# Patient Record
Sex: FEMALE | Race: WHITE | NOT HISPANIC OR LATINO | URBAN - METROPOLITAN AREA
[De-identification: names, ages, dates, MRNs, and addresses within clinical notes are randomized per-mention and may not be internally consistent; named-entity substitution may affect disease eponyms.]

---

## 2018-01-01 ENCOUNTER — INPATIENT (INPATIENT)
Facility: HOSPITAL | Age: 83
LOS: 15 days | DRG: 193 | End: 2018-03-11
Attending: INTERNAL MEDICINE | Admitting: INTERNAL MEDICINE
Payer: MEDICARE

## 2018-01-01 VITALS
RESPIRATION RATE: 24 BRPM | OXYGEN SATURATION: 98 % | DIASTOLIC BLOOD PRESSURE: 74 MMHG | HEART RATE: 142 BPM | SYSTOLIC BLOOD PRESSURE: 137 MMHG

## 2018-01-01 VITALS
SYSTOLIC BLOOD PRESSURE: 150 MMHG | OXYGEN SATURATION: 93 % | TEMPERATURE: 99 F | DIASTOLIC BLOOD PRESSURE: 80 MMHG | RESPIRATION RATE: 24 BRPM | HEART RATE: 123 BPM

## 2018-01-01 DIAGNOSIS — J96.21 ACUTE AND CHRONIC RESPIRATORY FAILURE WITH HYPOXIA: ICD-10-CM

## 2018-01-01 DIAGNOSIS — E44.0 MODERATE PROTEIN-CALORIE MALNUTRITION: ICD-10-CM

## 2018-01-01 DIAGNOSIS — R53.81 OTHER MALAISE: ICD-10-CM

## 2018-01-01 DIAGNOSIS — Z78.9 OTHER SPECIFIED HEALTH STATUS: ICD-10-CM

## 2018-01-01 DIAGNOSIS — J44.9 CHRONIC OBSTRUCTIVE PULMONARY DISEASE, UNSPECIFIED: ICD-10-CM

## 2018-01-01 DIAGNOSIS — G93.40 ENCEPHALOPATHY, UNSPECIFIED: ICD-10-CM

## 2018-01-01 DIAGNOSIS — R06.89 OTHER ABNORMALITIES OF BREATHING: ICD-10-CM

## 2018-01-01 DIAGNOSIS — Z71.89 OTHER SPECIFIED COUNSELING: ICD-10-CM

## 2018-01-01 DIAGNOSIS — J18.1 LOBAR PNEUMONIA, UNSPECIFIED ORGANISM: ICD-10-CM

## 2018-01-01 DIAGNOSIS — J10.1 INFLUENZA DUE TO OTHER IDENTIFIED INFLUENZA VIRUS WITH OTHER RESPIRATORY MANIFESTATIONS: ICD-10-CM

## 2018-01-01 DIAGNOSIS — H25.093 OTHER AGE-RELATED INCIPIENT CATARACT, BILATERAL: Chronic | ICD-10-CM

## 2018-01-01 DIAGNOSIS — Z98.890 OTHER SPECIFIED POSTPROCEDURAL STATES: Chronic | ICD-10-CM

## 2018-01-01 DIAGNOSIS — Z51.5 ENCOUNTER FOR PALLIATIVE CARE: ICD-10-CM

## 2018-01-01 DIAGNOSIS — E86.0 DEHYDRATION: ICD-10-CM

## 2018-01-01 DIAGNOSIS — I48.92 UNSPECIFIED ATRIAL FLUTTER: ICD-10-CM

## 2018-01-01 LAB
ALBUMIN SERPL ELPH-MCNC: 3.5 G/DL — SIGNIFICANT CHANGE UP (ref 3.3–5)
ALBUMIN SERPL ELPH-MCNC: 3.6 G/DL — SIGNIFICANT CHANGE UP (ref 3.3–5)
ALP SERPL-CCNC: 108 U/L — SIGNIFICANT CHANGE UP (ref 40–120)
ALP SERPL-CCNC: 66 U/L — SIGNIFICANT CHANGE UP (ref 40–120)
ALT FLD-CCNC: 34 U/L RC — SIGNIFICANT CHANGE UP (ref 10–45)
ALT FLD-CCNC: 63 U/L RC — HIGH (ref 10–45)
ANION GAP SERPL CALC-SCNC: 10 MMOL/L — SIGNIFICANT CHANGE UP (ref 5–17)
ANION GAP SERPL CALC-SCNC: 10 MMOL/L — SIGNIFICANT CHANGE UP (ref 5–17)
ANION GAP SERPL CALC-SCNC: 11 MMOL/L — SIGNIFICANT CHANGE UP (ref 5–17)
ANION GAP SERPL CALC-SCNC: 11 MMOL/L — SIGNIFICANT CHANGE UP (ref 5–17)
ANION GAP SERPL CALC-SCNC: 16 MMOL/L — SIGNIFICANT CHANGE UP (ref 5–17)
ANION GAP SERPL CALC-SCNC: 18 MMOL/L — HIGH (ref 5–17)
ANION GAP SERPL CALC-SCNC: 6 MMOL/L — SIGNIFICANT CHANGE UP (ref 5–17)
ANION GAP SERPL CALC-SCNC: 7 MMOL/L — SIGNIFICANT CHANGE UP (ref 5–17)
ANION GAP SERPL CALC-SCNC: 8 MMOL/L — SIGNIFICANT CHANGE UP (ref 5–17)
ANION GAP SERPL CALC-SCNC: 9 MMOL/L — SIGNIFICANT CHANGE UP (ref 5–17)
ANION GAP SERPL CALC-SCNC: 9 MMOL/L — SIGNIFICANT CHANGE UP (ref 5–17)
APPEARANCE UR: CLEAR — SIGNIFICANT CHANGE UP
APTT BLD: 27.2 SEC — LOW (ref 27.5–37.4)
AST SERPL-CCNC: 16 U/L — SIGNIFICANT CHANGE UP (ref 10–40)
AST SERPL-CCNC: 50 U/L — HIGH (ref 10–40)
BASE EXCESS BLDA CALC-SCNC: -0.7 MMOL/L — SIGNIFICANT CHANGE UP (ref -2–2)
BASE EXCESS BLDA CALC-SCNC: -1.6 MMOL/L — SIGNIFICANT CHANGE UP (ref -2–2)
BASE EXCESS BLDA CALC-SCNC: 0.9 MMOL/L — SIGNIFICANT CHANGE UP (ref -2–2)
BASE EXCESS BLDA CALC-SCNC: 10 MMOL/L — HIGH (ref -2–2)
BASE EXCESS BLDA CALC-SCNC: 10.4 MMOL/L — HIGH (ref -2–2)
BASE EXCESS BLDA CALC-SCNC: 13 MMOL/L — HIGH (ref -2–2)
BASE EXCESS BLDA CALC-SCNC: 13.3 MMOL/L — HIGH (ref -2–2)
BASE EXCESS BLDA CALC-SCNC: 14.2 MMOL/L — HIGH (ref -2–2)
BASE EXCESS BLDA CALC-SCNC: 15.5 MMOL/L — HIGH (ref -2–2)
BASE EXCESS BLDA CALC-SCNC: 17.2 MMOL/L — HIGH (ref -2–2)
BASE EXCESS BLDA CALC-SCNC: 2.9 MMOL/L — HIGH (ref -2–2)
BASE EXCESS BLDA CALC-SCNC: 5.9 MMOL/L — HIGH (ref -2–2)
BASE EXCESS BLDA CALC-SCNC: 8 MMOL/L — HIGH (ref -2–2)
BASE EXCESS BLDV CALC-SCNC: 1 MMOL/L — SIGNIFICANT CHANGE UP (ref -2–2)
BASOPHILS # BLD AUTO: 0 K/UL — SIGNIFICANT CHANGE UP (ref 0–0.2)
BASOPHILS # BLD AUTO: 0 K/UL — SIGNIFICANT CHANGE UP (ref 0–0.2)
BASOPHILS NFR BLD AUTO: 0.1 % — SIGNIFICANT CHANGE UP (ref 0–2)
BASOPHILS NFR BLD AUTO: 0.3 % — SIGNIFICANT CHANGE UP (ref 0–2)
BILIRUB SERPL-MCNC: 0.3 MG/DL — SIGNIFICANT CHANGE UP (ref 0.2–1.2)
BILIRUB SERPL-MCNC: 0.3 MG/DL — SIGNIFICANT CHANGE UP (ref 0.2–1.2)
BILIRUB UR-MCNC: NEGATIVE — SIGNIFICANT CHANGE UP
BUN SERPL-MCNC: 34 MG/DL — HIGH (ref 7–23)
BUN SERPL-MCNC: 35 MG/DL — HIGH (ref 7–23)
BUN SERPL-MCNC: 38 MG/DL — HIGH (ref 7–23)
BUN SERPL-MCNC: 38 MG/DL — HIGH (ref 7–23)
BUN SERPL-MCNC: 40 MG/DL — HIGH (ref 7–23)
BUN SERPL-MCNC: 48 MG/DL — HIGH (ref 7–23)
BUN SERPL-MCNC: 50 MG/DL — HIGH (ref 7–23)
BUN SERPL-MCNC: 52 MG/DL — HIGH (ref 7–23)
BUN SERPL-MCNC: 61 MG/DL — HIGH (ref 7–23)
BUN SERPL-MCNC: 65 MG/DL — HIGH (ref 7–23)
BUN SERPL-MCNC: 75 MG/DL — HIGH (ref 7–23)
BUN SERPL-MCNC: 80 MG/DL — HIGH (ref 7–23)
BUN SERPL-MCNC: 93 MG/DL — HIGH (ref 7–23)
CA-I SERPL-SCNC: 1.25 MMOL/L — SIGNIFICANT CHANGE UP (ref 1.12–1.3)
CALCIUM SERPL-MCNC: 10 MG/DL — SIGNIFICANT CHANGE UP (ref 8.4–10.5)
CALCIUM SERPL-MCNC: 10 MG/DL — SIGNIFICANT CHANGE UP (ref 8.4–10.5)
CALCIUM SERPL-MCNC: 10.1 MG/DL — SIGNIFICANT CHANGE UP (ref 8.4–10.5)
CALCIUM SERPL-MCNC: 8.9 MG/DL — SIGNIFICANT CHANGE UP (ref 8.4–10.5)
CALCIUM SERPL-MCNC: 9.3 MG/DL — SIGNIFICANT CHANGE UP (ref 8.4–10.5)
CALCIUM SERPL-MCNC: 9.3 MG/DL — SIGNIFICANT CHANGE UP (ref 8.4–10.5)
CALCIUM SERPL-MCNC: 9.4 MG/DL — SIGNIFICANT CHANGE UP (ref 8.4–10.5)
CALCIUM SERPL-MCNC: 9.6 MG/DL — SIGNIFICANT CHANGE UP (ref 8.4–10.5)
CALCIUM SERPL-MCNC: 9.6 MG/DL — SIGNIFICANT CHANGE UP (ref 8.4–10.5)
CALCIUM SERPL-MCNC: 9.8 MG/DL — SIGNIFICANT CHANGE UP (ref 8.4–10.5)
CALCIUM SERPL-MCNC: 9.8 MG/DL — SIGNIFICANT CHANGE UP (ref 8.4–10.5)
CALCIUM SERPL-MCNC: 9.9 MG/DL — SIGNIFICANT CHANGE UP (ref 8.4–10.5)
CALCIUM SERPL-MCNC: 9.9 MG/DL — SIGNIFICANT CHANGE UP (ref 8.4–10.5)
CHLORIDE BLDV-SCNC: 98 MMOL/L — SIGNIFICANT CHANGE UP (ref 96–108)
CHLORIDE SERPL-SCNC: 102 MMOL/L — SIGNIFICANT CHANGE UP (ref 96–108)
CHLORIDE SERPL-SCNC: 103 MMOL/L — SIGNIFICANT CHANGE UP (ref 96–108)
CHLORIDE SERPL-SCNC: 89 MMOL/L — LOW (ref 96–108)
CHLORIDE SERPL-SCNC: 91 MMOL/L — LOW (ref 96–108)
CHLORIDE SERPL-SCNC: 91 MMOL/L — LOW (ref 96–108)
CHLORIDE SERPL-SCNC: 93 MMOL/L — LOW (ref 96–108)
CHLORIDE SERPL-SCNC: 93 MMOL/L — LOW (ref 96–108)
CHLORIDE SERPL-SCNC: 94 MMOL/L — LOW (ref 96–108)
CHLORIDE SERPL-SCNC: 96 MMOL/L — SIGNIFICANT CHANGE UP (ref 96–108)
CHLORIDE SERPL-SCNC: 98 MMOL/L — SIGNIFICANT CHANGE UP (ref 96–108)
CHLORIDE SERPL-SCNC: 99 MMOL/L — SIGNIFICANT CHANGE UP (ref 96–108)
CK MB BLD-MCNC: 10.4 % — HIGH (ref 0–3.5)
CK MB BLD-MCNC: 11 % — HIGH (ref 0–3.5)
CK MB BLD-MCNC: 11.9 % — HIGH (ref 0–3.5)
CK MB CFR SERPL CALC: 11.1 NG/ML — HIGH (ref 0–3.8)
CK MB CFR SERPL CALC: 12.1 NG/ML — HIGH (ref 0–3.8)
CK MB CFR SERPL CALC: 12.9 NG/ML — HIGH (ref 0–3.8)
CK MB CFR SERPL CALC: 4 NG/ML — HIGH (ref 0–3.8)
CK SERPL-CCNC: 101 U/L — SIGNIFICANT CHANGE UP (ref 25–170)
CK SERPL-CCNC: 108 U/L — SIGNIFICANT CHANGE UP (ref 25–170)
CK SERPL-CCNC: 116 U/L — SIGNIFICANT CHANGE UP (ref 25–170)
CK SERPL-CCNC: 49 U/L — SIGNIFICANT CHANGE UP (ref 25–170)
CO2 BLDA-SCNC: 28 MMOL/L — SIGNIFICANT CHANGE UP (ref 22–30)
CO2 BLDA-SCNC: 28 MMOL/L — SIGNIFICANT CHANGE UP (ref 22–30)
CO2 BLDA-SCNC: 32 MMOL/L — HIGH (ref 22–30)
CO2 BLDA-SCNC: 34 MMOL/L — HIGH (ref 22–30)
CO2 BLDA-SCNC: 34 MMOL/L — HIGH (ref 22–30)
CO2 BLDA-SCNC: 36 MMOL/L — HIGH (ref 22–30)
CO2 BLDA-SCNC: 41 MMOL/L — HIGH (ref 22–30)
CO2 BLDA-SCNC: 42 MMOL/L — HIGH (ref 22–30)
CO2 BLDA-SCNC: 42 MMOL/L — HIGH (ref 22–30)
CO2 BLDA-SCNC: 43 MMOL/L — HIGH (ref 22–30)
CO2 BLDA-SCNC: 43 MMOL/L — HIGH (ref 22–30)
CO2 BLDA-SCNC: 44 MMOL/L — HIGH (ref 22–30)
CO2 BLDA-SCNC: 48 MMOL/L — HIGH (ref 22–30)
CO2 BLDV-SCNC: 31 MMOL/L — HIGH (ref 22–30)
CO2 SERPL-SCNC: 22 MMOL/L — SIGNIFICANT CHANGE UP (ref 22–31)
CO2 SERPL-SCNC: 24 MMOL/L — SIGNIFICANT CHANGE UP (ref 22–31)
CO2 SERPL-SCNC: 30 MMOL/L — SIGNIFICANT CHANGE UP (ref 22–31)
CO2 SERPL-SCNC: 30 MMOL/L — SIGNIFICANT CHANGE UP (ref 22–31)
CO2 SERPL-SCNC: 33 MMOL/L — HIGH (ref 22–31)
CO2 SERPL-SCNC: 34 MMOL/L — HIGH (ref 22–31)
CO2 SERPL-SCNC: 35 MMOL/L — HIGH (ref 22–31)
CO2 SERPL-SCNC: 35 MMOL/L — HIGH (ref 22–31)
CO2 SERPL-SCNC: 38 MMOL/L — HIGH (ref 22–31)
CO2 SERPL-SCNC: 38 MMOL/L — HIGH (ref 22–31)
CO2 SERPL-SCNC: 39 MMOL/L — HIGH (ref 22–31)
CO2 SERPL-SCNC: 39 MMOL/L — HIGH (ref 22–31)
CO2 SERPL-SCNC: 43 MMOL/L — HIGH (ref 22–31)
COLOR SPEC: YELLOW — SIGNIFICANT CHANGE UP
CREAT SERPL-MCNC: 0.74 MG/DL — SIGNIFICANT CHANGE UP (ref 0.5–1.3)
CREAT SERPL-MCNC: 0.76 MG/DL — SIGNIFICANT CHANGE UP (ref 0.5–1.3)
CREAT SERPL-MCNC: 0.85 MG/DL — SIGNIFICANT CHANGE UP (ref 0.5–1.3)
CREAT SERPL-MCNC: 0.88 MG/DL — SIGNIFICANT CHANGE UP (ref 0.5–1.3)
CREAT SERPL-MCNC: 0.9 MG/DL — SIGNIFICANT CHANGE UP (ref 0.5–1.3)
CREAT SERPL-MCNC: 0.93 MG/DL — SIGNIFICANT CHANGE UP (ref 0.5–1.3)
CREAT SERPL-MCNC: 0.93 MG/DL — SIGNIFICANT CHANGE UP (ref 0.5–1.3)
CREAT SERPL-MCNC: 1.13 MG/DL — SIGNIFICANT CHANGE UP (ref 0.5–1.3)
CREAT SERPL-MCNC: 1.13 MG/DL — SIGNIFICANT CHANGE UP (ref 0.5–1.3)
CREAT SERPL-MCNC: 1.26 MG/DL — SIGNIFICANT CHANGE UP (ref 0.5–1.3)
CREAT SERPL-MCNC: 1.3 MG/DL — SIGNIFICANT CHANGE UP (ref 0.5–1.3)
CREAT SERPL-MCNC: 1.37 MG/DL — HIGH (ref 0.5–1.3)
CREAT SERPL-MCNC: 1.45 MG/DL — HIGH (ref 0.5–1.3)
CULTURE RESULTS: NO GROWTH — SIGNIFICANT CHANGE UP
CULTURE RESULTS: SIGNIFICANT CHANGE UP
DIFF PNL FLD: NEGATIVE — SIGNIFICANT CHANGE UP
DIGOXIN SERPL-MCNC: 1.5 NG/ML — SIGNIFICANT CHANGE UP (ref 0.8–2)
EOSINOPHIL # BLD AUTO: 0 K/UL — SIGNIFICANT CHANGE UP (ref 0–0.5)
EOSINOPHIL # BLD AUTO: 0 K/UL — SIGNIFICANT CHANGE UP (ref 0–0.5)
EOSINOPHIL NFR BLD AUTO: 0 % — SIGNIFICANT CHANGE UP (ref 0–6)
EOSINOPHIL NFR BLD AUTO: 0.2 % — SIGNIFICANT CHANGE UP (ref 0–6)
FLUAV H1 2009 PAND RNA SPEC QL NAA+PROBE: DETECTED
GAS PNL BLDA: SIGNIFICANT CHANGE UP
GAS PNL BLDV: 137 MMOL/L — SIGNIFICANT CHANGE UP (ref 136–145)
GAS PNL BLDV: SIGNIFICANT CHANGE UP
GLUCOSE BLDC GLUCOMTR-MCNC: 288 MG/DL — HIGH (ref 70–99)
GLUCOSE BLDV-MCNC: 123 MG/DL — HIGH (ref 70–99)
GLUCOSE SERPL-MCNC: 114 MG/DL — HIGH (ref 70–99)
GLUCOSE SERPL-MCNC: 125 MG/DL — HIGH (ref 70–99)
GLUCOSE SERPL-MCNC: 146 MG/DL — HIGH (ref 70–99)
GLUCOSE SERPL-MCNC: 179 MG/DL — HIGH (ref 70–99)
GLUCOSE SERPL-MCNC: 201 MG/DL — HIGH (ref 70–99)
GLUCOSE SERPL-MCNC: 201 MG/DL — HIGH (ref 70–99)
GLUCOSE SERPL-MCNC: 202 MG/DL — HIGH (ref 70–99)
GLUCOSE SERPL-MCNC: 206 MG/DL — HIGH (ref 70–99)
GLUCOSE SERPL-MCNC: 211 MG/DL — HIGH (ref 70–99)
GLUCOSE SERPL-MCNC: 253 MG/DL — HIGH (ref 70–99)
GLUCOSE SERPL-MCNC: 259 MG/DL — HIGH (ref 70–99)
GLUCOSE SERPL-MCNC: 284 MG/DL — HIGH (ref 70–99)
GLUCOSE SERPL-MCNC: 87 MG/DL — SIGNIFICANT CHANGE UP (ref 70–99)
GLUCOSE UR QL: NEGATIVE — SIGNIFICANT CHANGE UP
HCO3 BLDA-SCNC: 26 MMOL/L — SIGNIFICANT CHANGE UP (ref 21–29)
HCO3 BLDA-SCNC: 26 MMOL/L — SIGNIFICANT CHANGE UP (ref 21–29)
HCO3 BLDA-SCNC: 30 MMOL/L — HIGH (ref 21–29)
HCO3 BLDA-SCNC: 31 MMOL/L — HIGH (ref 21–29)
HCO3 BLDA-SCNC: 32 MMOL/L — HIGH (ref 21–29)
HCO3 BLDA-SCNC: 35 MMOL/L — HIGH (ref 21–29)
HCO3 BLDA-SCNC: 39 MMOL/L — HIGH (ref 21–29)
HCO3 BLDA-SCNC: 39 MMOL/L — HIGH (ref 21–29)
HCO3 BLDA-SCNC: 40 MMOL/L — HIGH (ref 21–29)
HCO3 BLDA-SCNC: 40 MMOL/L — HIGH (ref 21–29)
HCO3 BLDA-SCNC: 41 MMOL/L — HIGH (ref 21–29)
HCO3 BLDA-SCNC: 42 MMOL/L — HIGH (ref 21–29)
HCO3 BLDA-SCNC: 45 MMOL/L — HIGH (ref 21–29)
HCO3 BLDV-SCNC: 29 MMOL/L — SIGNIFICANT CHANGE UP (ref 21–29)
HCT VFR BLD CALC: 31 % — LOW (ref 34.5–45)
HCT VFR BLD CALC: 32.6 % — LOW (ref 34.5–45)
HCT VFR BLD CALC: 32.8 % — LOW (ref 34.5–45)
HCT VFR BLD CALC: 33.1 % — LOW (ref 34.5–45)
HCT VFR BLD CALC: 33.8 % — LOW (ref 34.5–45)
HCT VFR BLD CALC: 34.4 % — LOW (ref 34.5–45)
HCT VFR BLD CALC: 34.6 % — SIGNIFICANT CHANGE UP (ref 34.5–45)
HCT VFR BLD CALC: 34.9 % — SIGNIFICANT CHANGE UP (ref 34.5–45)
HCT VFR BLD CALC: 35.2 % — SIGNIFICANT CHANGE UP (ref 34.5–45)
HCT VFR BLD CALC: 35.7 % — SIGNIFICANT CHANGE UP (ref 34.5–45)
HCT VFR BLD CALC: 37.8 % — SIGNIFICANT CHANGE UP (ref 34.5–45)
HCT VFR BLDA CALC: 37 % — LOW (ref 39–50)
HGB BLD CALC-MCNC: 12.1 G/DL — SIGNIFICANT CHANGE UP (ref 11.5–15.5)
HGB BLD-MCNC: 10.3 G/DL — LOW (ref 11.5–15.5)
HGB BLD-MCNC: 10.4 G/DL — LOW (ref 11.5–15.5)
HGB BLD-MCNC: 10.5 G/DL — LOW (ref 11.5–15.5)
HGB BLD-MCNC: 10.6 G/DL — LOW (ref 11.5–15.5)
HGB BLD-MCNC: 10.6 G/DL — LOW (ref 11.5–15.5)
HGB BLD-MCNC: 10.7 G/DL — LOW (ref 11.5–15.5)
HGB BLD-MCNC: 11 G/DL — LOW (ref 11.5–15.5)
HGB BLD-MCNC: 11.1 G/DL — LOW (ref 11.5–15.5)
HGB BLD-MCNC: 11.4 G/DL — LOW (ref 11.5–15.5)
HGB BLD-MCNC: 12.4 G/DL — SIGNIFICANT CHANGE UP (ref 11.5–15.5)
HGB BLD-MCNC: 9.7 G/DL — LOW (ref 11.5–15.5)
HOROWITZ INDEX BLDA+IHG-RTO: 32 — SIGNIFICANT CHANGE UP
HOROWITZ INDEX BLDA+IHG-RTO: 40 — SIGNIFICANT CHANGE UP
HOROWITZ INDEX BLDA+IHG-RTO: 50 — SIGNIFICANT CHANGE UP
HOROWITZ INDEX BLDA+IHG-RTO: 50 — SIGNIFICANT CHANGE UP
HOROWITZ INDEX BLDA+IHG-RTO: SIGNIFICANT CHANGE UP
HYALINE CASTS # UR AUTO: ABNORMAL
INR BLD: 0.9 RATIO — SIGNIFICANT CHANGE UP (ref 0.88–1.16)
KETONES UR-MCNC: NEGATIVE — SIGNIFICANT CHANGE UP
LACTATE BLDV-MCNC: 2.1 MMOL/L — HIGH (ref 0.7–2)
LEGIONELLA AG UR QL: NEGATIVE — SIGNIFICANT CHANGE UP
LEUKOCYTE ESTERASE UR-ACNC: NEGATIVE — SIGNIFICANT CHANGE UP
LYMPHOCYTES # BLD AUTO: 0.4 K/UL — LOW (ref 1–3.3)
LYMPHOCYTES # BLD AUTO: 0.9 K/UL — LOW (ref 1–3.3)
LYMPHOCYTES # BLD AUTO: 12.7 % — LOW (ref 13–44)
LYMPHOCYTES # BLD AUTO: 2.6 % — LOW (ref 13–44)
MAGNESIUM SERPL-MCNC: 1.9 MG/DL — SIGNIFICANT CHANGE UP (ref 1.6–2.6)
MAGNESIUM SERPL-MCNC: 1.9 MG/DL — SIGNIFICANT CHANGE UP (ref 1.6–2.6)
MAGNESIUM SERPL-MCNC: 2.2 MG/DL — SIGNIFICANT CHANGE UP (ref 1.6–2.6)
MAGNESIUM SERPL-MCNC: 2.4 MG/DL — SIGNIFICANT CHANGE UP (ref 1.6–2.6)
MAGNESIUM SERPL-MCNC: 2.5 MG/DL — SIGNIFICANT CHANGE UP (ref 1.6–2.6)
MCHC RBC-ENTMCNC: 30.2 PG — SIGNIFICANT CHANGE UP (ref 27–34)
MCHC RBC-ENTMCNC: 30.2 PG — SIGNIFICANT CHANGE UP (ref 27–34)
MCHC RBC-ENTMCNC: 30.4 GM/DL — LOW (ref 32–36)
MCHC RBC-ENTMCNC: 30.5 GM/DL — LOW (ref 32–36)
MCHC RBC-ENTMCNC: 30.7 PG — SIGNIFICANT CHANGE UP (ref 27–34)
MCHC RBC-ENTMCNC: 30.8 PG — SIGNIFICANT CHANGE UP (ref 27–34)
MCHC RBC-ENTMCNC: 30.8 PG — SIGNIFICANT CHANGE UP (ref 27–34)
MCHC RBC-ENTMCNC: 31 GM/DL — LOW (ref 32–36)
MCHC RBC-ENTMCNC: 31.1 GM/DL — LOW (ref 32–36)
MCHC RBC-ENTMCNC: 31.2 PG — SIGNIFICANT CHANGE UP (ref 27–34)
MCHC RBC-ENTMCNC: 31.3 GM/DL — LOW (ref 32–36)
MCHC RBC-ENTMCNC: 31.3 PG — SIGNIFICANT CHANGE UP (ref 27–34)
MCHC RBC-ENTMCNC: 31.5 GM/DL — LOW (ref 32–36)
MCHC RBC-ENTMCNC: 31.7 GM/DL — LOW (ref 32–36)
MCHC RBC-ENTMCNC: 31.8 PG — SIGNIFICANT CHANGE UP (ref 27–34)
MCHC RBC-ENTMCNC: 31.8 PG — SIGNIFICANT CHANGE UP (ref 27–34)
MCHC RBC-ENTMCNC: 32.1 PG — SIGNIFICANT CHANGE UP (ref 27–34)
MCHC RBC-ENTMCNC: 32.2 GM/DL — SIGNIFICANT CHANGE UP (ref 32–36)
MCHC RBC-ENTMCNC: 32.3 GM/DL — SIGNIFICANT CHANGE UP (ref 32–36)
MCHC RBC-ENTMCNC: 32.3 PG — SIGNIFICANT CHANGE UP (ref 27–34)
MCHC RBC-ENTMCNC: 32.7 GM/DL — SIGNIFICANT CHANGE UP (ref 32–36)
MCHC RBC-ENTMCNC: 32.9 GM/DL — SIGNIFICANT CHANGE UP (ref 32–36)
MCV RBC AUTO: 97.6 FL — SIGNIFICANT CHANGE UP (ref 80–100)
MCV RBC AUTO: 98.3 FL — SIGNIFICANT CHANGE UP (ref 80–100)
MCV RBC AUTO: 98.5 FL — SIGNIFICANT CHANGE UP (ref 80–100)
MCV RBC AUTO: 98.6 FL — SIGNIFICANT CHANGE UP (ref 80–100)
MCV RBC AUTO: 98.7 FL — SIGNIFICANT CHANGE UP (ref 80–100)
MCV RBC AUTO: 98.9 FL — SIGNIFICANT CHANGE UP (ref 80–100)
MCV RBC AUTO: 99 FL — SIGNIFICANT CHANGE UP (ref 80–100)
MCV RBC AUTO: 99.1 FL — SIGNIFICANT CHANGE UP (ref 80–100)
MCV RBC AUTO: 99.1 FL — SIGNIFICANT CHANGE UP (ref 80–100)
MCV RBC AUTO: 99.3 FL — SIGNIFICANT CHANGE UP (ref 80–100)
MCV RBC AUTO: 99.4 FL — SIGNIFICANT CHANGE UP (ref 80–100)
MONOCYTES # BLD AUTO: 0.4 K/UL — SIGNIFICANT CHANGE UP (ref 0–0.9)
MONOCYTES # BLD AUTO: 1 K/UL — HIGH (ref 0–0.9)
MONOCYTES NFR BLD AUTO: 14.2 % — HIGH (ref 2–14)
MONOCYTES NFR BLD AUTO: 2.4 % — SIGNIFICANT CHANGE UP (ref 2–14)
NEUTROPHILS # BLD AUTO: 16.3 K/UL — HIGH (ref 1.8–7.4)
NEUTROPHILS # BLD AUTO: 5.3 K/UL — SIGNIFICANT CHANGE UP (ref 1.8–7.4)
NEUTROPHILS NFR BLD AUTO: 72.6 % — SIGNIFICANT CHANGE UP (ref 43–77)
NEUTROPHILS NFR BLD AUTO: 95 % — HIGH (ref 43–77)
NITRITE UR-MCNC: NEGATIVE — SIGNIFICANT CHANGE UP
NT-PROBNP SERPL-SCNC: 5355 PG/ML — HIGH (ref 0–300)
NT-PROBNP SERPL-SCNC: 5807 PG/ML — HIGH (ref 0–300)
PCO2 BLDA: 58 MMHG — HIGH (ref 32–46)
PCO2 BLDA: 59 MMHG — HIGH (ref 32–46)
PCO2 BLDA: 60 MMHG — HIGH (ref 32–46)
PCO2 BLDA: 63 MMHG — HIGH (ref 32–46)
PCO2 BLDA: 67 MMHG — HIGH (ref 32–46)
PCO2 BLDA: 68 MMHG — HIGH (ref 32–46)
PCO2 BLDA: 70 MMHG — CRITICAL HIGH (ref 32–46)
PCO2 BLDA: 73 MMHG — CRITICAL HIGH (ref 32–46)
PCO2 BLDA: 75 MMHG — CRITICAL HIGH (ref 32–46)
PCO2 BLDA: 77 MMHG — CRITICAL HIGH (ref 32–46)
PCO2 BLDA: 82 MMHG — CRITICAL HIGH (ref 32–46)
PCO2 BLDA: 84 MMHG — CRITICAL HIGH (ref 32–46)
PCO2 BLDA: 85 MMHG — CRITICAL HIGH (ref 32–46)
PCO2 BLDV: 65 MMHG — HIGH (ref 35–50)
PH BLDA: 7.21 — LOW (ref 7.35–7.45)
PH BLDA: 7.25 — LOW (ref 7.35–7.45)
PH BLDA: 7.26 — LOW (ref 7.35–7.45)
PH BLDA: 7.27 — LOW (ref 7.35–7.45)
PH BLDA: 7.28 — LOW (ref 7.35–7.45)
PH BLDA: 7.29 — LOW (ref 7.35–7.45)
PH BLDA: 7.35 — SIGNIFICANT CHANGE UP (ref 7.35–7.45)
PH BLDA: 7.36 — SIGNIFICANT CHANGE UP (ref 7.35–7.45)
PH BLDA: 7.39 — SIGNIFICANT CHANGE UP (ref 7.35–7.45)
PH BLDA: 7.39 — SIGNIFICANT CHANGE UP (ref 7.35–7.45)
PH BLDA: 7.42 — SIGNIFICANT CHANGE UP (ref 7.35–7.45)
PH BLDV: 7.27 — LOW (ref 7.35–7.45)
PH UR: 5.5 — SIGNIFICANT CHANGE UP (ref 5–8)
PHOSPHATE SERPL-MCNC: 4.9 MG/DL — HIGH (ref 2.5–4.5)
PLAT MORPH BLD: NORMAL — SIGNIFICANT CHANGE UP
PLATELET # BLD AUTO: 176 K/UL — SIGNIFICANT CHANGE UP (ref 150–400)
PLATELET # BLD AUTO: 204 K/UL — SIGNIFICANT CHANGE UP (ref 150–400)
PLATELET # BLD AUTO: 205 K/UL — SIGNIFICANT CHANGE UP (ref 150–400)
PLATELET # BLD AUTO: 208 K/UL — SIGNIFICANT CHANGE UP (ref 150–400)
PLATELET # BLD AUTO: 215 K/UL — SIGNIFICANT CHANGE UP (ref 150–400)
PLATELET # BLD AUTO: 250 K/UL — SIGNIFICANT CHANGE UP (ref 150–400)
PLATELET # BLD AUTO: 274 K/UL — SIGNIFICANT CHANGE UP (ref 150–400)
PLATELET # BLD AUTO: 293 K/UL — SIGNIFICANT CHANGE UP (ref 150–400)
PLATELET # BLD AUTO: 305 K/UL — SIGNIFICANT CHANGE UP (ref 150–400)
PLATELET # BLD AUTO: 305 K/UL — SIGNIFICANT CHANGE UP (ref 150–400)
PLATELET # BLD AUTO: 91 K/UL — LOW (ref 150–400)
PO2 BLDA: 109 MMHG — HIGH (ref 74–108)
PO2 BLDA: 120 MMHG — HIGH (ref 74–108)
PO2 BLDA: 123 MMHG — HIGH (ref 74–108)
PO2 BLDA: 146 MMHG — HIGH (ref 74–108)
PO2 BLDA: 167 MMHG — HIGH (ref 74–108)
PO2 BLDA: 224 MMHG — HIGH (ref 74–108)
PO2 BLDA: 235 MMHG — HIGH (ref 74–108)
PO2 BLDA: 74 MMHG — SIGNIFICANT CHANGE UP (ref 74–108)
PO2 BLDA: 79 MMHG — SIGNIFICANT CHANGE UP (ref 74–108)
PO2 BLDA: 84 MMHG — SIGNIFICANT CHANGE UP (ref 74–108)
PO2 BLDA: 94 MMHG — SIGNIFICANT CHANGE UP (ref 74–108)
PO2 BLDA: 98 MMHG — SIGNIFICANT CHANGE UP (ref 74–108)
PO2 BLDA: 98 MMHG — SIGNIFICANT CHANGE UP (ref 74–108)
PO2 BLDV: 40 MMHG — SIGNIFICANT CHANGE UP (ref 25–45)
POTASSIUM BLDV-SCNC: 4.2 MMOL/L — SIGNIFICANT CHANGE UP (ref 3.5–5)
POTASSIUM SERPL-MCNC: 3.8 MMOL/L — SIGNIFICANT CHANGE UP (ref 3.5–5.3)
POTASSIUM SERPL-MCNC: 3.8 MMOL/L — SIGNIFICANT CHANGE UP (ref 3.5–5.3)
POTASSIUM SERPL-MCNC: 4 MMOL/L — SIGNIFICANT CHANGE UP (ref 3.5–5.3)
POTASSIUM SERPL-MCNC: 4.4 MMOL/L — SIGNIFICANT CHANGE UP (ref 3.5–5.3)
POTASSIUM SERPL-MCNC: 4.5 MMOL/L — SIGNIFICANT CHANGE UP (ref 3.5–5.3)
POTASSIUM SERPL-MCNC: 4.6 MMOL/L — SIGNIFICANT CHANGE UP (ref 3.5–5.3)
POTASSIUM SERPL-MCNC: 4.6 MMOL/L — SIGNIFICANT CHANGE UP (ref 3.5–5.3)
POTASSIUM SERPL-MCNC: 4.9 MMOL/L — SIGNIFICANT CHANGE UP (ref 3.5–5.3)
POTASSIUM SERPL-MCNC: 4.9 MMOL/L — SIGNIFICANT CHANGE UP (ref 3.5–5.3)
POTASSIUM SERPL-MCNC: 5.1 MMOL/L — SIGNIFICANT CHANGE UP (ref 3.5–5.3)
POTASSIUM SERPL-SCNC: 3.8 MMOL/L — SIGNIFICANT CHANGE UP (ref 3.5–5.3)
POTASSIUM SERPL-SCNC: 3.8 MMOL/L — SIGNIFICANT CHANGE UP (ref 3.5–5.3)
POTASSIUM SERPL-SCNC: 4 MMOL/L — SIGNIFICANT CHANGE UP (ref 3.5–5.3)
POTASSIUM SERPL-SCNC: 4.4 MMOL/L — SIGNIFICANT CHANGE UP (ref 3.5–5.3)
POTASSIUM SERPL-SCNC: 4.5 MMOL/L — SIGNIFICANT CHANGE UP (ref 3.5–5.3)
POTASSIUM SERPL-SCNC: 4.6 MMOL/L — SIGNIFICANT CHANGE UP (ref 3.5–5.3)
POTASSIUM SERPL-SCNC: 4.6 MMOL/L — SIGNIFICANT CHANGE UP (ref 3.5–5.3)
POTASSIUM SERPL-SCNC: 4.9 MMOL/L — SIGNIFICANT CHANGE UP (ref 3.5–5.3)
POTASSIUM SERPL-SCNC: 4.9 MMOL/L — SIGNIFICANT CHANGE UP (ref 3.5–5.3)
POTASSIUM SERPL-SCNC: 5.1 MMOL/L — SIGNIFICANT CHANGE UP (ref 3.5–5.3)
PROCALCITONIN SERPL-MCNC: 0.76 NG/ML — HIGH (ref 0–0.04)
PROT SERPL-MCNC: 7.3 G/DL — SIGNIFICANT CHANGE UP (ref 6–8.3)
PROT SERPL-MCNC: 8.2 G/DL — SIGNIFICANT CHANGE UP (ref 6–8.3)
PROT UR-MCNC: 30 MG/DL
PROTHROM AB SERPL-ACNC: 9.7 SEC — LOW (ref 9.8–12.7)
RAPID RVP RESULT: DETECTED
RBC # BLD: 3.12 M/UL — LOW (ref 3.8–5.2)
RBC # BLD: 3.3 M/UL — LOW (ref 3.8–5.2)
RBC # BLD: 3.33 M/UL — LOW (ref 3.8–5.2)
RBC # BLD: 3.34 M/UL — LOW (ref 3.8–5.2)
RBC # BLD: 3.44 M/UL — LOW (ref 3.8–5.2)
RBC # BLD: 3.49 M/UL — LOW (ref 3.8–5.2)
RBC # BLD: 3.49 M/UL — LOW (ref 3.8–5.2)
RBC # BLD: 3.53 M/UL — LOW (ref 3.8–5.2)
RBC # BLD: 3.54 M/UL — LOW (ref 3.8–5.2)
RBC # BLD: 3.6 M/UL — LOW (ref 3.8–5.2)
RBC # BLD: 3.88 M/UL — SIGNIFICANT CHANGE UP (ref 3.8–5.2)
RBC # FLD: 12.7 % — SIGNIFICANT CHANGE UP (ref 10.3–14.5)
RBC # FLD: 12.8 % — SIGNIFICANT CHANGE UP (ref 10.3–14.5)
RBC # FLD: 12.9 % — SIGNIFICANT CHANGE UP (ref 10.3–14.5)
RBC # FLD: 13.1 % — SIGNIFICANT CHANGE UP (ref 10.3–14.5)
RBC # FLD: 13.2 % — SIGNIFICANT CHANGE UP (ref 10.3–14.5)
RBC BLD AUTO: SIGNIFICANT CHANGE UP
RBC CASTS # UR COMP ASSIST: SIGNIFICANT CHANGE UP /HPF (ref 0–2)
SAO2 % BLDA: 100 % — HIGH (ref 92–96)
SAO2 % BLDA: 100 % — HIGH (ref 92–96)
SAO2 % BLDA: 94 % — SIGNIFICANT CHANGE UP (ref 92–96)
SAO2 % BLDA: 94 % — SIGNIFICANT CHANGE UP (ref 92–96)
SAO2 % BLDA: 95 % — SIGNIFICANT CHANGE UP (ref 92–96)
SAO2 % BLDA: 96 % — SIGNIFICANT CHANGE UP (ref 92–96)
SAO2 % BLDA: 98 % — HIGH (ref 92–96)
SAO2 % BLDA: 99 % — HIGH (ref 92–96)
SAO2 % BLDV: 62 % — LOW (ref 67–88)
SODIUM SERPL-SCNC: 136 MMOL/L — SIGNIFICANT CHANGE UP (ref 135–145)
SODIUM SERPL-SCNC: 138 MMOL/L — SIGNIFICANT CHANGE UP (ref 135–145)
SODIUM SERPL-SCNC: 139 MMOL/L — SIGNIFICANT CHANGE UP (ref 135–145)
SODIUM SERPL-SCNC: 140 MMOL/L — SIGNIFICANT CHANGE UP (ref 135–145)
SODIUM SERPL-SCNC: 142 MMOL/L — SIGNIFICANT CHANGE UP (ref 135–145)
SODIUM SERPL-SCNC: 143 MMOL/L — SIGNIFICANT CHANGE UP (ref 135–145)
SODIUM SERPL-SCNC: 143 MMOL/L — SIGNIFICANT CHANGE UP (ref 135–145)
SODIUM SERPL-SCNC: 146 MMOL/L — HIGH (ref 135–145)
SP GR SPEC: 1.02 — SIGNIFICANT CHANGE UP (ref 1.01–1.02)
SPECIMEN SOURCE: SIGNIFICANT CHANGE UP
TROPONIN T SERPL-MCNC: 0.01 NG/ML — SIGNIFICANT CHANGE UP (ref 0–0.06)
TROPONIN T SERPL-MCNC: 0.02 NG/ML — SIGNIFICANT CHANGE UP (ref 0–0.06)
TROPONIN T SERPL-MCNC: 0.02 NG/ML — SIGNIFICANT CHANGE UP (ref 0–0.06)
TROPONIN T SERPL-MCNC: 0.06 NG/ML — SIGNIFICANT CHANGE UP (ref 0–0.06)
TSH SERPL-MCNC: 1.45 UIU/ML — SIGNIFICANT CHANGE UP (ref 0.27–4.2)
UROBILINOGEN FLD QL: NEGATIVE — SIGNIFICANT CHANGE UP
VANCOMYCIN TROUGH SERPL-MCNC: 10.9 UG/ML — SIGNIFICANT CHANGE UP (ref 10–20)
VANCOMYCIN TROUGH SERPL-MCNC: <4 UG/ML — LOW (ref 10–20)
WBC # BLD: 10.5 K/UL — SIGNIFICANT CHANGE UP (ref 3.8–10.5)
WBC # BLD: 10.6 K/UL — HIGH (ref 3.8–10.5)
WBC # BLD: 11.2 K/UL — HIGH (ref 3.8–10.5)
WBC # BLD: 14.4 K/UL — HIGH (ref 3.8–10.5)
WBC # BLD: 15.6 K/UL — HIGH (ref 3.8–10.5)
WBC # BLD: 17.2 K/UL — HIGH (ref 3.8–10.5)
WBC # BLD: 20.1 K/UL — HIGH (ref 3.8–10.5)
WBC # BLD: 20.5 K/UL — HIGH (ref 3.8–10.5)
WBC # BLD: 21.2 K/UL — HIGH (ref 3.8–10.5)
WBC # BLD: 4.6 K/UL — SIGNIFICANT CHANGE UP (ref 3.8–10.5)
WBC # BLD: 7.3 K/UL — SIGNIFICANT CHANGE UP (ref 3.8–10.5)
WBC # FLD AUTO: 10.5 K/UL — SIGNIFICANT CHANGE UP (ref 3.8–10.5)
WBC # FLD AUTO: 10.6 K/UL — HIGH (ref 3.8–10.5)
WBC # FLD AUTO: 11.2 K/UL — HIGH (ref 3.8–10.5)
WBC # FLD AUTO: 14.4 K/UL — HIGH (ref 3.8–10.5)
WBC # FLD AUTO: 15.6 K/UL — HIGH (ref 3.8–10.5)
WBC # FLD AUTO: 17.2 K/UL — HIGH (ref 3.8–10.5)
WBC # FLD AUTO: 20.1 K/UL — HIGH (ref 3.8–10.5)
WBC # FLD AUTO: 20.5 K/UL — HIGH (ref 3.8–10.5)
WBC # FLD AUTO: 21.2 K/UL — HIGH (ref 3.8–10.5)
WBC # FLD AUTO: 4.6 K/UL — SIGNIFICANT CHANGE UP (ref 3.8–10.5)
WBC # FLD AUTO: 7.3 K/UL — SIGNIFICANT CHANGE UP (ref 3.8–10.5)

## 2018-01-01 PROCEDURE — 93010 ELECTROCARDIOGRAM REPORT: CPT

## 2018-01-01 PROCEDURE — 84100 ASSAY OF PHOSPHORUS: CPT

## 2018-01-01 PROCEDURE — 99232 SBSQ HOSP IP/OBS MODERATE 35: CPT

## 2018-01-01 PROCEDURE — 97162 PT EVAL MOD COMPLEX 30 MIN: CPT

## 2018-01-01 PROCEDURE — 81001 URINALYSIS AUTO W/SCOPE: CPT

## 2018-01-01 PROCEDURE — 36600 WITHDRAWAL OF ARTERIAL BLOOD: CPT

## 2018-01-01 PROCEDURE — 84295 ASSAY OF SERUM SODIUM: CPT

## 2018-01-01 PROCEDURE — 87633 RESP VIRUS 12-25 TARGETS: CPT

## 2018-01-01 PROCEDURE — 82962 GLUCOSE BLOOD TEST: CPT

## 2018-01-01 PROCEDURE — 99233 SBSQ HOSP IP/OBS HIGH 50: CPT | Mod: GC

## 2018-01-01 PROCEDURE — 85610 PROTHROMBIN TIME: CPT

## 2018-01-01 PROCEDURE — 82803 BLOOD GASES ANY COMBINATION: CPT

## 2018-01-01 PROCEDURE — 99223 1ST HOSP IP/OBS HIGH 75: CPT | Mod: GC

## 2018-01-01 PROCEDURE — 71045 X-RAY EXAM CHEST 1 VIEW: CPT | Mod: 26

## 2018-01-01 PROCEDURE — 99233 SBSQ HOSP IP/OBS HIGH 50: CPT

## 2018-01-01 PROCEDURE — 94640 AIRWAY INHALATION TREATMENT: CPT

## 2018-01-01 PROCEDURE — 82947 ASSAY GLUCOSE BLOOD QUANT: CPT

## 2018-01-01 PROCEDURE — 71045 X-RAY EXAM CHEST 1 VIEW: CPT | Mod: 26,77

## 2018-01-01 PROCEDURE — 99222 1ST HOSP IP/OBS MODERATE 55: CPT

## 2018-01-01 PROCEDURE — 85027 COMPLETE CBC AUTOMATED: CPT

## 2018-01-01 PROCEDURE — 80053 COMPREHEN METABOLIC PANEL: CPT

## 2018-01-01 PROCEDURE — 87040 BLOOD CULTURE FOR BACTERIA: CPT

## 2018-01-01 PROCEDURE — 71046 X-RAY EXAM CHEST 2 VIEWS: CPT

## 2018-01-01 PROCEDURE — 71046 X-RAY EXAM CHEST 2 VIEWS: CPT | Mod: 26

## 2018-01-01 PROCEDURE — 87581 M.PNEUMON DNA AMP PROBE: CPT

## 2018-01-01 PROCEDURE — 80162 ASSAY OF DIGOXIN TOTAL: CPT

## 2018-01-01 PROCEDURE — 93010 ELECTROCARDIOGRAM REPORT: CPT | Mod: 77

## 2018-01-01 PROCEDURE — 87449 NOS EACH ORGANISM AG IA: CPT

## 2018-01-01 PROCEDURE — 84145 PROCALCITONIN (PCT): CPT

## 2018-01-01 PROCEDURE — 96375 TX/PRO/DX INJ NEW DRUG ADDON: CPT

## 2018-01-01 PROCEDURE — 82435 ASSAY OF BLOOD CHLORIDE: CPT

## 2018-01-01 PROCEDURE — 83605 ASSAY OF LACTIC ACID: CPT

## 2018-01-01 PROCEDURE — 82550 ASSAY OF CK (CPK): CPT

## 2018-01-01 PROCEDURE — 94660 CPAP INITIATION&MGMT: CPT

## 2018-01-01 PROCEDURE — 85014 HEMATOCRIT: CPT

## 2018-01-01 PROCEDURE — 82553 CREATINE MB FRACTION: CPT

## 2018-01-01 PROCEDURE — 99291 CRITICAL CARE FIRST HOUR: CPT | Mod: 25

## 2018-01-01 PROCEDURE — 80048 BASIC METABOLIC PNL TOTAL CA: CPT

## 2018-01-01 PROCEDURE — 87798 DETECT AGENT NOS DNA AMP: CPT

## 2018-01-01 PROCEDURE — 87486 CHLMYD PNEUM DNA AMP PROBE: CPT

## 2018-01-01 PROCEDURE — 84132 ASSAY OF SERUM POTASSIUM: CPT

## 2018-01-01 PROCEDURE — 83880 ASSAY OF NATRIURETIC PEPTIDE: CPT

## 2018-01-01 PROCEDURE — 93005 ELECTROCARDIOGRAM TRACING: CPT

## 2018-01-01 PROCEDURE — 96376 TX/PRO/DX INJ SAME DRUG ADON: CPT

## 2018-01-01 PROCEDURE — 83735 ASSAY OF MAGNESIUM: CPT

## 2018-01-01 PROCEDURE — 96374 THER/PROPH/DIAG INJ IV PUSH: CPT

## 2018-01-01 PROCEDURE — 84443 ASSAY THYROID STIM HORMONE: CPT

## 2018-01-01 PROCEDURE — 80202 ASSAY OF VANCOMYCIN: CPT

## 2018-01-01 PROCEDURE — 82330 ASSAY OF CALCIUM: CPT

## 2018-01-01 PROCEDURE — 85730 THROMBOPLASTIN TIME PARTIAL: CPT

## 2018-01-01 PROCEDURE — 87086 URINE CULTURE/COLONY COUNT: CPT

## 2018-01-01 PROCEDURE — 84484 ASSAY OF TROPONIN QUANT: CPT

## 2018-01-01 PROCEDURE — 71045 X-RAY EXAM CHEST 1 VIEW: CPT

## 2018-01-01 RX ORDER — HEPARIN SODIUM 5000 [USP'U]/ML
5000 INJECTION INTRAVENOUS; SUBCUTANEOUS EVERY 12 HOURS
Qty: 0 | Refills: 0 | Status: DISCONTINUED | OUTPATIENT
Start: 2018-01-01 | End: 2018-01-01

## 2018-01-01 RX ORDER — POLYETHYLENE GLYCOL 3350 17 G/17G
17 POWDER, FOR SOLUTION ORAL DAILY
Qty: 0 | Refills: 0 | Status: DISCONTINUED | OUTPATIENT
Start: 2018-01-01 | End: 2018-01-01

## 2018-01-01 RX ORDER — BUDESONIDE, MICRONIZED 100 %
0.25 POWDER (GRAM) MISCELLANEOUS
Qty: 0 | Refills: 0 | Status: DISCONTINUED | OUTPATIENT
Start: 2018-01-01 | End: 2018-01-01

## 2018-01-01 RX ORDER — DIGOXIN 250 MCG
0.25 TABLET ORAL ONCE
Qty: 0 | Refills: 0 | Status: COMPLETED | OUTPATIENT
Start: 2018-01-01 | End: 2018-01-01

## 2018-01-01 RX ORDER — ACETAMINOPHEN 500 MG
650 TABLET ORAL EVERY 6 HOURS
Qty: 0 | Refills: 0 | Status: DISCONTINUED | OUTPATIENT
Start: 2018-01-01 | End: 2018-01-01

## 2018-01-01 RX ORDER — IPRATROPIUM/ALBUTEROL SULFATE 18-103MCG
3 AEROSOL WITH ADAPTER (GRAM) INHALATION ONCE
Qty: 0 | Refills: 0 | Status: COMPLETED | OUTPATIENT
Start: 2018-01-01 | End: 2018-01-01

## 2018-01-01 RX ORDER — MORPHINE SULFATE 50 MG/1
1 CAPSULE, EXTENDED RELEASE ORAL
Qty: 0 | Refills: 0 | Status: DISCONTINUED | OUTPATIENT
Start: 2018-01-01 | End: 2018-01-01

## 2018-01-01 RX ORDER — SENNA PLUS 8.6 MG/1
2 TABLET ORAL AT BEDTIME
Qty: 0 | Refills: 0 | Status: DISCONTINUED | OUTPATIENT
Start: 2018-01-01 | End: 2018-01-01

## 2018-01-01 RX ORDER — FUROSEMIDE 40 MG
40 TABLET ORAL ONCE
Qty: 0 | Refills: 0 | Status: COMPLETED | OUTPATIENT
Start: 2018-01-01 | End: 2018-01-01

## 2018-01-01 RX ORDER — METOPROLOL TARTRATE 50 MG
5 TABLET ORAL EVERY 6 HOURS
Qty: 0 | Refills: 0 | Status: DISCONTINUED | OUTPATIENT
Start: 2018-01-01 | End: 2018-01-01

## 2018-01-01 RX ORDER — MORPHINE SULFATE 50 MG/1
0.5 CAPSULE, EXTENDED RELEASE ORAL EVERY 6 HOURS
Qty: 0 | Refills: 0 | Status: DISCONTINUED | OUTPATIENT
Start: 2018-01-01 | End: 2018-01-01

## 2018-01-01 RX ORDER — FUROSEMIDE 40 MG
20 TABLET ORAL ONCE
Qty: 0 | Refills: 0 | Status: COMPLETED | OUTPATIENT
Start: 2018-01-01 | End: 2018-01-01

## 2018-01-01 RX ORDER — ACETAMINOPHEN 500 MG
1000 TABLET ORAL ONCE
Qty: 0 | Refills: 0 | Status: COMPLETED | OUTPATIENT
Start: 2018-01-01 | End: 2018-01-01

## 2018-01-01 RX ORDER — AZITHROMYCIN 500 MG/1
TABLET, FILM COATED ORAL
Qty: 0 | Refills: 0 | Status: DISCONTINUED | OUTPATIENT
Start: 2018-01-01 | End: 2018-01-01

## 2018-01-01 RX ORDER — CEFEPIME 1 G/1
INJECTION, POWDER, FOR SOLUTION INTRAMUSCULAR; INTRAVENOUS
Qty: 0 | Refills: 0 | Status: DISCONTINUED | OUTPATIENT
Start: 2018-01-01 | End: 2018-01-01

## 2018-01-01 RX ORDER — SODIUM CHLORIDE 9 MG/ML
1000 INJECTION INTRAMUSCULAR; INTRAVENOUS; SUBCUTANEOUS
Qty: 0 | Refills: 0 | Status: COMPLETED | OUTPATIENT
Start: 2018-01-01 | End: 2018-01-01

## 2018-01-01 RX ORDER — IPRATROPIUM BROMIDE 0.2 MG/ML
500 SOLUTION, NON-ORAL INHALATION ONCE
Qty: 0 | Refills: 0 | Status: COMPLETED | OUTPATIENT
Start: 2018-01-01 | End: 2018-01-01

## 2018-01-01 RX ORDER — VANCOMYCIN HCL 1 G
500 VIAL (EA) INTRAVENOUS ONCE
Qty: 0 | Refills: 0 | Status: COMPLETED | OUTPATIENT
Start: 2018-01-01 | End: 2018-01-01

## 2018-01-01 RX ORDER — LEVALBUTEROL 1.25 MG/.5ML
0.63 SOLUTION, CONCENTRATE RESPIRATORY (INHALATION)
Qty: 0 | Refills: 0 | Status: COMPLETED | OUTPATIENT
Start: 2018-01-01 | End: 2018-01-01

## 2018-01-01 RX ORDER — IPRATROPIUM BROMIDE 0.2 MG/ML
500 SOLUTION, NON-ORAL INHALATION ONCE
Qty: 0 | Refills: 0 | Status: DISCONTINUED | OUTPATIENT
Start: 2018-01-01 | End: 2018-01-01

## 2018-01-01 RX ORDER — ACETAMINOPHEN 500 MG
1000 TABLET ORAL ONCE
Qty: 0 | Refills: 0 | Status: DISCONTINUED | OUTPATIENT
Start: 2018-01-01 | End: 2018-01-01

## 2018-01-01 RX ORDER — IPRATROPIUM BROMIDE 0.2 MG/ML
500 SOLUTION, NON-ORAL INHALATION
Qty: 0 | Refills: 0 | Status: COMPLETED | OUTPATIENT
Start: 2018-01-01 | End: 2018-01-01

## 2018-01-01 RX ORDER — MAGNESIUM SULFATE 500 MG/ML
1 VIAL (ML) INJECTION ONCE
Qty: 0 | Refills: 0 | Status: COMPLETED | OUTPATIENT
Start: 2018-01-01 | End: 2018-01-01

## 2018-01-01 RX ORDER — SODIUM CHLORIDE 9 MG/ML
1000 INJECTION, SOLUTION INTRAVENOUS
Qty: 0 | Refills: 0 | Status: DISCONTINUED | OUTPATIENT
Start: 2018-01-01 | End: 2018-01-01

## 2018-01-01 RX ORDER — LEVALBUTEROL 1.25 MG/.5ML
0.63 SOLUTION, CONCENTRATE RESPIRATORY (INHALATION) ONCE
Qty: 0 | Refills: 0 | Status: COMPLETED | OUTPATIENT
Start: 2018-01-01 | End: 2018-01-01

## 2018-01-01 RX ORDER — SODIUM CHLORIDE 9 MG/ML
500 INJECTION INTRAMUSCULAR; INTRAVENOUS; SUBCUTANEOUS
Qty: 0 | Refills: 0 | Status: COMPLETED | OUTPATIENT
Start: 2018-01-01 | End: 2018-01-01

## 2018-01-01 RX ORDER — AMIODARONE HYDROCHLORIDE 400 MG/1
150 TABLET ORAL ONCE
Qty: 0 | Refills: 0 | Status: DISCONTINUED | OUTPATIENT
Start: 2018-01-01 | End: 2018-01-01

## 2018-01-01 RX ORDER — DIGOXIN 250 MCG
0.12 TABLET ORAL ONCE
Qty: 0 | Refills: 0 | Status: COMPLETED | OUTPATIENT
Start: 2018-01-01 | End: 2018-01-01

## 2018-01-01 RX ORDER — SODIUM CHLORIDE 9 MG/ML
500 INJECTION INTRAMUSCULAR; INTRAVENOUS; SUBCUTANEOUS ONCE
Qty: 0 | Refills: 0 | Status: COMPLETED | OUTPATIENT
Start: 2018-01-01 | End: 2018-01-01

## 2018-01-01 RX ORDER — PANTOPRAZOLE SODIUM 20 MG/1
40 TABLET, DELAYED RELEASE ORAL DAILY
Qty: 0 | Refills: 0 | Status: DISCONTINUED | OUTPATIENT
Start: 2018-01-01 | End: 2018-01-01

## 2018-01-01 RX ORDER — CHOLECALCIFEROL (VITAMIN D3) 125 MCG
1000 CAPSULE ORAL
Qty: 0 | Refills: 0 | Status: DISCONTINUED | OUTPATIENT
Start: 2018-01-01 | End: 2018-01-01

## 2018-01-01 RX ORDER — CEFEPIME 1 G/1
1000 INJECTION, POWDER, FOR SOLUTION INTRAMUSCULAR; INTRAVENOUS ONCE
Qty: 0 | Refills: 0 | Status: COMPLETED | OUTPATIENT
Start: 2018-01-01 | End: 2018-01-01

## 2018-01-01 RX ORDER — ALBUTEROL 90 UG/1
2.5 AEROSOL, METERED ORAL ONCE
Qty: 0 | Refills: 0 | Status: COMPLETED | OUTPATIENT
Start: 2018-01-01 | End: 2018-01-01

## 2018-01-01 RX ORDER — SODIUM CHLORIDE 0.65 %
1 AEROSOL, SPRAY (ML) NASAL
Qty: 0 | Refills: 0 | Status: DISCONTINUED | OUTPATIENT
Start: 2018-01-01 | End: 2018-01-01

## 2018-01-01 RX ORDER — ACETAMINOPHEN 500 MG
650 TABLET ORAL ONCE
Qty: 0 | Refills: 0 | Status: COMPLETED | OUTPATIENT
Start: 2018-01-01 | End: 2018-01-01

## 2018-01-01 RX ORDER — MORPHINE SULFATE 50 MG/1
1 CAPSULE, EXTENDED RELEASE ORAL EVERY 4 HOURS
Qty: 0 | Refills: 0 | Status: DISCONTINUED | OUTPATIENT
Start: 2018-01-01 | End: 2018-01-01

## 2018-01-01 RX ORDER — IPRATROPIUM BROMIDE 0.2 MG/ML
1 SOLUTION, NON-ORAL INHALATION ONCE
Qty: 0 | Refills: 0 | Status: DISCONTINUED | OUTPATIENT
Start: 2018-01-01 | End: 2018-01-01

## 2018-01-01 RX ORDER — TIOTROPIUM BROMIDE 18 UG/1
1 CAPSULE ORAL; RESPIRATORY (INHALATION) DAILY
Qty: 0 | Refills: 0 | Status: DISCONTINUED | OUTPATIENT
Start: 2018-01-01 | End: 2018-01-01

## 2018-01-01 RX ORDER — AZITHROMYCIN 500 MG/1
500 TABLET, FILM COATED ORAL EVERY 24 HOURS
Qty: 0 | Refills: 0 | Status: DISCONTINUED | OUTPATIENT
Start: 2018-01-01 | End: 2018-01-01

## 2018-01-01 RX ORDER — SODIUM CHLORIDE 9 MG/ML
3 INJECTION INTRAMUSCULAR; INTRAVENOUS; SUBCUTANEOUS ONCE
Qty: 0 | Refills: 0 | Status: COMPLETED | OUTPATIENT
Start: 2018-01-01 | End: 2018-01-01

## 2018-01-01 RX ORDER — DOCUSATE SODIUM 100 MG
100 CAPSULE ORAL
Qty: 0 | Refills: 0 | Status: DISCONTINUED | OUTPATIENT
Start: 2018-01-01 | End: 2018-01-01

## 2018-01-01 RX ORDER — FUROSEMIDE 40 MG
40 TABLET ORAL EVERY 12 HOURS
Qty: 0 | Refills: 0 | Status: DISCONTINUED | OUTPATIENT
Start: 2018-01-01 | End: 2018-01-01

## 2018-01-01 RX ORDER — VANCOMYCIN HCL 1 G
750 VIAL (EA) INTRAVENOUS EVERY 24 HOURS
Qty: 0 | Refills: 0 | Status: DISCONTINUED | OUTPATIENT
Start: 2018-01-01 | End: 2018-01-01

## 2018-01-01 RX ORDER — CEFEPIME 1 G/1
1000 INJECTION, POWDER, FOR SOLUTION INTRAMUSCULAR; INTRAVENOUS EVERY 12 HOURS
Qty: 0 | Refills: 0 | Status: DISCONTINUED | OUTPATIENT
Start: 2018-01-01 | End: 2018-01-01

## 2018-01-01 RX ORDER — AMIODARONE HYDROCHLORIDE 400 MG/1
150 TABLET ORAL ONCE
Qty: 0 | Refills: 0 | Status: COMPLETED | OUTPATIENT
Start: 2018-01-01 | End: 2018-01-01

## 2018-01-01 RX ORDER — IPRATROPIUM/ALBUTEROL SULFATE 18-103MCG
3 AEROSOL WITH ADAPTER (GRAM) INHALATION EVERY 6 HOURS
Qty: 0 | Refills: 0 | Status: DISCONTINUED | OUTPATIENT
Start: 2018-01-01 | End: 2018-01-01

## 2018-01-01 RX ORDER — LEVALBUTEROL 1.25 MG/.5ML
1.25 SOLUTION, CONCENTRATE RESPIRATORY (INHALATION) ONCE
Qty: 0 | Refills: 0 | Status: COMPLETED | OUTPATIENT
Start: 2018-01-01 | End: 2018-01-01

## 2018-01-01 RX ORDER — MORPHINE SULFATE 50 MG/1
0.5 CAPSULE, EXTENDED RELEASE ORAL EVERY 4 HOURS
Qty: 0 | Refills: 0 | Status: DISCONTINUED | OUTPATIENT
Start: 2018-01-01 | End: 2018-01-01

## 2018-01-01 RX ORDER — MORPHINE SULFATE 50 MG/1
0.5 CAPSULE, EXTENDED RELEASE ORAL ONCE
Qty: 0 | Refills: 0 | Status: DISCONTINUED | OUTPATIENT
Start: 2018-01-01 | End: 2018-01-01

## 2018-01-01 RX ORDER — LIDOCAINE 4 G/100G
1 CREAM TOPICAL DAILY
Qty: 0 | Refills: 0 | Status: DISCONTINUED | OUTPATIENT
Start: 2018-01-01 | End: 2018-01-01

## 2018-01-01 RX ORDER — HALOPERIDOL DECANOATE 100 MG/ML
1 INJECTION INTRAMUSCULAR ONCE
Qty: 0 | Refills: 0 | Status: COMPLETED | OUTPATIENT
Start: 2018-01-01 | End: 2018-01-01

## 2018-01-01 RX ORDER — HALOPERIDOL DECANOATE 100 MG/ML
0.5 INJECTION INTRAMUSCULAR
Qty: 0 | Refills: 0 | Status: DISCONTINUED | OUTPATIENT
Start: 2018-01-01 | End: 2018-01-01

## 2018-01-01 RX ORDER — HALOPERIDOL DECANOATE 100 MG/ML
1 INJECTION INTRAMUSCULAR
Qty: 0 | Refills: 0 | Status: DISCONTINUED | OUTPATIENT
Start: 2018-01-01 | End: 2018-01-01

## 2018-01-01 RX ORDER — MORPHINE SULFATE 50 MG/1
0.25 CAPSULE, EXTENDED RELEASE ORAL ONCE
Qty: 0 | Refills: 0 | Status: DISCONTINUED | OUTPATIENT
Start: 2018-01-01 | End: 2018-01-01

## 2018-01-01 RX ORDER — AZITHROMYCIN 500 MG/1
500 TABLET, FILM COATED ORAL ONCE
Qty: 0 | Refills: 0 | Status: COMPLETED | OUTPATIENT
Start: 2018-01-01 | End: 2018-01-01

## 2018-01-01 RX ORDER — LEVALBUTEROL 1.25 MG/.5ML
0.63 SOLUTION, CONCENTRATE RESPIRATORY (INHALATION) EVERY 6 HOURS
Qty: 0 | Refills: 0 | Status: COMPLETED | OUTPATIENT
Start: 2018-01-01 | End: 2018-01-01

## 2018-01-01 RX ORDER — CHOLECALCIFEROL (VITAMIN D3) 125 MCG
1 CAPSULE ORAL
Qty: 0 | Refills: 0 | COMMUNITY

## 2018-01-01 RX ADMIN — MORPHINE SULFATE 0.5 MILLIGRAM(S): 50 CAPSULE, EXTENDED RELEASE ORAL at 16:25

## 2018-01-01 RX ADMIN — Medication 20 MILLIGRAM(S): at 06:06

## 2018-01-01 RX ADMIN — Medication 650 MILLIGRAM(S): at 15:00

## 2018-01-01 RX ADMIN — Medication 100 MILLIGRAM(S): at 17:55

## 2018-01-01 RX ADMIN — MORPHINE SULFATE 1 MILLIGRAM(S): 50 CAPSULE, EXTENDED RELEASE ORAL at 23:22

## 2018-01-01 RX ADMIN — MORPHINE SULFATE 0.5 MILLIGRAM(S): 50 CAPSULE, EXTENDED RELEASE ORAL at 01:01

## 2018-01-01 RX ADMIN — Medication 20 MILLIGRAM(S): at 14:03

## 2018-01-01 RX ADMIN — MORPHINE SULFATE 0.5 MILLIGRAM(S): 50 CAPSULE, EXTENDED RELEASE ORAL at 06:06

## 2018-01-01 RX ADMIN — SODIUM CHLORIDE 50 MILLILITER(S): 9 INJECTION, SOLUTION INTRAVENOUS at 06:16

## 2018-01-01 RX ADMIN — Medication 3 MILLILITER(S): at 23:33

## 2018-01-01 RX ADMIN — MORPHINE SULFATE 0.5 MILLIGRAM(S): 50 CAPSULE, EXTENDED RELEASE ORAL at 10:10

## 2018-01-01 RX ADMIN — HALOPERIDOL DECANOATE 1 MILLIGRAM(S): 100 INJECTION INTRAMUSCULAR at 00:38

## 2018-01-01 RX ADMIN — Medication 3 MILLILITER(S): at 16:11

## 2018-01-01 RX ADMIN — Medication 3 MILLILITER(S): at 17:58

## 2018-01-01 RX ADMIN — Medication 3 MILLILITER(S): at 05:47

## 2018-01-01 RX ADMIN — LEVALBUTEROL 0.63 MILLIGRAM(S): 1.25 SOLUTION, CONCENTRATE RESPIRATORY (INHALATION) at 23:06

## 2018-01-01 RX ADMIN — LEVALBUTEROL 1.25 MILLIGRAM(S): 1.25 SOLUTION, CONCENTRATE RESPIRATORY (INHALATION) at 08:08

## 2018-01-01 RX ADMIN — HEPARIN SODIUM 5000 UNIT(S): 5000 INJECTION INTRAVENOUS; SUBCUTANEOUS at 18:39

## 2018-01-01 RX ADMIN — Medication 20 MILLIGRAM(S): at 13:30

## 2018-01-01 RX ADMIN — Medication 3 MILLILITER(S): at 05:25

## 2018-01-01 RX ADMIN — HEPARIN SODIUM 5000 UNIT(S): 5000 INJECTION INTRAVENOUS; SUBCUTANEOUS at 06:29

## 2018-01-01 RX ADMIN — Medication 0.25 MILLIGRAM(S): at 17:53

## 2018-01-01 RX ADMIN — SODIUM CHLORIDE 3 MILLILITER(S): 9 INJECTION INTRAMUSCULAR; INTRAVENOUS; SUBCUTANEOUS at 11:09

## 2018-01-01 RX ADMIN — Medication 1000 UNIT(S): at 05:18

## 2018-01-01 RX ADMIN — Medication 100 GRAM(S): at 01:58

## 2018-01-01 RX ADMIN — Medication 20 MILLIGRAM(S): at 12:47

## 2018-01-01 RX ADMIN — Medication 0.25 MILLIGRAM(S): at 05:06

## 2018-01-01 RX ADMIN — HEPARIN SODIUM 5000 UNIT(S): 5000 INJECTION INTRAVENOUS; SUBCUTANEOUS at 05:57

## 2018-01-01 RX ADMIN — Medication 600 MILLIGRAM(S): at 05:25

## 2018-01-01 RX ADMIN — MORPHINE SULFATE 0.5 MILLIGRAM(S): 50 CAPSULE, EXTENDED RELEASE ORAL at 16:05

## 2018-01-01 RX ADMIN — Medication 1000 UNIT(S): at 05:33

## 2018-01-01 RX ADMIN — MORPHINE SULFATE 0.5 MILLIGRAM(S): 50 CAPSULE, EXTENDED RELEASE ORAL at 05:23

## 2018-01-01 RX ADMIN — Medication 3 MILLILITER(S): at 02:30

## 2018-01-01 RX ADMIN — MORPHINE SULFATE 0.5 MILLIGRAM(S): 50 CAPSULE, EXTENDED RELEASE ORAL at 11:06

## 2018-01-01 RX ADMIN — Medication 150 MILLIGRAM(S): at 15:45

## 2018-01-01 RX ADMIN — Medication 3 MILLILITER(S): at 00:42

## 2018-01-01 RX ADMIN — Medication 0.25 MILLIGRAM(S): at 06:16

## 2018-01-01 RX ADMIN — SODIUM CHLORIDE 2000 MILLILITER(S): 9 INJECTION INTRAMUSCULAR; INTRAVENOUS; SUBCUTANEOUS at 11:16

## 2018-01-01 RX ADMIN — Medication 20 MILLIGRAM(S): at 14:41

## 2018-01-01 RX ADMIN — Medication 3 MILLILITER(S): at 05:33

## 2018-01-01 RX ADMIN — Medication 3 MILLILITER(S): at 23:22

## 2018-01-01 RX ADMIN — Medication 650 MILLIGRAM(S): at 13:59

## 2018-01-01 RX ADMIN — Medication 500 MICROGRAM(S): at 06:10

## 2018-01-01 RX ADMIN — Medication 1 SPRAY(S): at 12:28

## 2018-01-01 RX ADMIN — LEVALBUTEROL 0.63 MILLIGRAM(S): 1.25 SOLUTION, CONCENTRATE RESPIRATORY (INHALATION) at 01:26

## 2018-01-01 RX ADMIN — Medication 1000 UNIT(S): at 17:59

## 2018-01-01 RX ADMIN — PANTOPRAZOLE SODIUM 40 MILLIGRAM(S): 20 TABLET, DELAYED RELEASE ORAL at 13:29

## 2018-01-01 RX ADMIN — Medication 3 MILLILITER(S): at 16:15

## 2018-01-01 RX ADMIN — Medication 3 MILLILITER(S): at 17:55

## 2018-01-01 RX ADMIN — Medication 40 MILLIGRAM(S): at 22:20

## 2018-01-01 RX ADMIN — Medication 200 MILLIGRAM(S): at 17:54

## 2018-01-01 RX ADMIN — LIDOCAINE 1 PATCH: 4 CREAM TOPICAL at 17:54

## 2018-01-01 RX ADMIN — HALOPERIDOL DECANOATE 0.5 MILLIGRAM(S): 100 INJECTION INTRAMUSCULAR at 06:51

## 2018-01-01 RX ADMIN — HEPARIN SODIUM 5000 UNIT(S): 5000 INJECTION INTRAVENOUS; SUBCUTANEOUS at 16:25

## 2018-01-01 RX ADMIN — Medication 0.25 MILLIGRAM(S): at 05:33

## 2018-01-01 RX ADMIN — Medication 3 MILLILITER(S): at 02:57

## 2018-01-01 RX ADMIN — CEFEPIME 100 MILLIGRAM(S): 1 INJECTION, POWDER, FOR SOLUTION INTRAMUSCULAR; INTRAVENOUS at 05:05

## 2018-01-01 RX ADMIN — Medication 1000 UNIT(S): at 06:16

## 2018-01-01 RX ADMIN — PANTOPRAZOLE SODIUM 40 MILLIGRAM(S): 20 TABLET, DELAYED RELEASE ORAL at 13:00

## 2018-01-01 RX ADMIN — MORPHINE SULFATE 0.25 MILLIGRAM(S): 50 CAPSULE, EXTENDED RELEASE ORAL at 12:31

## 2018-01-01 RX ADMIN — Medication 3 MILLILITER(S): at 23:12

## 2018-01-01 RX ADMIN — Medication 3 MILLILITER(S): at 11:56

## 2018-01-01 RX ADMIN — Medication 1000 UNIT(S): at 16:42

## 2018-01-01 RX ADMIN — Medication 200 MILLIGRAM(S): at 12:28

## 2018-01-01 RX ADMIN — MORPHINE SULFATE 0.5 MILLIGRAM(S): 50 CAPSULE, EXTENDED RELEASE ORAL at 02:38

## 2018-01-01 RX ADMIN — Medication 20 MILLIGRAM(S): at 21:41

## 2018-01-01 RX ADMIN — MORPHINE SULFATE 0.5 MILLIGRAM(S): 50 CAPSULE, EXTENDED RELEASE ORAL at 10:40

## 2018-01-01 RX ADMIN — MORPHINE SULFATE 0.5 MILLIGRAM(S): 50 CAPSULE, EXTENDED RELEASE ORAL at 18:48

## 2018-01-01 RX ADMIN — Medication 100 MILLIGRAM(S): at 16:42

## 2018-01-01 RX ADMIN — Medication 3 MILLILITER(S): at 06:13

## 2018-01-01 RX ADMIN — HEPARIN SODIUM 5000 UNIT(S): 5000 INJECTION INTRAVENOUS; SUBCUTANEOUS at 17:33

## 2018-01-01 RX ADMIN — Medication 200 MILLIGRAM(S): at 05:47

## 2018-01-01 RX ADMIN — Medication 3 MILLILITER(S): at 00:56

## 2018-01-01 RX ADMIN — Medication 20 MILLIGRAM(S): at 22:20

## 2018-01-01 RX ADMIN — HALOPERIDOL DECANOATE 1 MILLIGRAM(S): 100 INJECTION INTRAMUSCULAR at 11:01

## 2018-01-01 RX ADMIN — Medication 40 MILLIGRAM(S): at 11:15

## 2018-01-01 RX ADMIN — Medication 1000 UNIT(S): at 17:55

## 2018-01-01 RX ADMIN — Medication 3 MILLILITER(S): at 23:05

## 2018-01-01 RX ADMIN — Medication 40 MILLIGRAM(S): at 10:48

## 2018-01-01 RX ADMIN — Medication 600 MILLIGRAM(S): at 18:08

## 2018-01-01 RX ADMIN — HEPARIN SODIUM 5000 UNIT(S): 5000 INJECTION INTRAVENOUS; SUBCUTANEOUS at 05:17

## 2018-01-01 RX ADMIN — Medication 150 MILLIGRAM(S): at 14:51

## 2018-01-01 RX ADMIN — MORPHINE SULFATE 0.5 MILLIGRAM(S): 50 CAPSULE, EXTENDED RELEASE ORAL at 00:56

## 2018-01-01 RX ADMIN — Medication 3 MILLILITER(S): at 11:00

## 2018-01-01 RX ADMIN — Medication 40 MILLIGRAM(S): at 12:09

## 2018-01-01 RX ADMIN — Medication 3 MILLILITER(S): at 11:25

## 2018-01-01 RX ADMIN — MORPHINE SULFATE 1 MILLIGRAM(S): 50 CAPSULE, EXTENDED RELEASE ORAL at 14:04

## 2018-01-01 RX ADMIN — Medication 20 MILLIGRAM(S): at 14:53

## 2018-01-01 RX ADMIN — AZITHROMYCIN 250 MILLIGRAM(S): 500 TABLET, FILM COATED ORAL at 13:07

## 2018-01-01 RX ADMIN — Medication 3 MILLILITER(S): at 05:22

## 2018-01-01 RX ADMIN — Medication 30 MILLIGRAM(S): at 11:48

## 2018-01-01 RX ADMIN — HALOPERIDOL DECANOATE 0.5 MILLIGRAM(S): 100 INJECTION INTRAMUSCULAR at 22:33

## 2018-01-01 RX ADMIN — Medication 3 MILLILITER(S): at 11:35

## 2018-01-01 RX ADMIN — AZITHROMYCIN 250 MILLIGRAM(S): 500 TABLET, FILM COATED ORAL at 12:13

## 2018-01-01 RX ADMIN — AZITHROMYCIN 250 MILLIGRAM(S): 500 TABLET, FILM COATED ORAL at 12:25

## 2018-01-01 RX ADMIN — Medication 0.25 MILLIGRAM(S): at 05:42

## 2018-01-01 RX ADMIN — MORPHINE SULFATE 0.5 MILLIGRAM(S): 50 CAPSULE, EXTENDED RELEASE ORAL at 17:55

## 2018-01-01 RX ADMIN — Medication 20 MILLIGRAM(S): at 21:17

## 2018-01-01 RX ADMIN — LIDOCAINE 1 PATCH: 4 CREAM TOPICAL at 11:25

## 2018-01-01 RX ADMIN — HEPARIN SODIUM 5000 UNIT(S): 5000 INJECTION INTRAVENOUS; SUBCUTANEOUS at 17:54

## 2018-01-01 RX ADMIN — ALBUTEROL 2.5 MILLIGRAM(S): 90 AEROSOL, METERED ORAL at 12:07

## 2018-01-01 RX ADMIN — Medication 200 MILLIGRAM(S): at 23:33

## 2018-01-01 RX ADMIN — AZITHROMYCIN 250 MILLIGRAM(S): 500 TABLET, FILM COATED ORAL at 16:20

## 2018-01-01 RX ADMIN — Medication 3 MILLILITER(S): at 11:41

## 2018-01-01 RX ADMIN — CEFEPIME 100 MILLIGRAM(S): 1 INJECTION, POWDER, FOR SOLUTION INTRAMUSCULAR; INTRAVENOUS at 06:10

## 2018-01-01 RX ADMIN — MORPHINE SULFATE 0.5 MILLIGRAM(S): 50 CAPSULE, EXTENDED RELEASE ORAL at 14:41

## 2018-01-01 RX ADMIN — Medication 20 MILLIGRAM(S): at 05:56

## 2018-01-01 RX ADMIN — Medication 1000 UNIT(S): at 18:04

## 2018-01-01 RX ADMIN — Medication 0.25 MILLIGRAM(S): at 06:18

## 2018-01-01 RX ADMIN — Medication 30 MILLIGRAM(S): at 11:58

## 2018-01-01 RX ADMIN — Medication 3 MILLILITER(S): at 07:15

## 2018-01-01 RX ADMIN — Medication 3 MILLILITER(S): at 05:15

## 2018-01-01 RX ADMIN — CEFEPIME 100 MILLIGRAM(S): 1 INJECTION, POWDER, FOR SOLUTION INTRAMUSCULAR; INTRAVENOUS at 16:26

## 2018-01-01 RX ADMIN — Medication 20 MILLIGRAM(S): at 05:06

## 2018-01-01 RX ADMIN — Medication 110 MILLIGRAM(S): at 13:23

## 2018-01-01 RX ADMIN — Medication 1000 UNIT(S): at 16:11

## 2018-01-01 RX ADMIN — Medication 3 MILLILITER(S): at 23:50

## 2018-01-01 RX ADMIN — Medication 20 MILLIGRAM(S): at 21:22

## 2018-01-01 RX ADMIN — Medication 20 MILLIGRAM(S): at 05:44

## 2018-01-01 RX ADMIN — MORPHINE SULFATE 0.5 MILLIGRAM(S): 50 CAPSULE, EXTENDED RELEASE ORAL at 21:20

## 2018-01-01 RX ADMIN — LIDOCAINE 1 PATCH: 4 CREAM TOPICAL at 05:38

## 2018-01-01 RX ADMIN — Medication 20 MILLIGRAM(S): at 08:36

## 2018-01-01 RX ADMIN — Medication 3 MILLILITER(S): at 17:09

## 2018-01-01 RX ADMIN — Medication 20 MILLIGRAM(S): at 13:07

## 2018-01-01 RX ADMIN — SODIUM CHLORIDE 2000 MILLILITER(S): 9 INJECTION INTRAMUSCULAR; INTRAVENOUS; SUBCUTANEOUS at 13:41

## 2018-01-01 RX ADMIN — Medication 3 MILLILITER(S): at 12:59

## 2018-01-01 RX ADMIN — Medication 3 MILLILITER(S): at 00:17

## 2018-01-01 RX ADMIN — Medication 20 MILLIGRAM(S): at 21:38

## 2018-01-01 RX ADMIN — HEPARIN SODIUM 5000 UNIT(S): 5000 INJECTION INTRAVENOUS; SUBCUTANEOUS at 18:01

## 2018-01-01 RX ADMIN — Medication 600 MILLIGRAM(S): at 05:15

## 2018-01-01 RX ADMIN — LEVALBUTEROL 0.63 MILLIGRAM(S): 1.25 SOLUTION, CONCENTRATE RESPIRATORY (INHALATION) at 00:39

## 2018-01-01 RX ADMIN — Medication 3 MILLILITER(S): at 23:48

## 2018-01-01 RX ADMIN — MORPHINE SULFATE 0.5 MILLIGRAM(S): 50 CAPSULE, EXTENDED RELEASE ORAL at 14:06

## 2018-01-01 RX ADMIN — HEPARIN SODIUM 5000 UNIT(S): 5000 INJECTION INTRAVENOUS; SUBCUTANEOUS at 18:04

## 2018-01-01 RX ADMIN — LIDOCAINE 1 PATCH: 4 CREAM TOPICAL at 00:40

## 2018-01-01 RX ADMIN — LIDOCAINE 1 PATCH: 4 CREAM TOPICAL at 11:00

## 2018-01-01 RX ADMIN — Medication 3 MILLILITER(S): at 17:54

## 2018-01-01 RX ADMIN — Medication 3 MILLILITER(S): at 18:00

## 2018-01-01 RX ADMIN — HEPARIN SODIUM 5000 UNIT(S): 5000 INJECTION INTRAVENOUS; SUBCUTANEOUS at 06:16

## 2018-01-01 RX ADMIN — Medication 500 MICROGRAM(S): at 11:15

## 2018-01-01 RX ADMIN — Medication 20 MILLIGRAM(S): at 13:18

## 2018-01-01 RX ADMIN — PANTOPRAZOLE SODIUM 40 MILLIGRAM(S): 20 TABLET, DELAYED RELEASE ORAL at 11:26

## 2018-01-01 RX ADMIN — HEPARIN SODIUM 5000 UNIT(S): 5000 INJECTION INTRAVENOUS; SUBCUTANEOUS at 05:33

## 2018-01-01 RX ADMIN — HEPARIN SODIUM 5000 UNIT(S): 5000 INJECTION INTRAVENOUS; SUBCUTANEOUS at 16:11

## 2018-01-01 RX ADMIN — Medication 0.25 MILLIGRAM(S): at 18:39

## 2018-01-01 RX ADMIN — Medication 20 MILLIGRAM(S): at 21:51

## 2018-01-01 RX ADMIN — LIDOCAINE 1 PATCH: 4 CREAM TOPICAL at 12:12

## 2018-01-01 RX ADMIN — Medication 1000 UNIT(S): at 17:33

## 2018-01-01 RX ADMIN — CEFEPIME 100 MILLIGRAM(S): 1 INJECTION, POWDER, FOR SOLUTION INTRAMUSCULAR; INTRAVENOUS at 18:37

## 2018-01-01 RX ADMIN — Medication 100 MILLIGRAM(S): at 06:13

## 2018-01-01 RX ADMIN — CEFEPIME 100 MILLIGRAM(S): 1 INJECTION, POWDER, FOR SOLUTION INTRAMUSCULAR; INTRAVENOUS at 05:19

## 2018-01-01 RX ADMIN — Medication 40 MILLIGRAM(S): at 21:19

## 2018-01-01 RX ADMIN — Medication 0.25 MILLIGRAM(S): at 17:58

## 2018-01-01 RX ADMIN — CEFEPIME 100 MILLIGRAM(S): 1 INJECTION, POWDER, FOR SOLUTION INTRAMUSCULAR; INTRAVENOUS at 17:58

## 2018-01-01 RX ADMIN — Medication 20 MILLIGRAM(S): at 05:23

## 2018-01-01 RX ADMIN — Medication 3 MILLILITER(S): at 12:52

## 2018-01-01 RX ADMIN — Medication 1000 UNIT(S): at 06:13

## 2018-01-01 RX ADMIN — Medication 600 MILLIGRAM(S): at 05:44

## 2018-01-01 RX ADMIN — POLYETHYLENE GLYCOL 3350 17 GRAM(S): 17 POWDER, FOR SOLUTION ORAL at 09:45

## 2018-01-01 RX ADMIN — MORPHINE SULFATE 0.5 MILLIGRAM(S): 50 CAPSULE, EXTENDED RELEASE ORAL at 21:37

## 2018-01-01 RX ADMIN — LIDOCAINE 1 PATCH: 4 CREAM TOPICAL at 21:00

## 2018-01-01 RX ADMIN — Medication 20 MILLIGRAM(S): at 05:15

## 2018-01-01 RX ADMIN — Medication 260 MILLIGRAM(S): at 03:13

## 2018-01-01 RX ADMIN — MORPHINE SULFATE 0.5 MILLIGRAM(S): 50 CAPSULE, EXTENDED RELEASE ORAL at 11:25

## 2018-01-01 RX ADMIN — Medication 600 MILLIGRAM(S): at 06:16

## 2018-01-01 RX ADMIN — Medication 600 MILLIGRAM(S): at 05:57

## 2018-01-01 RX ADMIN — Medication 20 MILLIGRAM(S): at 07:31

## 2018-01-01 RX ADMIN — Medication 100 MILLIGRAM(S): at 18:39

## 2018-01-01 RX ADMIN — Medication 1000 UNIT(S): at 05:15

## 2018-01-01 RX ADMIN — Medication 40 MILLIGRAM(S): at 11:16

## 2018-01-01 RX ADMIN — Medication 3 MILLILITER(S): at 11:48

## 2018-01-01 RX ADMIN — MORPHINE SULFATE 1 MILLIGRAM(S): 50 CAPSULE, EXTENDED RELEASE ORAL at 23:37

## 2018-01-01 RX ADMIN — SENNA PLUS 2 TABLET(S): 8.6 TABLET ORAL at 21:47

## 2018-01-01 RX ADMIN — CEFEPIME 100 MILLIGRAM(S): 1 INJECTION, POWDER, FOR SOLUTION INTRAMUSCULAR; INTRAVENOUS at 10:36

## 2018-01-01 RX ADMIN — Medication 20 MILLIGRAM(S): at 14:39

## 2018-01-01 RX ADMIN — Medication 3 MILLILITER(S): at 18:47

## 2018-01-01 RX ADMIN — Medication 100 MILLIGRAM(S): at 05:44

## 2018-01-01 RX ADMIN — Medication 40 MILLIGRAM(S): at 21:41

## 2018-01-01 RX ADMIN — Medication 3 MILLILITER(S): at 18:41

## 2018-01-01 RX ADMIN — LIDOCAINE 1 PATCH: 4 CREAM TOPICAL at 00:42

## 2018-01-01 RX ADMIN — HEPARIN SODIUM 5000 UNIT(S): 5000 INJECTION INTRAVENOUS; SUBCUTANEOUS at 06:12

## 2018-01-01 RX ADMIN — Medication 3 MILLILITER(S): at 03:13

## 2018-01-01 RX ADMIN — Medication 40 MILLIGRAM(S): at 11:05

## 2018-01-01 RX ADMIN — Medication 20 MILLIGRAM(S): at 21:53

## 2018-01-01 RX ADMIN — POLYETHYLENE GLYCOL 3350 17 GRAM(S): 17 POWDER, FOR SOLUTION ORAL at 11:25

## 2018-01-01 RX ADMIN — MORPHINE SULFATE 0.5 MILLIGRAM(S): 50 CAPSULE, EXTENDED RELEASE ORAL at 18:24

## 2018-01-01 RX ADMIN — Medication 1000 UNIT(S): at 06:29

## 2018-01-01 RX ADMIN — LEVALBUTEROL 0.63 MILLIGRAM(S): 1.25 SOLUTION, CONCENTRATE RESPIRATORY (INHALATION) at 23:11

## 2018-01-01 RX ADMIN — Medication 3 MILLILITER(S): at 12:10

## 2018-01-01 RX ADMIN — Medication 3 MILLILITER(S): at 00:21

## 2018-01-01 RX ADMIN — MORPHINE SULFATE 0.5 MILLIGRAM(S): 50 CAPSULE, EXTENDED RELEASE ORAL at 00:22

## 2018-01-01 RX ADMIN — MORPHINE SULFATE 0.5 MILLIGRAM(S): 50 CAPSULE, EXTENDED RELEASE ORAL at 03:10

## 2018-01-01 RX ADMIN — MORPHINE SULFATE 0.5 MILLIGRAM(S): 50 CAPSULE, EXTENDED RELEASE ORAL at 11:01

## 2018-01-01 RX ADMIN — Medication 40 MILLIGRAM(S): at 15:44

## 2018-01-01 RX ADMIN — Medication 3 MILLILITER(S): at 12:47

## 2018-01-01 RX ADMIN — Medication 3 MILLILITER(S): at 17:33

## 2018-01-01 RX ADMIN — Medication 40 MILLIGRAM(S): at 21:17

## 2018-01-01 RX ADMIN — Medication 650 MILLIGRAM(S): at 03:28

## 2018-01-01 RX ADMIN — LIDOCAINE 1 PATCH: 4 CREAM TOPICAL at 13:01

## 2018-01-01 RX ADMIN — Medication 650 MILLIGRAM(S): at 01:00

## 2018-01-01 RX ADMIN — Medication 3 MILLILITER(S): at 11:03

## 2018-01-01 RX ADMIN — PANTOPRAZOLE SODIUM 40 MILLIGRAM(S): 20 TABLET, DELAYED RELEASE ORAL at 12:17

## 2018-01-01 RX ADMIN — Medication 20 MILLIGRAM(S): at 23:04

## 2018-01-01 RX ADMIN — Medication 1000 UNIT(S): at 05:43

## 2018-01-01 RX ADMIN — Medication 500 MICROGRAM(S): at 02:15

## 2018-01-01 RX ADMIN — HEPARIN SODIUM 5000 UNIT(S): 5000 INJECTION INTRAVENOUS; SUBCUTANEOUS at 05:44

## 2018-01-01 RX ADMIN — HEPARIN SODIUM 5000 UNIT(S): 5000 INJECTION INTRAVENOUS; SUBCUTANEOUS at 17:09

## 2018-01-01 RX ADMIN — MORPHINE SULFATE 0.5 MILLIGRAM(S): 50 CAPSULE, EXTENDED RELEASE ORAL at 05:28

## 2018-01-01 RX ADMIN — LIDOCAINE 1 PATCH: 4 CREAM TOPICAL at 23:00

## 2018-01-01 RX ADMIN — HEPARIN SODIUM 5000 UNIT(S): 5000 INJECTION INTRAVENOUS; SUBCUTANEOUS at 17:59

## 2018-01-01 RX ADMIN — CEFEPIME 100 MILLIGRAM(S): 1 INJECTION, POWDER, FOR SOLUTION INTRAMUSCULAR; INTRAVENOUS at 22:38

## 2018-01-01 RX ADMIN — Medication 200 MILLIGRAM(S): at 11:48

## 2018-01-01 RX ADMIN — CEFEPIME 100 MILLIGRAM(S): 1 INJECTION, POWDER, FOR SOLUTION INTRAMUSCULAR; INTRAVENOUS at 06:12

## 2018-01-01 RX ADMIN — CEFEPIME 100 MILLIGRAM(S): 1 INJECTION, POWDER, FOR SOLUTION INTRAMUSCULAR; INTRAVENOUS at 17:09

## 2018-01-01 RX ADMIN — Medication 0.25 MILLIGRAM(S): at 05:58

## 2018-01-01 RX ADMIN — LEVALBUTEROL 0.63 MILLIGRAM(S): 1.25 SOLUTION, CONCENTRATE RESPIRATORY (INHALATION) at 05:16

## 2018-01-01 RX ADMIN — Medication 20 MILLIGRAM(S): at 21:47

## 2018-01-01 RX ADMIN — Medication 1000 UNIT(S): at 05:06

## 2018-01-01 RX ADMIN — Medication 20 MILLIGRAM(S): at 21:19

## 2018-01-01 RX ADMIN — SENNA PLUS 2 TABLET(S): 8.6 TABLET ORAL at 22:20

## 2018-01-01 RX ADMIN — Medication 500 MICROGRAM(S): at 21:51

## 2018-01-01 RX ADMIN — Medication 100 MILLIGRAM(S): at 16:11

## 2018-01-01 RX ADMIN — Medication 1000 UNIT(S): at 18:40

## 2018-01-01 RX ADMIN — Medication 3 MILLILITER(S): at 13:36

## 2018-01-01 RX ADMIN — Medication 40 MILLIGRAM(S): at 09:45

## 2018-01-01 RX ADMIN — Medication 20 MILLIGRAM(S): at 21:09

## 2018-01-01 RX ADMIN — Medication 0.25 MILLIGRAM(S): at 16:15

## 2018-01-01 RX ADMIN — Medication 0.12 MILLIGRAM(S): at 03:26

## 2018-01-01 RX ADMIN — Medication 20 MILLIGRAM(S): at 12:11

## 2018-01-01 RX ADMIN — Medication 0.25 MILLIGRAM(S): at 16:11

## 2018-01-01 RX ADMIN — Medication 30 MILLIGRAM(S): at 12:26

## 2018-01-01 RX ADMIN — Medication 3 MILLILITER(S): at 05:08

## 2018-01-01 RX ADMIN — MORPHINE SULFATE 0.5 MILLIGRAM(S): 50 CAPSULE, EXTENDED RELEASE ORAL at 19:00

## 2018-01-01 RX ADMIN — HEPARIN SODIUM 5000 UNIT(S): 5000 INJECTION INTRAVENOUS; SUBCUTANEOUS at 18:41

## 2018-01-01 RX ADMIN — Medication 1000 UNIT(S): at 05:59

## 2018-01-01 RX ADMIN — Medication 0.25 MILLIGRAM(S): at 05:15

## 2018-01-01 RX ADMIN — Medication 20 MILLIGRAM(S): at 14:06

## 2018-01-01 RX ADMIN — AZITHROMYCIN 250 MILLIGRAM(S): 500 TABLET, FILM COATED ORAL at 16:04

## 2018-01-01 RX ADMIN — HEPARIN SODIUM 5000 UNIT(S): 5000 INJECTION INTRAVENOUS; SUBCUTANEOUS at 05:48

## 2018-01-01 RX ADMIN — Medication 40 MILLIGRAM(S): at 10:36

## 2018-01-01 RX ADMIN — Medication 20 MILLIGRAM(S): at 02:32

## 2018-01-01 RX ADMIN — Medication 3 MILLILITER(S): at 05:28

## 2018-01-01 RX ADMIN — Medication 500 MICROGRAM(S): at 00:16

## 2018-01-01 RX ADMIN — Medication 150 MILLIGRAM(S): at 13:59

## 2018-01-01 RX ADMIN — Medication 40 MILLIGRAM(S): at 21:54

## 2018-01-01 RX ADMIN — Medication 3 MILLILITER(S): at 05:57

## 2018-01-01 RX ADMIN — CEFEPIME 100 MILLIGRAM(S): 1 INJECTION, POWDER, FOR SOLUTION INTRAMUSCULAR; INTRAVENOUS at 05:33

## 2018-01-01 RX ADMIN — Medication 40 MILLIGRAM(S): at 13:28

## 2018-01-01 RX ADMIN — Medication 3 MILLILITER(S): at 12:26

## 2018-01-01 RX ADMIN — Medication 30 MILLIGRAM(S): at 11:41

## 2018-01-01 RX ADMIN — Medication 3 MILLILITER(S): at 17:53

## 2018-01-01 RX ADMIN — Medication 3 MILLILITER(S): at 13:28

## 2018-01-01 RX ADMIN — MORPHINE SULFATE 0.5 MILLIGRAM(S): 50 CAPSULE, EXTENDED RELEASE ORAL at 07:25

## 2018-01-01 RX ADMIN — MORPHINE SULFATE 0.5 MILLIGRAM(S): 50 CAPSULE, EXTENDED RELEASE ORAL at 06:12

## 2018-01-01 RX ADMIN — MORPHINE SULFATE 0.5 MILLIGRAM(S): 50 CAPSULE, EXTENDED RELEASE ORAL at 16:11

## 2018-01-01 RX ADMIN — CEFEPIME 100 MILLIGRAM(S): 1 INJECTION, POWDER, FOR SOLUTION INTRAMUSCULAR; INTRAVENOUS at 14:39

## 2018-01-01 RX ADMIN — Medication 0.25 MILLIGRAM(S): at 17:09

## 2018-01-01 RX ADMIN — Medication 40 MILLIGRAM(S): at 21:51

## 2018-01-01 RX ADMIN — Medication 100 MILLIGRAM(S): at 05:20

## 2018-01-01 RX ADMIN — Medication 20 MILLIGRAM(S): at 05:33

## 2018-01-01 RX ADMIN — HEPARIN SODIUM 5000 UNIT(S): 5000 INJECTION INTRAVENOUS; SUBCUTANEOUS at 06:13

## 2018-01-01 RX ADMIN — Medication 0.25 MILLIGRAM(S): at 18:38

## 2018-01-01 RX ADMIN — HALOPERIDOL DECANOATE 1 MILLIGRAM(S): 100 INJECTION INTRAMUSCULAR at 13:23

## 2018-01-01 RX ADMIN — Medication 1000 UNIT(S): at 05:57

## 2018-01-01 RX ADMIN — HEPARIN SODIUM 5000 UNIT(S): 5000 INJECTION INTRAVENOUS; SUBCUTANEOUS at 05:07

## 2018-01-01 RX ADMIN — Medication 20 MILLIGRAM(S): at 05:28

## 2018-01-01 RX ADMIN — MORPHINE SULFATE 0.5 MILLIGRAM(S): 50 CAPSULE, EXTENDED RELEASE ORAL at 21:41

## 2018-01-01 RX ADMIN — MORPHINE SULFATE 1 MILLIGRAM(S): 50 CAPSULE, EXTENDED RELEASE ORAL at 08:32

## 2018-01-01 RX ADMIN — MORPHINE SULFATE 0.5 MILLIGRAM(S): 50 CAPSULE, EXTENDED RELEASE ORAL at 21:51

## 2018-01-01 RX ADMIN — Medication 260 MILLIGRAM(S): at 00:30

## 2018-01-01 RX ADMIN — HEPARIN SODIUM 5000 UNIT(S): 5000 INJECTION INTRAVENOUS; SUBCUTANEOUS at 05:25

## 2018-01-01 RX ADMIN — Medication 20 MILLIGRAM(S): at 06:14

## 2018-01-01 RX ADMIN — Medication 600 MILLIGRAM(S): at 17:55

## 2018-01-01 RX ADMIN — SODIUM CHLORIDE 2000 MILLILITER(S): 9 INJECTION INTRAMUSCULAR; INTRAVENOUS; SUBCUTANEOUS at 18:07

## 2018-01-01 RX ADMIN — LIDOCAINE 1 PATCH: 4 CREAM TOPICAL at 09:45

## 2018-01-01 RX ADMIN — Medication 600 MILLIGRAM(S): at 17:33

## 2018-01-01 RX ADMIN — Medication 260 MILLIGRAM(S): at 23:13

## 2018-01-01 RX ADMIN — Medication 1000 UNIT(S): at 18:38

## 2018-01-01 RX ADMIN — Medication 3 MILLILITER(S): at 18:59

## 2018-01-01 RX ADMIN — Medication 1000 UNIT(S): at 05:25

## 2018-01-01 RX ADMIN — LEVALBUTEROL 0.63 MILLIGRAM(S): 1.25 SOLUTION, CONCENTRATE RESPIRATORY (INHALATION) at 23:21

## 2018-01-01 RX ADMIN — POLYETHYLENE GLYCOL 3350 17 GRAM(S): 17 POWDER, FOR SOLUTION ORAL at 12:50

## 2018-01-01 RX ADMIN — Medication 20 MILLIGRAM(S): at 06:12

## 2018-01-01 RX ADMIN — MORPHINE SULFATE 0.5 MILLIGRAM(S): 50 CAPSULE, EXTENDED RELEASE ORAL at 01:00

## 2018-01-01 RX ADMIN — Medication 0.25 MILLIGRAM(S): at 21:51

## 2018-01-01 RX ADMIN — CEFEPIME 100 MILLIGRAM(S): 1 INJECTION, POWDER, FOR SOLUTION INTRAMUSCULAR; INTRAVENOUS at 05:15

## 2018-01-01 RX ADMIN — Medication 600 MILLIGRAM(S): at 05:06

## 2018-01-01 RX ADMIN — AZITHROMYCIN 250 MILLIGRAM(S): 500 TABLET, FILM COATED ORAL at 11:56

## 2018-01-01 RX ADMIN — Medication 600 MILLIGRAM(S): at 06:29

## 2018-01-01 RX ADMIN — Medication 3 MILLILITER(S): at 06:29

## 2018-01-01 RX ADMIN — AMIODARONE HYDROCHLORIDE 600 MILLIGRAM(S): 400 TABLET ORAL at 14:47

## 2018-01-01 RX ADMIN — Medication 650 MILLIGRAM(S): at 23:50

## 2018-01-01 RX ADMIN — Medication 20 MILLIGRAM(S): at 16:00

## 2018-01-01 RX ADMIN — HEPARIN SODIUM 5000 UNIT(S): 5000 INJECTION INTRAVENOUS; SUBCUTANEOUS at 05:15

## 2018-01-01 RX ADMIN — Medication 40 MILLIGRAM(S): at 21:47

## 2018-01-01 RX ADMIN — MORPHINE SULFATE 0.5 MILLIGRAM(S): 50 CAPSULE, EXTENDED RELEASE ORAL at 13:29

## 2018-01-01 RX ADMIN — Medication 600 MILLIGRAM(S): at 18:04

## 2018-01-01 RX ADMIN — MORPHINE SULFATE 0.5 MILLIGRAM(S): 50 CAPSULE, EXTENDED RELEASE ORAL at 21:19

## 2018-01-01 RX ADMIN — Medication 20 MILLIGRAM(S): at 14:48

## 2018-01-01 RX ADMIN — Medication 1000 UNIT(S): at 05:47

## 2018-01-01 RX ADMIN — POLYETHYLENE GLYCOL 3350 17 GRAM(S): 17 POWDER, FOR SOLUTION ORAL at 11:00

## 2018-01-01 RX ADMIN — Medication 30 MILLIGRAM(S): at 12:48

## 2018-01-01 RX ADMIN — CEFEPIME 100 MILLIGRAM(S): 1 INJECTION, POWDER, FOR SOLUTION INTRAMUSCULAR; INTRAVENOUS at 21:49

## 2018-01-01 RX ADMIN — LIDOCAINE 1 PATCH: 4 CREAM TOPICAL at 17:41

## 2018-01-01 RX ADMIN — Medication 0.25 MILLIGRAM(S): at 18:00

## 2018-01-01 RX ADMIN — Medication 3 MILLILITER(S): at 18:38

## 2018-01-01 RX ADMIN — Medication 3 MILLILITER(S): at 06:07

## 2018-02-23 NOTE — ED ADULT NURSE REASSESSMENT NOTE - NS ED NURSE REASSESS COMMENT FT1
patient is sleeping in the room. remains on bipap waiting for a bed upstairs. vss/nad. will continue to monitor.

## 2018-02-23 NOTE — H&P ADULT - NSHPSOCIALHISTORY_GEN_ALL_CORE
Social History:  Diet:  (   ) Regular   ( x  ) Low Sodium   (   ) Low Cholesterol   (   ) Diabetic   (   ) Other    Marital Status:  ( x  )    (   ) Single    (   )    (  )   Occupation:   Lives with: (  ) alone  (  ) children   ( x ) spouse   (  ) parents  (  ) other    Substance Use (street drugs): ( x ) never used  (  ) other:  Tobacco Usage:  ( x  ) never smoked   (   ) former smoker   (   ) current smoker  (     ) pack years  (        ) last cigarette date  Alcohol Usage:    (     ) Advanced Directives: (   x  ) None    (      ) DNR    (     ) DNI    (     ) Health Care Proxy:

## 2018-02-23 NOTE — ED PROVIDER NOTE - PROGRESS NOTE DETAILS
ATTENDING MD Josemanuel: persistent fib, +flu. will give tamiflu, 2nd dose of dilt. Skin perfusion markedly improved with fluid as she is more warm with improved cap refill. BNP elevatedf rom prior but clinically dry and improving perfusion with IVF, do not think heart failure primary etiology. Gave doxycycline for sepsis coverage but think this is bacterial sepsis but rather primarily a viral infection. Minimally improved rate control. Work of breathing persists but improved. HR only minimally improved with dilt, receiving fluids, will c/w IVF

## 2018-02-23 NOTE — ED ADULT NURSE NOTE - OBJECTIVE STATEMENT
89 y f came to the ed c/o increased work of breathing, sob and chest congestion. patient states her aid at home gave her albuterol treatment and an expectorant at home prior to coming to the ed. patient is breathing rapidly and has an active cough. chest congestion is heard when the patient coughs. patient denies any chest pain. skin is warm and dry.

## 2018-02-23 NOTE — ED ADULT NURSE REASSESSMENT NOTE - NS ED NURSE REASSESS COMMENT FT1
md aware of vs. patient is resting in the room pending dispo. patient remains on bipap. work of breathing has improved although patient is still breathing rapidly. patient remains on cm with family at the bedside. will continue to monitor.

## 2018-02-23 NOTE — ED PROVIDER NOTE - ATTENDING CONTRIBUTION TO CARE
ATTENDING MD:  Mike MARLOW, personally have seen and examined this patient.  I have discussed all aspects of care with the resident physician. Resident note reviewed and agree on plan of care and except where noted.  See HPI, PE, and MDM for details.    elderly, frail, mild-moderate respriatory distress, AOx4. mucus membranes are dry, neck veins flat, no significant peripheral edema, heart rate tachycardic and irregular, lungs with diffuse weheezing and diminshed airflow, abd soft, notnender, skin warm and dry nor david, moving all extremities    MDM: COPD vs. CHF vs. infection. treat Afib. most likely dry by hx and exam, treat per sepsis criteria for CHRISTIANO-associated infection PRN

## 2018-02-23 NOTE — ED PROVIDER NOTE - CRITICAL CARE PROVIDED
additional history taking/documentation/interpretation of diagnostic studies/consultation with other physicians/direct patient care (not related to procedure)/consult w/ pt's family directly relating to pts condition/conducted a detailed discussion of DNR status

## 2018-02-23 NOTE — ED ADULT NURSE REASSESSMENT NOTE - NS ED NURSE REASSESS COMMENT FT1
patient is resting in the room on bipap. family is at the bedside. attempts to lower patients hr are being made. pending dispo and possible micu consult. md aware of vs. will continue to monitor.

## 2018-02-23 NOTE — H&P ADULT - HISTORY OF PRESENT ILLNESS
89yo F PMH of hypothyroidism, COPD on home O2, CHF.  p/w CC cough/SOB. Pt. states she has chronic cough but for past couple of days. mucous feels stuck", reports she tried her albuterol inhaler this AM but did not help. Denies fever chills CP n/v/d came to the emergency room with respiratory distress. Tests positive for viral influenza.  CXR negative for pneumonia and CHF, despite BNP of > 5000.EKG with rapid atrial flutter. Given IV cardizem, IV hydration and BiPap with improvement. Started on tamiflu. Remains alert and awake and able to provide a full history independently

## 2018-02-23 NOTE — H&P ADULT - ASSESSMENT
New onset of viral influenza with acute respiratory distress, dehydration, hypoxia, rapid atrial flutter.

## 2018-02-23 NOTE — ED PROVIDER NOTE - MEDICAL DECISION MAKING DETAILS
89yo f pmhx hypothyroid, copd, chf p/w CC sob/cough - persistently tachy on presentation - COPDwill start sepsis workup, likely 89yo f pmhx hypothyroid, copd, chf p/w CC sob/cough - persistently tachy on presentation - likely COPD exacerbation, will start infectious workup, bipap, cxr, light fluids and reassess.

## 2018-02-23 NOTE — H&P ADULT - NSHPREVIEWOFSYSTEMS_GEN_ALL_CORE
REVIEW OF SYSTEM:  CONSTITUTIONAL: No fever, No change in weight, + fatigue  HEAD: No headache, No dizziness, No recent trauma  EYES: No eye pain, + visual disturbances, No discharge  ENT:  No difficulty hearing, No tinnitus, No vertigo, No sinus pain, No throat pain  NECK: No pain, No stiffness  BREASTS: No pain, No masses, No nipple discharge  RESPIRATORY: +cough, No wheezing, No chills, No hemoptysis, +shortness of breath at rest or exertional shortness of breath has oxgen at home.  CARDIOVASCULAR: No chest pain, + palpitations, No dizziness, + CHF, No arrhythmia, No cardiomegaly, + leg swelling  GASTROINTESTINAL: No abdominal, No epigastric pain. No nausea, No vomiting, No hematemesis, No diarrhea, No constipation. No melena, No hematochezia. No GERD  GENITOURINARY: No dysuria, No frequency, No hematuria, No incontinence, No nocturia, No hesitancy,  SKIN: No itching, No burning, No rashes, No lesions   LYMPH NODES: No history of enlarged glands  ENDOCRINE: No heat or cold intolerance, No hair loss. No osteoporosis, + thyroid disease  MUSCULOSKELETAL: No joint pain or swelling, No muscle, back, or extremity pain  PSYCHIATRIC: + depression, + anxiety, No mood swings, + difficulty sleeping  HEME/LYMPH: No easy bruising, No anticoagulants, No bleeding disorder, No bleeding gums  ALLERGY AND IMMUNOLOGIC: No hives, No eczema  NEUROLOGICAL: No memory loss, No loss of strength, No numbness, No tremors

## 2018-02-23 NOTE — ED PROVIDER NOTE - OBJECTIVE STATEMENT
89yo F pmhx hypothyroidism, COPD on home O2, CHF p/w CC cough/SOB. Pt. states she has chronic cough but for past couple of days "the mucous feels stuck", reports she tried her albuterol inhaler this AM but did not help. Denies fever chills CP n/v/d.    Chart notes allergy to ventolin, however pt. states she takes albuterol at home. Called pts. [facility] pharmacy and they confirm an active prescription for albuterol.

## 2018-02-23 NOTE — H&P ADULT - NSHPLABSRESULTS_GEN_ALL_CORE
CARDIAC MARKERS ( 23 Feb 2018 11:10 )  x     / 0.02 ng/mL / 116 U/L / x     / 12.1 ng/mL      CBC Full  -  ( 23 Feb 2018 11:10 )  WBC Count : 7.3 K/uL  Hemoglobin : 12.4 g/dL  Hematocrit : 37.8 %  Platelet Count - Automated : 204 K/uL  Mean Cell Volume : 97.6 fl  Mean Cell Hemoglobin : 32.1 pg  Mean Cell Hemoglobin Concentration : 32.9 gm/dL  Auto Neutrophil # : 5.3 K/uL  Auto Lymphocyte # : 0.9 K/uL  Auto Monocyte # : 1.0 K/uL  Auto Eosinophil # : 0.0 K/uL  Auto Basophil # : 0.0 K/uL  Auto Neutrophil % : 72.6 %  Auto Lymphocyte % : 12.7 %  Auto Monocyte % : 14.2 %  Auto Eosinophil % : 0.2 %  Auto Basophil % : 0.3 %    02-23    138  |  96  |  34<H>  ----------------------------<  125<H>  4.5   |  24  |  0.88    Ca    9.9      23 Feb 2018 11:10    TPro  8.2  /  Alb  3.6  /  TBili  0.3  /  DBili  x   /  AST  50<H>  /  ALT  34  /  AlkPhos  108  02-23    LIVER FUNCTIONS - ( 23 Feb 2018 11:10 )  Alb: 3.6 g/dL / Pro: 8.2 g/dL / ALK PHOS: 108 U/L / ALT: 34 U/L RC / AST: 50 U/L / GGT: x           PT/INR - ( 23 Feb 2018 11:10 )   PT: 9.7 sec;   INR: 0.90 ratio         PTT - ( 23 Feb 2018 11:10 )  PTT:27.2 sec  < from: Xray Chest 1 View AP/PA (02.23.18 @ 12:16) >    INTERPRETATION:  A single chest x-ray was obtained on February 23, 2018.    Indication: Shortness of breath.    Impression:    The heart is normal in size. The lungs are clear. No radiographic   evidence for pneumonia or congestive heart failure.    < end of copied text >

## 2018-02-23 NOTE — ED PROVIDER NOTE - CARE PLAN
Principal Discharge DX:	Acute on chronic respiratory failure with hypoxia and hypercapnia  Secondary Diagnosis:	Atrial flutter with rapid ventricular response Principal Discharge DX:	Acute on chronic respiratory failure with hypoxia and hypercapnia  Secondary Diagnosis:	Atrial flutter with rapid ventricular response  Secondary Diagnosis:	Influenza

## 2018-02-23 NOTE — H&P ADULT - NSHPPHYSICALEXAM_GEN_ALL_CORE
PHYSICAL EXAM:    GENERAL: NAD, well nourished and conversant  HEAD:  Atraumatic  EYES: EOM, PERRLA, conjunctiva pink and sclera white  ENT: No tonsillar erythema, exudates, or enlargement, moist mucous membranes, good dentition, no lesions  NECK: Supple, No JVD, normal thyroid, carotids with normal upstrokes and no bruits  CHEST/LUNG: decreased breath sounds bilaterally.  HEART: irregular rate and rhythm, No murmurs, rubs, or gallops. + tachycardic  ABDOMEN: Soft, nondistended, no masses, guarding, tenderness or rebound, bowel sounds present  EXTREMITIES:  2+ Peripheral Pulses, No clubbing, cyanosis, or edema. No arthritis of shoulders, elbows, hands, hips, knees, ankles, or feet. No DJD C spine, T spine, or L/S spine  LYMPH: No lymphadenopathy noted  SKIN: No rashes or lesions  NERVOUS SYSTEM:  Alert & Oriented X3, normal cognitive function. Motor Strength 5/5 right upper and right lower.  5/5 left upper and left lower extremities, DTRs 2+ intact and symmetric

## 2018-02-23 NOTE — ED PROVIDER NOTE - PMH
Colon cancer  2000  COPD (chronic obstructive pulmonary disease)    Gait abnormality    Hypothyroidism

## 2018-02-24 NOTE — CHART NOTE - NSCHARTNOTEFT_GEN_A_CORE
Notified by the RN about O2 sat: 50s on the pulse ox. Pt was seen and examined by  the bedside. Pt is asymptomatic. Pt was on NC 3L since AM. Pt was on BiPAP overnight for hypoxia. Pt changed to BiPAP and the O2 sat improving. Pt is admitted for positive flu with respiratory distress. Pt has COPD with home O2. Pt is non verbal but alert.    MEDICATIONS  (STANDING):  cholecalciferol 1000 Unit(s) Oral two times a day  diltiazem    Tablet 30 milliGRAM(s) Oral four times a day  guaiFENesin  milliGRAM(s) Oral every 12 hours  heparin  Injectable 5000 Unit(s) SubCutaneous every 12 hours  oseltamivir 30 milliGRAM(s) Oral daily    MEDICATIONS  (PRN):    Allergies    Avelox (Urticaria; Rash)  Ventolin (Angioedema)    Intolerances      Vital Signs Last 24 Hrs  T(C): 36.8 (24 Feb 2018 12:32), Max: 36.8 (24 Feb 2018 12:32)  T(F): 98.2 (24 Feb 2018 12:32), Max: 98.2 (24 Feb 2018 12:32)  HR: 86 (24 Feb 2018 12:32) (65 - 140)  BP: 119/67 (24 Feb 2018 12:32) (108/58 - 149/74)  BP(mean): --  RR: 17 (24 Feb 2018 12:32) (17 - 25)  SpO2: 93% (24 Feb 2018 12:32) (92% - 100%)    Pt seen at bedside, lying, NAD  CV: S1, S2 RRR  Respiratory: Crackles on the LLB  Extremities: no pedal edema.     Xray Chest 1 View AP/PA (02.23.18 @ 12:16) >    	INTERPRETATION:  A single chest x-ray was obtained on February 23, 2018.    	Indication: Shortness of breath.    	Impression:    	The heart is normal in size. The lungs are clear. No radiographic   	evidence for pneumonia or congestive heart failure.  A&P:     Hypoxia  - c/w BiPAP  - continue to monitor vital signs and tele  - CXR portable   - D/c IVF  - Lasix IVP 40mg x 1   Notified Dr. Emanuel and agrees with the above plan. Will continue to monitor the patient Notified by the RN about O2 sat: 50s on the pulse ox. Pt was seen and examined by  the bedside. Pt is asymptomatic. Pt was on NC 3L since AM. Pt was on BiPAP overnight for hypoxia. Pt changed to BiPAP and repositioned and the O2 sat improving. Pt is admitted for positive flu with respiratory distress. Pt has COPD with home O2. Pt is non verbal but alert.    MEDICATIONS  (STANDING):  cholecalciferol 1000 Unit(s) Oral two times a day  diltiazem    Tablet 30 milliGRAM(s) Oral four times a day  guaiFENesin  milliGRAM(s) Oral every 12 hours  heparin  Injectable 5000 Unit(s) SubCutaneous every 12 hours  oseltamivir 30 milliGRAM(s) Oral daily    MEDICATIONS  (PRN):    Allergies    Avelox (Urticaria; Rash)  Ventolin (Angioedema)    Intolerances      Vital Signs Last 24 Hrs  T(C): 36.8 (24 Feb 2018 12:32), Max: 36.8 (24 Feb 2018 12:32)  T(F): 98.2 (24 Feb 2018 12:32), Max: 98.2 (24 Feb 2018 12:32)  HR: 86 (24 Feb 2018 12:32) (65 - 140)  BP: 119/67 (24 Feb 2018 12:32) (108/58 - 149/74)  BP(mean): --  RR: 17 (24 Feb 2018 12:32) (17 - 25)  SpO2: 93% (24 Feb 2018 12:32) (92% - 100%)    Pt seen at bedside, lying, NAD  CV: S1, S2 RRR  Respiratory: Crackles on the LLB  Extremities: no pedal edema.     Xray Chest 1 View AP/PA (02.23.18 @ 12:16) >    	INTERPRETATION:  A single chest x-ray was obtained on February 23, 2018.    	Indication: Shortness of breath.    	Impression:    	The heart is normal in size. The lungs are clear. No radiographic   	evidence for pneumonia or congestive heart failure.  A&P:  89 year old female with PMHx of COPD on home O2, CHF admitted w/ viral influenza with acute respiratory distress, dehydration, hypoxia, rapid atrial flutter now presenting with hypoxia    Hypoxia   - c/w BiPAP  - continue to monitor vital signs and tele  - CXR portable   - D/c IVF  - Lasix IVP 40mg x 1   Notified Dr. Emanuel and agrees with the above plan. Will continue to monitor the patient    Aidee GRIFFITH  # [id number] Notified by the RN about O2 sat: 50s on the pulse ox. Pt was seen and examined by  the bedside. Pt is asymptomatic. Pt was on NC 3L since AM. Pt was on BiPAP overnight for hypoxia. Pt changed to BiPAP and repositioned and the O2 sat improving to 93%. Pt is admitted for positive flu with respiratory distress. Pt has COPD with home O2. Pt is non verbal but alert.    MEDICATIONS  (STANDING):  cholecalciferol 1000 Unit(s) Oral two times a day  diltiazem    Tablet 30 milliGRAM(s) Oral four times a day  guaiFENesin  milliGRAM(s) Oral every 12 hours  heparin  Injectable 5000 Unit(s) SubCutaneous every 12 hours  oseltamivir 30 milliGRAM(s) Oral daily    MEDICATIONS  (PRN):    Allergies    Avelox (Urticaria; Rash)  Ventolin (Angioedema)    Intolerances      Vital Signs Last 24 Hrs  T(C): 36.8 (24 Feb 2018 12:32), Max: 36.8 (24 Feb 2018 12:32)  T(F): 98.2 (24 Feb 2018 12:32), Max: 98.2 (24 Feb 2018 12:32)  HR: 86 (24 Feb 2018 12:32) (65 - 140)  BP: 119/67 (24 Feb 2018 12:32) (108/58 - 149/74)  BP(mean): --  RR: 17 (24 Feb 2018 12:32) (17 - 25)  SpO2: 93% (24 Feb 2018 12:32) (92% - 100%)    Pt seen at bedside, lying, NAD  CV: S1, S2 RRR  Respiratory: Crackles on the LLB  Extremities: no pedal edema.     Xray Chest 1 View AP/PA (02.23.18 @ 12:16) >    	INTERPRETATION:  A single chest x-ray was obtained on February 23, 2018.    	Indication: Shortness of breath.    	Impression:    	The heart is normal in size. The lungs are clear. No radiographic   	evidence for pneumonia or congestive heart failure.  A&P:  89 year old female with PMHx of COPD on home O2, CHF admitted w/ viral influenza with acute respiratory distress, dehydration, hypoxia, rapid atrial flutter now presenting with hypoxia    Hypoxia   - c/w BiPAP  - continue to monitor vital signs and tele  - CXR portable   - D/c IVF  - Lasix IVP 40mg x 1   Notified Dr. Emanuel and agrees with the above plan. Will continue to monitor the patient    Aidee GRIFFITH  # [id number]

## 2018-02-24 NOTE — PROVIDER CONTACT NOTE (CRITICAL VALUE NOTIFICATION) - ASSESSMENT
Patient placed on BIPAP this evening at 1945.Patient is Alert,oriented x3,mentating well,asnwering questions appropriately.

## 2018-02-24 NOTE — PATIENT PROFILE ADULT. - INFORMATION COULD NOT BE OBTAINED DETAILS
Patient has no children, has dementia lives with caretaker,as per patient-,MD Mills and Katharina the caretaker are the people she trusts.

## 2018-02-24 NOTE — CHART NOTE - NSCHARTNOTEFT_GEN_A_CORE
Notified by RN about PAT for 2 secs with HR in 140 at 7:40 am seen on tele. Pt seen and examined at bedside. Pt was asymptomatic at the time of the event. Pt is in NSR 70s - 80s. Pt is admitted for flu positive with hypoxia. Pt was found to have aflutter and was given diltiazem. Pt is on diltiazem 40 QID.Pt denies CP, SOB, WOODARD,  palpitations, headache.     MEDICATIONS  (STANDING):  cholecalciferol 1000 Unit(s) Oral two times a day  diltiazem    Tablet 30 milliGRAM(s) Oral four times a day  guaiFENesin  milliGRAM(s) Oral every 12 hours  heparin  Injectable 5000 Unit(s) SubCutaneous every 12 hours  oseltamivir 30 milliGRAM(s) Oral daily    MEDICATIONS  (PRN):    Allergies    Avelox (Urticaria; Rash)  Ventolin (Angioedema)    Intolerances      Vital Signs Last 24 Hrs  T(C): 36.8 (24 Feb 2018 12:32), Max: 36.8 (24 Feb 2018 12:32)  T(F): 98.2 (24 Feb 2018 12:32), Max: 98.2 (24 Feb 2018 12:32)  HR: 86 (24 Feb 2018 12:32) (65 - 140)  BP: 119/67 (24 Feb 2018 12:32) (108/58 - 149/74)  BP(mean): --  RR: 17 (24 Feb 2018 12:32) (17 - 25)  SpO2: 93% (24 Feb 2018 12:32) (92% - 100%)    Physical: Pt sitting up, NAD                          10.3   4.6   )-----------( 91       ( 24 Feb 2018 06:24 )             33.1   02-24    140  |  102  |  38<H>  ----------------------------<  114<H>  4.4   |  22  |  0.93    Ca    8.9      24 Feb 2018 07:41    TPro  8.2  /  Alb  3.6  /  TBili  0.3  /  DBili  x   /  AST  50<H>  /  ALT  34  /  AlkPhos  108  02-23      A&P  89 year old female with PMHX of COPD on home O2 CHF new onset of viral influenza with acute respiratory distress, dehydration, hypoxia, rapid atrial flutter    1. PAT 2 sec with HR 140s  - c/w tele monitor   - c/w vital signs  - monitor lytes  - c/w diltiazem 40 QID   - will continue to monitor    Aidee GRIFFITH  #[id number] Notified by RN about PAT for 2 secs with HR in 140 at 7:40 am seen on tele. Pt seen and examined at bedside. Pt was asymptomatic at the time of the event. Pt is in NSR 70s - 80s. Pt is admitted for flu positive with hypoxia. Pt was found to have aflutter and was given diltiazem. Pt is on diltiazem 40 QID which of held last night due to sinus bradycardia. Pt denies CP, SOB, WOODARD,  palpitations, headache.     MEDICATIONS  (STANDING):  cholecalciferol 1000 Unit(s) Oral two times a day  diltiazem    Tablet 30 milliGRAM(s) Oral four times a day  guaiFENesin  milliGRAM(s) Oral every 12 hours  heparin  Injectable 5000 Unit(s) SubCutaneous every 12 hours  oseltamivir 30 milliGRAM(s) Oral daily    MEDICATIONS  (PRN):    Allergies    Avelox (Urticaria; Rash)  Ventolin (Angioedema)    Intolerances      Vital Signs Last 24 Hrs  T(C): 36.8 (24 Feb 2018 12:32), Max: 36.8 (24 Feb 2018 12:32)  T(F): 98.2 (24 Feb 2018 12:32), Max: 98.2 (24 Feb 2018 12:32)  HR: 86 (24 Feb 2018 12:32) (65 - 140)  BP: 119/67 (24 Feb 2018 12:32) (108/58 - 149/74)  BP(mean): --  RR: 17 (24 Feb 2018 12:32) (17 - 25)  SpO2: 93% (24 Feb 2018 12:32) (92% - 100%)    Physical: Pt sitting up, NAD                          10.3   4.6   )-----------( 91       ( 24 Feb 2018 06:24 )             33.1   02-24    140  |  102  |  38<H>  ----------------------------<  114<H>  4.4   |  22  |  0.93    Ca    8.9      24 Feb 2018 07:41    TPro  8.2  /  Alb  3.6  /  TBili  0.3  /  DBili  x   /  AST  50<H>  /  ALT  34  /  AlkPhos  108  02-23      A&P  89 year old female with PMHX of COPD on home O2 CHF new onset of viral influenza with acute respiratory distress, dehydration, hypoxia, rapid atrial flutter    1. PAT 2 sec with HR 140s  - c/w tele monitor   - c/w vital signs  - monitor lytes  - c/w diltiazem 40 QID   - will continue to monitor    Aidee GRIFFITH  #[id number]

## 2018-02-24 NOTE — PROGRESS NOTE ADULT - SUBJECTIVE AND OBJECTIVE BOX
89yo F PMH of hypothyroidism, COPD on home O2, CHF.  p/w CC cough/SOB. Pt. states she has chronic cough but for past couple of days. mucous feels stuck", reports she tried her albuterol inhaler this AM but did not help. Denies fever chills CP n/v/d came to the emergency room with respiratory distress. Tests positive for viral influenza.  CXR negative for pneumonia and CHF, despite BNP of > 5000.EKG with rapid atrial flutter. Given IV cardizem, IV hydration and BiPap with improvement. Patient being treated for influenza. today with decreased PO intake and a drop in O2 sat requiring further treatment with Bipap    MEDICATIONS  (STANDING):  cholecalciferol 1000 Unit(s) Oral two times a day  diltiazem    Tablet 30 milliGRAM(s) Oral four times a day  guaiFENesin  milliGRAM(s) Oral every 12 hours  heparin  Injectable 5000 Unit(s) SubCutaneous every 12 hours  oseltamivir 30 milliGRAM(s) Oral daily    MEDICATIONS  (PRN):          VITALS:   T(C): 36.8 (18 @ 12:32), Max: 36.8 (18 @ 12:32)  HR: 86 (18 @ 12:32) (65 - 92)  BP: 119/67 (-24-18 @ 12:32) (109/48 - 149/74)  RR: 17 (18 @ 12:32) (17 - 20)  SpO2: 93% (18 @ 12:32) (92% - 100%)  Wt(kg): --    PHYSICAL EXAM:    	GENERAL: NAD, well nourished and conversant  	HEAD:  Atraumatic  	EYES: EOM, PERRLA, conjunctiva pink and sclera white  	ENT: No tonsillar erythema, exudates, or enlargement, moist mucous membranes, good dentition, no lesions  	NECK: Supple, No JVD, normal thyroid, carotids with normal upstrokes and no bruits  	CHEST/LUNG: decreased breath sounds bilaterally.  	HEART: irregular rate and rhythm, No murmurs, rubs, or gallops. + tachycardic  	ABDOMEN: Soft, nondistended, no masses, guarding, tenderness or rebound, bowel sounds present  	EXTREMITIES:  2+ Peripheral Pulses, No clubbing, cyanosis, or edema. No arthritis of shoulders, elbows, hands, hips, knees, ankles, or feet. No DJD C spine, T spine, or L/S spine  	LYMPH: No lymphadenopathy noted  	SKIN: No rashes or lesions  NERVOUS SYSTEM:lethargic,    LABS:  ABG - ( 2018 06:29 )  pH: 7.27  /  pCO2: 59    /  pO2: 235   / HCO3: 26    / Base Excess: -.7   /  SaO2: 100               CARDIAC MARKERS ( 2018 07:41 )  x     / 0.01 ng/mL / 101 U/L / x     / 11.1 ng/mL  CARDIAC MARKERS ( 2018 00:27 )  x     / 0.02 ng/mL / 108 U/L / x     / 12.9 ng/mL  CARDIAC MARKERS ( 2018 11:10 )  x     / 0.02 ng/mL / 116 U/L / x     / 12.1 ng/mL      CBC Full  -  ( 2018 06:24 )  WBC Count : 4.6 K/uL  Hemoglobin : 10.3 g/dL  Hematocrit : 33.1 %  Platelet Count - Automated : 91 K/uL  Mean Cell Volume : 99.0 fl  Mean Cell Hemoglobin : 30.8 pg  Mean Cell Hemoglobin Concentration : 31.1 gm/dL  Auto Neutrophil # : x  Auto Lymphocyte # : x  Auto Monocyte # : x  Auto Eosinophil # : x  Auto Basophil # : x  Auto Neutrophil % : x  Auto Lymphocyte % : x  Auto Monocyte % : x  Auto Eosinophil % : x  Auto Basophil % : x    02-24    140  |  102  |  38<H>  ----------------------------<  114<H>  4.4   |  22  |  0.93    Ca    8.9      2018 07:41  Phos  4.9     02-24  Mg     1.9     02-24    TPro  8.2  /  Alb  3.6  /  TBili  0.3  /  DBili  x   /  AST  50<H>  /  ALT  34  /  AlkPhos  108  02-23    LIVER FUNCTIONS - ( 2018 11:10 )  Alb: 3.6 g/dL / Pro: 8.2 g/dL / ALK PHOS: 108 U/L / ALT: 34 U/L RC / AST: 50 U/L / GGT: x           PT/INR - ( 2018 11:10 )   PT: 9.7 sec;   INR: 0.90 ratio         PTT - ( 2018 11:10 )  PTT:27.2 sec  Urinalysis Basic - ( 2018 07:58 )    Color: Yellow / Appearance: Clear / S.023 / pH: x  Gluc: x / Ketone: Negative  / Bili: Negative / Urobili: Negative   Blood: x / Protein: 30 mg/dL / Nitrite: Negative   Leuk Esterase: Negative / RBC: 0-2 /HPF / WBC x   Sq Epi: x / Non Sq Epi: x / Bacteria: x      CAPILLARY BLOOD GLUCOSE          RADIOLOGY & ADDITIONAL TESTS:

## 2018-02-24 NOTE — PROVIDER CONTACT NOTE (OTHER) - ASSESSMENT
Patient fast asleep on BIPAP.heart rate upto the 50's -sinus jamie on tele.Vitals BP-109/48,heart rate 65/mt,O 2 sat 100% on BIPAP,Temp-97.2F.

## 2018-02-25 NOTE — PROVIDER CONTACT NOTE (OTHER) - ACTION/TREATMENT ORDERED:
NP at bedside assessing the patient,Duoneb x3 doses 15 minutes apart administered,Tylenol 650 mg IV x1 dose administered for leg pain.Patient resting comfortably.DUONEB q6h ordered.

## 2018-02-25 NOTE — PROVIDER CONTACT NOTE (CRITICAL VALUE NOTIFICATION) - ASSESSMENT
Patient Alert,oriented x3,mentating well ,BIPAP with changed settings.No respiratory distress noted.Patient c/o BIPAP not being the same after the settings were changed.

## 2018-02-25 NOTE — PROGRESS NOTE ADULT - PROBLEM SELECTOR PLAN 1
continue Bipap will try to wean off bipap and follow CO2  follow O2 sats  chest physical therapy as needed

## 2018-02-25 NOTE — CHART NOTE - NSCHARTNOTEFT_GEN_A_CORE
NP Note (episodic)     Called by RN because pt c/o SOB on bipap. PT states "I can't breathe I cant breathe." Pt seen and examined at bedside. Pt frail elderly on bipap Tachypneic RR 40s. Denies CP, dizziness, abdominal  pain, N/V/D, numbness/tingling, extremity weakness, dysuria.     ROS: see HPI     Physical Exam:    Vital Signs Last 24 Hrs  T(C): 36.4 (25 Feb 2018 01:08), Max: 36.8 (24 Feb 2018 12:32)  T(F): 97.6 (25 Feb 2018 01:08), Max: 98.2 (24 Feb 2018 12:32)  HR: 75 (25 Feb 2018 02:25) (65 - 94)  BP: 118/60 (25 Feb 2018 02:25) (109/48 - 149/74)  BP(mean): --  RR: 20 (25 Feb 2018 02:25) (17 - 20)  SpO2: 93% (25 Feb 2018 02:25) (92% - 100%)    General: NAD, AOx3, frail cachectic elderly, tachypneic   Head:  NC/AT, no scleral injection, no JVD   CV: RRR, S1S2   Respiratory: CTA B/L - diminished BL at bases, tachypneic in 40s , no ronchi or wheeze appreciable   Abdominal: Soft, NT, ND no palpable mass, no guarding or rebound tenderness  MSK: No BLLE edema, + peripheral pulses, FROM all 4 extremity, kyphosis       Labs:                          10.3   4.6   )-----------( 91       ( 24 Feb 2018 06:24 )             33.1     02-24    140  |  102  |  38<H>  ----------------------------<  114<H>  4.4   |  22  |  0.93    Ca    8.9      24 Feb 2018 07:41  Phos  4.9     02-24  Mg     1.9     02-24    TPro  8.2  /  Alb  3.6  /  TBili  0.3  /  DBili  x   /  AST  50<H>  /  ALT  34  /  AlkPhos  108  02-23    ABG - ( 25 Feb 2018 02:03 )  pH: 7.25  /  pCO2: 73    /  pO2: 84    / HCO3: 31    / Base Excess: 2.9   /  SaO2: 94                Urinalysis Basic - ( 02-24 @ 07:58 )    Color: -- / Appearance: Negative / SG: -- / pH: Negative  Gluc: Negative / Ketone: Yellow  / Bili: -- / Urobili: 2-5   Blood: -- / Protein: -- / Nitrite: Negative   Leuk Esterase: Negative / RBC: 1.023 / WBC --   Sq Epi: 30 / Non Sq Epi: 0-2 / Bacteria: 5.5        Radiology:    CXR 2/24   prelim- no acute findings       Assessment & Plan:    89yo F PMH of hypothyroidism, COPD on home O2, CHF.  p/w CC cough/SOB. Pt. states she has chronic cough but for past couple of days. mucous feels stuck", reports she tried her albuterol inhaler this AM but did not help. Denies fever chills CP n/v/d came to the emergency room with respiratory distress. Tests positive for viral influenza. Pt  admitted for viral influenza with acute respiratory distress, dehydration, hypoxia, rapid atrial flutter now p/w tachypnea and SOB on bipap. Pt appears on the dry side, likely COPD exacerbation. There may be an underlying anxiety component     -duoneb x 1 dose (pt has ventolin allergies, angioedema, but has received duonebs during her hospitalization) - Pt appears to be symptomatically improved, RR reduced 20s and resting comfortably.   However, when awake, RR returned to the 30s although pt states she is breathing more comfortably.  duoneb x 2 more doses ordered  -standing duoneb q 6 ordered   -pt may benefit from ICS   -Bipap adjusted 13/5 FiO2 variable (currently at 35%) for target SpO2 90-94%   -ABG f/u slight improvement at 1 am, will f/w next ABG at 8 am for pCO2 retention and respiratory acidosis   - Will continue to monitor closely     Will endorse to Primary Team in AM[place]    [place] [id number] NP Note (episodic)     Called by RN because pt c/o SOB on bipap. PT states "I can't breathe I cant breathe." Pt seen and examined at bedside. Pt frail elderly on bipap 13/5 FiO2 35% Tachypneic RR 40s, pt never hypoxic. Denies CP, dizziness, abdominal  pain, N/V/D, numbness/tingling, extremity weakness, dysuria.     ROS: see HPI     Physical Exam:    Vital Signs Last 24 Hrs  T(C): 36.4 (25 Feb 2018 01:08), Max: 36.8 (24 Feb 2018 12:32)  T(F): 97.6 (25 Feb 2018 01:08), Max: 98.2 (24 Feb 2018 12:32)  HR: 75 (25 Feb 2018 02:25) (65 - 94)  BP: 118/60 (25 Feb 2018 02:25) (109/48 - 149/74)  BP(mean): --  RR: 20 (25 Feb 2018 02:25) (17 - 20)  SpO2: 93% (25 Feb 2018 02:25) (92% - 100%)    General: NAD, AOx3, frail cachectic elderly, tachypneic   Head:  NC/AT, no scleral injection, no JVD   CV: RRR, S1S2   Respiratory: CTA B/L - diminished BL at bases, tachypneic in 40s , no ronchi or wheeze appreciable   Abdominal: Soft, NT, ND no palpable mass, no guarding or rebound tenderness  MSK: No BLLE edema, + peripheral pulses, FROM all 4 extremity, kyphosis       Labs:                          10.3   4.6   )-----------( 91       ( 24 Feb 2018 06:24 )             33.1     02-24    140  |  102  |  38<H>  ----------------------------<  114<H>  4.4   |  22  |  0.93    Ca    8.9      24 Feb 2018 07:41  Phos  4.9     02-24  Mg     1.9     02-24    TPro  8.2  /  Alb  3.6  /  TBili  0.3  /  DBili  x   /  AST  50<H>  /  ALT  34  /  AlkPhos  108  02-23    ABG - ( 25 Feb 2018 02:03 )  pH: 7.25  /  pCO2: 73    /  pO2: 84    / HCO3: 31    / Base Excess: 2.9   /  SaO2: 94                Urinalysis Basic - ( 02-24 @ 07:58 )    Color: -- / Appearance: Negative / SG: -- / pH: Negative  Gluc: Negative / Ketone: Yellow  / Bili: -- / Urobili: 2-5   Blood: -- / Protein: -- / Nitrite: Negative   Leuk Esterase: Negative / RBC: 1.023 / WBC --   Sq Epi: 30 / Non Sq Epi: 0-2 / Bacteria: 5.5        Radiology:    CXR 2/24   prelim- no acute findings       Assessment & Plan:    89yo F PMH of hypothyroidism, COPD on home O2, CHF.  p/w CC cough/SOB. Pt. states she has chronic cough but for past couple of days. mucous feels stuck", reports she tried her albuterol inhaler this AM but did not help. Denies fever chills CP n/v/d came to the emergency room with respiratory distress. Tests positive for viral influenza. Pt  admitted for viral influenza with acute respiratory distress, dehydration, hypoxia, rapid atrial flutter now p/w tachypnea and SOB on bipap. Pt appears on the dry side, likely COPD exacerbation. There may be an underlying anxiety component     -duoneb x 1 dose (pt has ventolin allergies, angioedema, but has received duonebs during her hospitalization) - Pt appears to be symptomatically improved, RR reduced 20s and resting comfortably.   However, when awake, RR returned to the 30s although pt states she is breathing more comfortably.  duoneb x 2 more doses ordered  -standing duoneb q 6 ordered   -pt may benefit from ICS   -Bipap adjusted 13/5 FiO2 variable (currently at 35%) for target SpO2 90-94%   -ABG f/u slight improvement at 1 am, will f/w next ABG at 8 am for pCO2 retention and respiratory acidosis   - Will continue to monitor closely     Will endorse to Primary Team in AM[place]    [place] [id number] NP Note (episodic)     Called by RN because pt c/o SOB on bipap. PT states "I can't breathe I cant breathe." Pt seen and examined at bedside. Pt frail elderly on bipap 13/5 FiO2 35% Tachypneic RR 40s, pt never hypoxic. Denies CP, dizziness, abdominal  pain, N/V/D, numbness/tingling, extremity weakness, dysuria.     ROS: see HPI     Physical Exam:    Vital Signs Last 24 Hrs  T(C): 36.4 (25 Feb 2018 01:08), Max: 36.8 (24 Feb 2018 12:32)  T(F): 97.6 (25 Feb 2018 01:08), Max: 98.2 (24 Feb 2018 12:32)  HR: 75 (25 Feb 2018 02:25) (65 - 94)  BP: 118/60 (25 Feb 2018 02:25) (109/48 - 149/74)  BP(mean): --  RR: 20 (25 Feb 2018 02:25) (17 - 20)  SpO2: 93% (25 Feb 2018 02:25) (92% - 100%)    General: NAD, AOx3, frail cachectic elderly, tachypneic   Head:  NC/AT, no scleral injection, no JVD   CV: RRR, S1S2   Respiratory: CTA B/L - diminished BL at bases, tachypneic in 40s , no ronchi or wheeze appreciable   Abdominal: Soft, NT, ND no palpable mass, no guarding or rebound tenderness  MSK: No BLLE edema, + peripheral pulses, FROM all 4 extremity, kyphosis       Labs:                          10.3   4.6   )-----------( 91       ( 24 Feb 2018 06:24 )             33.1     02-24    140  |  102  |  38<H>  ----------------------------<  114<H>  4.4   |  22  |  0.93    Ca    8.9      24 Feb 2018 07:41  Phos  4.9     02-24  Mg     1.9     02-24    TPro  8.2  /  Alb  3.6  /  TBili  0.3  /  DBili  x   /  AST  50<H>  /  ALT  34  /  AlkPhos  108  02-23    ABG - ( 25 Feb 2018 02:03 )  pH: 7.25  /  pCO2: 73    /  pO2: 84    / HCO3: 31    / Base Excess: 2.9   /  SaO2: 94                Urinalysis Basic - ( 02-24 @ 07:58 )    Color: -- / Appearance: Negative / SG: -- / pH: Negative  Gluc: Negative / Ketone: Yellow  / Bili: -- / Urobili: 2-5   Blood: -- / Protein: -- / Nitrite: Negative   Leuk Esterase: Negative / RBC: 1.023 / WBC --   Sq Epi: 30 / Non Sq Epi: 0-2 / Bacteria: 5.5        Radiology:    CXR 2/24   prelim- no acute findings       Assessment & Plan:    89yo F PMH of hypothyroidism, COPD on home O2, CHF.  p/w CC cough/SOB. Pt. states she has chronic cough but for past couple of days. mucous feels stuck", reports she tried her albuterol inhaler this AM but did not help. Denies fever chills CP n/v/d came to the emergency room with respiratory distress. Tests positive for viral influenza. Pt  admitted for viral influenza with acute respiratory distress, dehydration, hypoxia, rapid atrial flutter now p/w tachypnea and SOB on bipap. Pt appears on the dry side, likely COPD exacerbation. There may be an underlying anxiety component     -duoneb x 1 dose (pt has ventolin allergies, angioedema, but has received duonebs during her hospitalization) - Pt appears to be symptomatically improved, RR reduced 20s and resting comfortably.   However, when awake, RR returned to the 30s although pt states she is breathing more comfortably.  duoneb x 2 more doses ordered  -standing duoneb q 6 ordered   -pt may benefit from ICS, Pulm recs appreciated   -Bipap adjusted 13/5 FiO2 variable (currently at 35%) for target SpO2 90-94%   -ABG f/u slight improvement at 1 am, will f/w next ABG at 8 am for pCO2 retention and respiratory acidosis   - Will continue to monitor closely     Will endorse to Primary Team in AM.    Madhavi Beck [place] [id number]

## 2018-02-25 NOTE — PROGRESS NOTE ADULT - PROBLEM SELECTOR PLAN 2
30 mg daily will finish course 2/28  turner IV hydration as needed  will use  cough suppressants as needed

## 2018-02-25 NOTE — PROGRESS NOTE ADULT - SUBJECTIVE AND OBJECTIVE BOX
89yo F PMH of hypothyroidism, COPD on home O2, CHF.  p/w CC cough/SOB. Pt. states she has chronic cough but for past couple of days. mucous feels stuck", reports she tried her albuterol inhaler this AM but did not help. Denies fever chills CP n/v/d came to the emergency room with respiratory distress. Tests positive for viral influenza.  CXR negative for pneumonia and CHF, despite BNP of > 5000.EKG with rapid atrial flutter. Given IV cardizem, IV hydration and BiPap with improvement. Patient being treated for influenza. today resting comfortably. decreased PO intake    MEDICATIONS  (STANDING):  ALBUTerol/ipratropium for Nebulization 3 milliLiter(s) Nebulizer every 6 hours  cholecalciferol 1000 Unit(s) Oral two times a day  diltiazem    Tablet 30 milliGRAM(s) Oral four times a day  guaiFENesin  milliGRAM(s) Oral every 12 hours  heparin  Injectable 5000 Unit(s) SubCutaneous every 12 hours  oseltamivir 30 milliGRAM(s) Oral daily    MEDICATIONS  (PRN):          VITALS:   T(C): 36.7 (18 @ 21:09), Max: 36.7 (18 @ 21:09)  HR: 83 (18 @ 21:09) (71 - 94)  BP: 120/55 (18 @ 21:09) (111/59 - 130/61)  RR: 20 (18 @ 21:09) (17 - 21)  SpO2: 96% (18 @ 21:09) (93% - 98%)  Wt(kg): --    PHYSICAL EXAM:    	GENERAL: NAD, well nourished and conversant  	HEAD:  Atraumatic  	EYES: EOM, PERRLA, conjunctiva pink and sclera white  	ENT: No tonsillar erythema, exudates, or enlargement, moist mucous membranes, good dentition, no lesions  	NECK: Supple, No JVD, normal thyroid, carotids with normal upstrokes and no bruits  	CHEST/LUNG: decreased breath sounds bilaterally.  	HEART: irregular rate and rhythm, No murmurs, rubs, or gallops. + tachycardic  	ABDOMEN: Soft, nondistended, no masses, guarding, tenderness or rebound, bowel sounds present  	EXTREMITIES:  2+ Peripheral Pulses, No clubbing, cyanosis, or edema. No arthritis of shoulders, elbows, hands, hips, knees, ankles, or feet. No DJD C spine, T spine, or L/S spine  	LYMPH: No lymphadenopathy noted  	SKIN: No rashes or lesions  NERVOUS SYSTEM: lethargic but arousable     LABS:  ABG - ( 2018 09:08 )  pH: 7.33  /  pCO2: 58    /  pO2: 94    / HCO3: 30    / Base Excess: 3.6   /  SaO2: 97                CARDIAC MARKERS ( 2018 07:41 )  x     / 0.01 ng/mL / 101 U/L / x     / 11.1 ng/mL  CARDIAC MARKERS ( 2018 00:27 )  x     / 0.02 ng/mL / 108 U/L / x     / 12.9 ng/mL      CBC Full  -  ( 2018 07:38 )  WBC Count : 10.6 K/uL  Hemoglobin : 10.5 g/dL  Hematocrit : 32.6 %  Platelet Count - Automated : 176 K/uL  Mean Cell Volume : 98.7 fl  Mean Cell Hemoglobin : 31.8 pg  Mean Cell Hemoglobin Concentration : 32.2 gm/dL  Auto Neutrophil # : x  Auto Lymphocyte # : x  Auto Monocyte # : x  Auto Eosinophil # : x  Auto Basophil # : x  Auto Neutrophil % : x  Auto Lymphocyte % : x  Auto Monocyte % : x  Auto Eosinophil % : x  Auto Basophil % : x        143  |  102  |  50<H>  ----------------------------<  87  3.8   |  30  |  1.13    Ca    9.3      2018 07:38  Phos  4.9     02-24  Mg     1.9     02-25          Urinalysis Basic - ( 2018 07:58 )    Color: Yellow / Appearance: Clear / S.023 / pH: x  Gluc: x / Ketone: Negative  / Bili: Negative / Urobili: Negative   Blood: x / Protein: 30 mg/dL / Nitrite: Negative   Leuk Esterase: Negative / RBC: 0-2 /HPF / WBC x   Sq Epi: x / Non Sq Epi: x / Bacteria: x

## 2018-02-25 NOTE — PROVIDER CONTACT NOTE (OTHER) - ASSESSMENT
Patient alert,oriented x3,mentating well,BIPAP assisted.BIPAP settings changed as per ABG results.Patient states she can't breathe.Vitals BP-118/60,heart rate 75/mt,0 2 sat 93% on BIPAP,Respiratory rate 38/mt.

## 2018-02-26 NOTE — PROGRESS NOTE ADULT - PROBLEM SELECTOR PLAN 2
30 mg daily will finish course 2/28  turner IV hydration as needed  will use  cough suppressants as needed  encourage participation with physical therapy

## 2018-02-26 NOTE — PROGRESS NOTE ADULT - SUBJECTIVE AND OBJECTIVE BOX
89yo F PMH of hypothyroidism, COPD on home O2, CHF.  p/w CC cough/SOB. Pt. states she has chronic cough but for past couple of days. mucous feels stuck", reports she tried her albuterol inhaler this AM but did not help. Denies fever chills CP n/v/d came to the emergency room with respiratory distress. Tests positive for viral influenza.  CXR negative for pneumonia and CHF, despite BNP of > 5000.EKG with rapid atrial flutter. Given IV cardizem, IV hydration and BiPap with improvement. Patient being treated for influenza. today continued complaint of back pain currently off Bipap   MEDICATIONS  (STANDING):  ALBUTerol/ipratropium for Nebulization 3 milliLiter(s) Nebulizer every 6 hours  cholecalciferol 1000 Unit(s) Oral two times a day  diltiazem    Tablet 30 milliGRAM(s) Oral four times a day  guaiFENesin  milliGRAM(s) Oral every 12 hours  heparin  Injectable 5000 Unit(s) SubCutaneous every 12 hours  oseltamivir 30 milliGRAM(s) Oral daily    MEDICATIONS  (PRN):  guaiFENesin    Syrup 200 milliGRAM(s) Oral every 6 hours PRN Cough  sodium chloride 0.65% Nasal 1 Spray(s) Both Nostrils four times a day PRN Nasal Congestion          VITALS:   T(C): 36.9 (02-26-18 @ 12:23), Max: 36.9 (02-26-18 @ 12:23)  HR: 98 (02-26-18 @ 12:23) (83 - 107)  BP: 146/73 (02-26-18 @ 12:23) (118/69 - 146/73)  RR: 18 (02-26-18 @ 12:23) (18 - 20)  SpO2: 92% (02-26-18 @ 12:23) (91% - 98%)  Wt(kg): --    PHYSICAL EXAM:    	GENERAL: NAD, well nourished and conversant  	HEAD:  Atraumatic  	EYES: EOM, PERRLA, conjunctiva pink and sclera white  	ENT: No tonsillar erythema, exudates, or enlargement, moist mucous membranes, good dentition, no lesions  	NECK: Supple, No JVD, normal thyroid, carotids with normal upstrokes and no bruits  	CHEST/LUNG: decreased breath sounds bilaterally.  	HEART: irregular rate and rhythm, No murmurs, rubs, or gallops. + tachycardic  	ABDOMEN: Soft, nondistended, no masses, guarding, tenderness or rebound, bowel sounds present  	EXTREMITIES:  2+ Peripheral Pulses, No clubbing, cyanosis, or edema. No arthritis of shoulders, elbows, hands, hips, knees, ankles, or feet. No DJD C spine, T spine, or L/S spine  	LYMPH: No lymphadenopathy noted  	SKIN: No rashes or lesions  NERVOUS SYSTEM: lethargic, arousable    LABS:  ABG - ( 26 Feb 2018 09:54 )  pH: 7.36  /  pCO2: 58    /  pO2: 74    / HCO3: 32    / Base Excess: 5.9   /  SaO2: 94                    CBC Full  -  ( 25 Feb 2018 07:38 )  WBC Count : 10.6 K/uL  Hemoglobin : 10.5 g/dL  Hematocrit : 32.6 %  Platelet Count - Automated : 176 K/uL  Mean Cell Volume : 98.7 fl  Mean Cell Hemoglobin : 31.8 pg  Mean Cell Hemoglobin Concentration : 32.2 gm/dL  Auto Neutrophil # : x  Auto Lymphocyte # : x  Auto Monocyte # : x  Auto Eosinophil # : x  Auto Basophil # : x  Auto Neutrophil % : x  Auto Lymphocyte % : x  Auto Monocyte % : x  Auto Eosinophil % : x  Auto Basophil % : x    02-26    143  |  102  |  40<H>  ----------------------------<  179<H>  4.0   |  30  |  0.74    Ca    9.8      26 Feb 2018 10:08  Mg     1.9     02-25            CAPILLARY BLOOD GLUCOSE          RADIOLOGY & ADDITIONAL TESTS:

## 2018-02-26 NOTE — PROGRESS NOTE ADULT - PROBLEM SELECTOR PLAN 1
currently off Bipap will continue to monitor CO@ and O2 sats  CO2 is unchanged from yesterday  follow O2 sats  chest physical therapy as needed

## 2018-02-27 NOTE — CONSULT NOTE ADULT - SUBJECTIVE AND OBJECTIVE BOX
PULMONARY CONSULT  Demian Kimble MD  [phone]    Initial HPI on admission:  HPI:  89yo F PMH of hypothyroidism, COPD on home O2, CHF.  p/w CC cough/SOB. Pt. states she has chronic cough but for past couple of days. mucous feels stuck", reports she tried her albuterol inhaler this AM but did not help. Denies fever chills CP n/v/d came to the emergency room with respiratory distress. Tests positive for viral influenza.  CXR negative for pneumonia and CHF, despite BNP of > 5000.EKG with rapid atrial flutter. Given IV cardizem, IV hydration and BiPap with improvement. Started on tamiflu. Remains alert and awake and able to provide a full history independently (23 Feb 2018 19:35)    Patient has remained in intermittent A Fib with dyspnea and intermittent BIPAP  ABG in setting of tachypnea: 7.36/63/79  PBNP elevated on admission in association with AF and infection with influenza A  CXR/s limited AP portable: questionable R basilar opacity 2/24, though CXR's limited and non diagnositc for pna      PAST MEDICAL & SURGICAL HISTORY:  Gait abnormality  Hypothyroidism  Colon cancer: 2000  COPD (chronic obstructive pulmonary disease)  Age-related incipient cataract of both eyes: 2005  H/O colectomy: 2000    Allergies    Avelox (Urticaria; Rash)  Ventolin (Angioedema)    Intolerances  FAMILY HISTORY:  No pertinent family history in first degree relatives    Review of Systems: REVIEW OF SYSTEM:  CONSTITUTIONAL: No fever, No change in weight, + fatigue  HEAD: No headache, No dizziness, No recent trauma  EYES: No eye pain, + visual disturbances, No discharge  ENT:  No difficulty hearing, No tinnitus, No vertigo, No sinus pain, No throat pain  NECK: No pain, No stiffness  BREASTS: No pain, No masses, No nipple discharge  RESPIRATORY: +cough, No wheezing, No chills, No hemoptysis, +shortness of breath at rest or exertional shortness of breath has oxgen at home.  CARDIOVASCULAR: No chest pain, + palpitations, No dizziness, + CHF, No arrhythmia, No cardiomegaly, + leg swelling  GASTROINTESTINAL: No abdominal, No epigastric pain. No nausea, No vomiting, No hematemesis, No diarrhea, No constipation. No melena, No hematochezia. No GERD  GENITOURINARY: No dysuria, No frequency, No hematuria, No incontinence, No nocturia, No hesitancy,  SKIN: No itching, No burning, No rashes, No lesions   LYMPH NODES: No history of enlarged glands  ENDOCRINE: No heat or cold intolerance, No hair loss. No osteoporosis, + thyroid disease  MUSCULOSKELETAL: No joint pain or swelling, No muscle, back, or extremity pain  PSYCHIATRIC: + depression, + anxiety, No mood swings, + difficulty sleeping  HEME/LYMPH: No easy bruising, No anticoagulants, No bleeding disorder, No bleeding gums  ALLERGY AND IMMUNOLOGIC: No hives, No eczema NEUROLOGICAL: No memory loss, No loss of strength, No numbness, No tremors	      Allergies and Intolerances:        Allergies:  	Avelox: Drug, Urticaria, Rash  	Ventolin: Drug, Angioedema, Questionable reaction. Confirmed with patient,  and PCP that patient takes albuterol at home without reaction (uses Proventil says cannot take Ventolin brand).    Home Medications:   * Incomplete Medication History as of 23-Feb-2018 19:45 documented in Structured Notes  · 	triamterene-hydrochlorothiazide 37.5 mg-25 mg oral capsule: 1 cap(s) orally every 48 hours, As Needed swelling, Last Dose Taken:    · 	Mucinex 600 mg oral tablet, extended release: 1 tab(s) orally every 12 hours, Last Dose Taken:    · 	Vitamin D3 1000 intl units oral tablet: 1 tab(s) orally 2 times a day, Last Dose Taken:          Medications:  MEDICATIONS  (STANDING):  ALBUTerol/ipratropium for Nebulization 3 milliLiter(s) Nebulizer every 6 hours  azithromycin  IVPB 500 milliGRAM(s) IV Intermittent once  azithromycin  IVPB      buDESOnide   0.25 milliGRAM(s) Respule 0.25 milliGRAM(s) Inhalation two times a day  cefepime  IVPB      cholecalciferol 1000 Unit(s) Oral two times a day  diltiazem    Tablet 30 milliGRAM(s) Oral four times a day  guaiFENesin  milliGRAM(s) Oral every 12 hours  heparin  Injectable 5000 Unit(s) SubCutaneous every 12 hours  methylPREDNISolone sodium succinate Injectable 20 milliGRAM(s) IV Push every 8 hours  oseltamivir 30 milliGRAM(s) Oral daily    MEDICATIONS  (PRN):  guaiFENesin    Syrup 200 milliGRAM(s) Oral every 6 hours PRN Cough  sodium chloride 0.65% Nasal 1 Spray(s) Both Nostrils four times a day PRN Nasal Congestion    Vital Signs Last 24 Hrs  T(C): 36.5 (27 Feb 2018 13:11), Max: 36.5 (27 Feb 2018 13:11)  T(F): 97.7 (27 Feb 2018 13:11), Max: 97.7 (27 Feb 2018 13:11)  HR: 106 (27 Feb 2018 13:11) (71 - 132)  BP: 152/64 (27 Feb 2018 13:11) (137/66 - 152/64)  BP(mean): --  RR: 19 (27 Feb 2018 13:11) (19 - 19)  SpO2: 93% (27 Feb 2018 13:11) (93% - 95%)    ABG - ( 27 Feb 2018 05:42 )  pH: 7.36  /  pCO2: 63    /  pO2: 79    / HCO3: 35    / Base Excess: 8.0   /  SaO2: 95                    02-26 @ 07:01  -  02-27 @ 07:00  --------------------------------------------------------  IN: 720 mL / OUT: 700 mL / NET: 20 mL      LABS:    02-27    146<H>  |  103  |  35<H>  ----------------------------<  146<H>  3.8   |  33<H>  |  0.76    Ca    9.6      27 Feb 2018 09:53                  CULTURES:  Culture Results:   No growth (02-24 @ 09:10)  Culture Results:   No growth to date. (02-23 @ 13:18)  Culture Results:   No growth to date. (02-23 @ 13:18)        Physical Examination:    General: Tachypneic on nasal O2,       HEENT: Pupils equal, reactive to light.  Symmetric.    PULM: Coarse crackles, bronchial breathing R 1/2; left distant; no gross wheeze    CVS: Regular rate and rhythm, no murmurs, rubs, or gallops    ABD: Soft, nondistended, nontender, normoactive bowel sounds, no masses    EXT: No edema, nontender    SKIN: Warm and well perfused, no rashes noted.    NEURO: Alert, oriented, interactive, nonfocal    RADIOLOGY REVIEWED PERSONALLY  CXR:    CT chest:    TTE:      Assessment:    Plan:

## 2018-02-27 NOTE — CONSULT NOTE ADULT - ASSESSMENT
Influenza A viral infection with COPD exacerbation  Hypercapneic resp failure: acute on chronic - ABG today appears compensated  r/o RLL pneumonia  AF with intermittent RVR and incr PBNP - possbile underlying structural CHF    REC    Empiric abx: azithromycin and cefepime started  Blood cultures X 2  Repeat PCT and PBNP  Patient would not tolerate CT chest at this time: CXR ordered  Solumderol 20 q 8 ordered  Pulmicort .25 bid added:continue duoneb  Continue BIPAP 10/5 as tolerated for dyspnea  Will assess CXR for pulm edema, though PE sugg RLL consolidation

## 2018-02-27 NOTE — PROGRESS NOTE ADULT - SUBJECTIVE AND OBJECTIVE BOX
89yo F PMH of hypothyroidism, COPD on home O2, CHF.  p/w CC cough/SOB. Pt. states she has chronic cough but for past couple of days. mucous feels stuck", reports she tried her albuterol inhaler this AM but did not help. Denies fever chills CP n/v/d came to the emergency room with respiratory distress. Tests positive for viral influenza.  CXR negative for pneumonia and CHF, despite BNP of > 5000. EKG with rapid atrial flutter. Given IV cardizem, IV hydration and BiPap with improvement. Patient being treated for influenza. today and continued complaint of back pain currently off Bipap Remains in respiratory distress.    MEDICATIONS  (STANDING):  ALBUTerol/ipratropium for Nebulization 3 milliLiter(s) Nebulizer every 6 hours  cholecalciferol 1000 Unit(s) Oral two times a day  diltiazem    Tablet 30 milliGRAM(s) Oral four times a day  guaiFENesin  milliGRAM(s) Oral every 12 hours  heparin  Injectable 5000 Unit(s) SubCutaneous every 12 hours  oseltamivir 30 milliGRAM(s) Oral daily    MEDICATIONS  (PRN):  guaiFENesin    Syrup 200 milliGRAM(s) Oral every 6 hours PRN Cough  sodium chloride 0.65% Nasal 1 Spray(s) Both Nostrils four times a day PRN Nasal Congestion    Vital Signs Last 24 Hrs  T(C): 36.5 (27 Feb 2018 13:11), Max: 36.5 (27 Feb 2018 13:11)  T(F): 97.7 (27 Feb 2018 13:11), Max: 97.7 (27 Feb 2018 13:11)  HR: 106 (27 Feb 2018 13:11) (71 - 132)  BP: 152/64 (27 Feb 2018 13:11) (137/66 - 152/64)  BP(mean): --  RR: 19 (27 Feb 2018 13:11) (19 - 19)  SpO2: 93% (27 Feb 2018 13:11) (93% - 95%)        PHYSICAL EXAM:    	GENERAL: NAD, well nourished and conversant  	HEAD:  Atraumatic  	EYES: EOM, PERRLA, conjunctiva pink and sclera white  	ENT: No tonsillar erythema, exudates, or enlargement, moist mucous membranes, good dentition, no lesions  	NECK: Supple, No JVD, normal thyroid, carotids with normal upstrokes and no bruits  	CHEST/LUNG: decreased breath sounds bilaterally. New RLL rales.  	HEART: irregular rate and rhythm, No murmurs, rubs, or gallops. + tachycardic  	ABDOMEN: Soft, nondistended, no masses, guarding, tenderness or rebound, bowel sounds present  	EXTREMITIES:  2+ Peripheral Pulses, No clubbing, cyanosis, or edema. No arthritis of shoulders, elbows, hands, hips, knees, ankles, or feet. No DJD C spine, T spine, or L/S spine  	LYMPH: No lymphadenopathy noted  	SKIN: No rashes or lesions  NERVOUS SYSTEM: lethargic,  poorly arousable     LABS:  ABG - ( 26 Feb 2018 09:54 )  pH: 7.36  /  pCO2: 58    /  pO2: 74    / HCO3: 32    / Base Excess: 5.9   /  SaO2: 94            ABG - ( 27 Feb 2018 05:42 )  pH: 7.36  /  pCO2: 63    /  pO2: 79    / HCO3: 35    / Base Excess: 8.0   /  SaO2: 95            02-27    146<H>  |  103  |  35<H>  ----------------------------<  146<H>  3.8   |  33<H>  |  0.76    Ca    9.6      27 Feb 2018 09:53                    CAPILLARY BLOOD GLUCOSE          RADIOLOGY & ADDITIONAL TESTS:

## 2018-02-27 NOTE — PROGRESS NOTE ADULT - ATTENDING COMMENTS
New onset of viral influenza with acute respiratory distress, dehydration, hypoxia, rapid atrial flutter. Continues to be in respiratory distress. For repeat CXR and pulmonary consultation. Not stable for transfer to rehabilitation at this time.

## 2018-02-28 NOTE — PROVIDER CONTACT NOTE (OTHER) - ACTION/TREATMENT ORDERED:
NP aware. STAT EKG ordered. NP assessed at bedside. Repositioned with RN. HR down to 120s at this time. IV tylenol ordered for c/o LLE pain. Give next cardizem and duoneb doses at 2300.

## 2018-02-28 NOTE — PROGRESS NOTE ADULT - SUBJECTIVE AND OBJECTIVE BOX
CC:  Influenza and new onset pneumonia       HPI:  Resting in bed Short of breath with new onset of RLL pneumonia.  Patient on azithromycin and Cefepime for HCAP  To continue steroids and bronchodilators.  Needs to start PT to regain strength and stamina . Appetite poor - try nutritional supplements.    MEDICATIONS  (STANDING):  ALBUTerol/ipratropium for Nebulization 3 milliLiter(s) Nebulizer every 6 hours  azithromycin  IVPB 500 milliGRAM(s) IV Intermittent every 24 hours  azithromycin  IVPB      buDESOnide   0.25 milliGRAM(s) Respule 0.25 milliGRAM(s) Inhalation two times a day  cefepime  IVPB 1000 milliGRAM(s) IV Intermittent every 12 hours  cefepime  IVPB      cholecalciferol 1000 Unit(s) Oral two times a day  diltiazem    Tablet 30 milliGRAM(s) Oral four times a day  guaiFENesin  milliGRAM(s) Oral every 12 hours  heparin  Injectable 5000 Unit(s) SubCutaneous every 12 hours  methylPREDNISolone sodium succinate Injectable 20 milliGRAM(s) IV Push every 8 hours    MEDICATIONS  (PRN):  guaiFENesin    Syrup 200 milliGRAM(s) Oral every 6 hours PRN Cough  sodium chloride 0.65% Nasal 1 Spray(s) Both Nostrils four times a day PRN Nasal Congestion      Allergies    Avelox (Urticaria; Rash)  Ventolin (Angioedema)    Intolerances        VITALS:   T(C): 36.4 (02-28-18 @ 12:46), Max: 36.8 (02-27-18 @ 21:12)  HR: 113 (02-28-18 @ 15:56) (71 - 113)  BP: 120/74 (02-28-18 @ 12:46) (120/74 - 131/73)  RR: 19 (02-28-18 @ 13:26) (19 - 19)  SpO2: 96% (02-28-18 @ 15:56) (90% - 97%)  Wt(kg): --    02-27 @ 07:01  -  02-28 @ 07:00  --------------------------------------------------------  IN: 770 mL / OUT: 530 mL / NET: 240 mL    02-28 @ 07:01  -  02-28 @ 20:44  --------------------------------------------------------  IN: 410 mL / OUT: 175 mL / NET: 235 mL        PHYSICAL EXAM:  GENERAL: NAD, chronically ill but conversant  HEAD:  Atraumatic  EYES: EOM, PERRLA, conjunctiva pink and sclera white  ENT: No tonsillar erythema, exudates, or enlargement, moist mucous membranes, good dentition, no lesions. Hard of hearing  NECK: Supple, No JVD, normal thyroid, carotids with normal upstrokes and no bruits  CHEST/LUNG: Clear to auscultation bilaterally, No rales, Scattered  rhonchi, no wheezing, or rubs  HEART: Regular rate and rhythm, No murmurs, rubs, or gallops  ABDOMEN: Soft, nondistended, no masses, guarding, tenderness or rebound, bowel sounds present  EXTREMITIES:  2+ Peripheral Pulses, No clubbing, cyanosis, or edema. No arthritis of shoulders, elbows, hands, hips, knees, ankles, or feet. No DJD C spine, T spine, or L/S spine  LYMPH: No lymphadenopathy noted  SKIN: No rashes or lesions  NERVOUS SYSTEM:  Alert & Oriented X3, normal cognitive function. Motor Strength 5/5 right upper and right lower.  5/5 left upper and left lower extremities, DTRs 2+ intact and symmetric    LABS:                          11.4   10.5  )-----------( 205      ( 27 Feb 2018 15:00 )             34.9     02-27    146<H>  |  103  |  35<H>  ----------------------------<  146<H>  3.8   |  33<H>  |  0.76  02-26    143  |  102  |  40<H>  ----------------------------<  179<H>  4.0   |  30  |  0.74    Ca    9.6      27 Feb 2018 09:53  Ca    9.8      26 Feb 2018 10:08      CAPILLARY BLOOD GLUCOSE          RADIOLOGY & ADDITIONAL TESTS:      Consultant(s):    Care Discussed with Consultants/Other Providers [ ] YES  [ ] NO

## 2018-02-28 NOTE — PROGRESS NOTE ADULT - ATTENDING COMMENTS
New onset of viral influenza with acute respiratory distress, dehydration, hypoxia, rapid atrial flutter. Continues to be in respiratory distress. For repeat CXR and pulmonary consultation. Not stable for transfer to rehabilitation at this time Has developed RLL infiltrate  and started on Cefepime and azithromycin.

## 2018-02-28 NOTE — PROGRESS NOTE ADULT - SUBJECTIVE AND OBJECTIVE BOX
Follow-up Pulm Progress Note  Demian Kimble MD  [phone]    CLinicallly improved today: more alert  CXR 2/27 with increased R basilar opacity c/w pneumonia and/or eff  Afebrile on AZithromlycin, cefepime, vanco X 1  Procalcitonin elevated  Remains in ;AF approx 100  ABG off BIPAP: compensated hypercapnea    Medications:  Vital Signs Last 24 Hrs  T(C): 36.2 (28 Feb 2018 05:20), Max: 36.8 (27 Feb 2018 21:12)  T(F): 97.2 (28 Feb 2018 05:20), Max: 98.2 (27 Feb 2018 21:12)  HR: 107 (28 Feb 2018 09:15) (76 - 107)  BP: 131/73 (28 Feb 2018 05:20) (121/60 - 152/64)  BP(mean): --  RR: 19 (28 Feb 2018 05:20) (19 - 20)  SpO2: 90% (28 Feb 2018 09:15) (90% - 94%)  ABG - ( 27 Feb 2018 05:42 )  pH: 7.36  /  pCO2: 63    /  pO2: 79    / HCO3: 35    / Base Excess: 8.0   /  SaO2: 95        02-27 @ 07:01  -  02-28 @ 07:00  --------------------------------------------------------  IN: 770 mL / OUT: 530 mL / NET: 240 mL    LABS:                        11.4   10.5  )-----------( 205      ( 27 Feb 2018 15:00 )             34.9     02-27    146<H>  |  103  |  35<H>  ----------------------------<  146<H>  3.8   |  33<H>  |  0.76    Ca    9.6      27 Feb 2018 09:53          Procalcitonin, Serum: 0.76 ng/mL (02-27-18 @ 15:00)    Serum Pro-Brain Natriuretic Peptide: 5807 pg/mL (02-27-18 @ 15:00)      CULTURES:  Culture Results:   No growth (02-24 @ 09:10)  Culture Results:   No growth to date. (02-23 @ 13:18)  Culture Results:   No growth to date. (02-23 @ 13:18)    Most recent blood culture -- 02-24 @ 09:10   -- -- .Urine Clean Catch (Midstream) 02-24 @ 09:10  Most recent blood culture -- 02-23 @ 13:18   -- -- .Blood Blood-Peripheral 02-23 @ 13:18      Physical Examination:  PULM: Dense crackles R 1/3 post and L bsilar; no overt wheeze  CVS: Regular rate and rhythm, no murmurs, rubs, or gallops  ABD: Soft, non-tender  EXT:  No clubbing, cyanosis, or edema    RADIOLOGY REVIEWED  CXR: R basilar consolidation    CT chest:    TTE:

## 2018-02-28 NOTE — PHYSICAL THERAPY INITIAL EVALUATION ADULT - ADDITIONAL COMMENTS
pt lives in a 1st floor of house w/ 5 steps/rail to enter.  Pt is primarily a household ambulator. Per aide, pt ambulates ~10 feet.  Pt has 24/7  home health aide.

## 2018-02-28 NOTE — PHYSICAL THERAPY INITIAL EVALUATION ADULT - PERTINENT HX OF CURRENT PROBLEM, REHAB EVAL
89yo F p/w cough/SOB.  Pt in emergency room with respiratory distress. Tests positive for viral influenza.  CXR negative for pneumonia and CHF, with rapid atrial flutter. CXR: lungs are clear.

## 2018-02-28 NOTE — PROVIDER CONTACT NOTE (OTHER) - ASSESSMENT
Pt A+Ox4. Pt anxious laying in bed; c/o "chest squeezing...need to expand...can't breathe." Pt maintaining O2 sat >94% on 4L NC, 22RR Pt A+Ox4. Pt anxious/restless laying in bed; c/o "chest squeezing...need to expand...can't breathe." Pt maintaining O2 sat >94% on 4L NC, 22RR. Lungs clear to auscultation. HR sustaining Aflutter 140-150s on cardiac monitor. Pt repositioned upright in bed; pt prefers to lay towards ride side supported with pillows. BP99/46 AT230t.

## 2018-03-01 NOTE — PROGRESS NOTE ADULT - SUBJECTIVE AND OBJECTIVE BOX
[age] yo woman present with influenza and respiratory distress. Resting in bed Short of breath with new onset of RLL pneumonia.  Patient on azithromycin and Cefepime for HCAP  To continue steroids and bronchodilators.  Needs to start PT to regain strength and stamina . Appetite poor - try nutritional supplements.        MEDICATIONS  (STANDING):  ALBUTerol/ipratropium for Nebulization 3 milliLiter(s) Nebulizer every 6 hours  azithromycin  IVPB 500 milliGRAM(s) IV Intermittent every 24 hours  azithromycin  IVPB      buDESOnide   0.25 milliGRAM(s) Respule 0.25 milliGRAM(s) Inhalation two times a day  cefepime  IVPB 1000 milliGRAM(s) IV Intermittent every 12 hours  cefepime  IVPB      cholecalciferol 1000 Unit(s) Oral two times a day  diltiazem    Tablet 30 milliGRAM(s) Oral four times a day  guaiFENesin  milliGRAM(s) Oral every 12 hours  heparin  Injectable 5000 Unit(s) SubCutaneous every 12 hours  methylPREDNISolone sodium succinate Injectable 20 milliGRAM(s) IV Push every 8 hours  vancomycin  IVPB 750 milliGRAM(s) IV Intermittent every 24 hours    MEDICATIONS  (PRN):  guaiFENesin    Syrup 200 milliGRAM(s) Oral every 6 hours PRN Cough  sodium chloride 0.65% Nasal 1 Spray(s) Both Nostrils four times a day PRN Nasal Congestion          VITALS:   T(C): 36.4 (03-01-18 @ 20:46), Max: 36.8 (03-01-18 @ 12:08)  HR: 107 (03-01-18 @ 23:05) (104 - 120)  BP: 106/62 (03-01-18 @ 23:05) (106/62 - 123/72)  RR: 19 (03-01-18 @ 23:05) (19 - 20)  SpO2: 98% (03-01-18 @ 23:05) (96% - 98%)  Wt(kg): --    PHYSICAL EXAM:  GENERAL: NAD, chronically ill but conversant  HEAD:  Atraumatic  EYES: EOM, PERRLA, conjunctiva pink and sclera white  ENT: No tonsillar erythema, exudates, or enlargement, moist mucous membranes, good dentition, no lesions. Hard of hearing  NECK: Supple, No JVD, normal thyroid, carotids with normal upstrokes and no bruits  CHEST/LUNG: Clear to auscultation bilaterally, No rales, Scattered  rhonchi, no wheezing, or rubs  HEART: Regular rate and rhythm, No murmurs, rubs, or gallops  ABDOMEN: Soft, nondistended, no masses, guarding, tenderness or rebound, bowel sounds present  EXTREMITIES:  2+ Peripheral Pulses, No clubbing, cyanosis, or edema. No arthritis of shoulders, elbows, hands, hips, knees, ankles, or feet. No DJD C spine, T spine, or L/S spine  LYMPH: No lymphadenopathy noted  SKIN: No rashes or lesions  NERVOUS SYSTEM:  Alert & Oriented X3, normal cognitive function. Motor Strength 5/5 right upper and right lower.  5/5 left upper and left lower extremities, DTRs 2+ intact and symmetric    LABS:        CBC Full  -  ( 01 Mar 2018 05:40 )  WBC Count : 11.2 K/uL  Hemoglobin : 10.6 g/dL  Hematocrit : 34.6 %  Platelet Count - Automated : 208 K/uL  Mean Cell Volume : 99.1 fl  Mean Cell Hemoglobin : 30.2 pg  Mean Cell Hemoglobin Concentration : 30.5 gm/dL  Auto Neutrophil # : x  Auto Lymphocyte # : x  Auto Monocyte # : x  Auto Eosinophil # : x  Auto Basophil # : x  Auto Neutrophil % : x  Auto Lymphocyte % : x  Auto Monocyte % : x  Auto Eosinophil % : x  Auto Basophil % : x    03-01    140  |  99  |  38<H>  ----------------------------<  206<H>  4.6   |  35<H>  |  0.85    Ca    9.9      01 Mar 2018 05:40            CAPILLARY BLOOD GLUCOSE          RADIOLOGY & ADDITIONAL TESTS:

## 2018-03-01 NOTE — PROGRESS NOTE ADULT - PROBLEM SELECTOR PLAN 1
currently off Bipap will continue to monitor CO@ and O2 sats  continue abx and nebs  chest physical therapy as needed

## 2018-03-01 NOTE — PROGRESS NOTE ADULT - SUBJECTIVE AND OBJECTIVE BOX
Follow-up Pulm Progress Note  Demian Kimble MD  [phone]    Alert, no sign tachypna, agitated  Afebrile on AZithromlycin, cefepime  Remains in AF   ABG off BIPAP: compensated hypercapnea    Vital Signs Last 24 Hrs  T(C): 36.3 (01 Mar 2018 05:10), Max: 36.4 (28 Feb 2018 12:46)  T(F): 97.3 (01 Mar 2018 05:10), Max: 97.5 (28 Feb 2018 12:46)  HR: 111 (01 Mar 2018 05:10) (71 - 135)  BP: 119/60 (01 Mar 2018 05:10) (99/54 - 120/74)  BP(mean): --  RR: 19 (01 Mar 2018 05:10) (19 - 183)  SpO2: 96% (01 Mar 2018 05:10) (91% - 97%)                          10.6   11.2  )-----------( 208      ( 01 Mar 2018 05:40 )             34.6       03-01    140  |  99  |  38<H>  ----------------------------<  206<H>  4.6   |  35<H>  |  0.85    Ca    9.9      01 Mar 2018 05:40        Procalcitonin, Serum: 0.76 ng/mL (02-27-18 @ 15:00)    Serum Pro-Brain Natriuretic Peptide: 5807 pg/mL (02-27-18 @ 15:00)      CULTURES:  Culture Results:   No growth (02-24 @ 09:10)  Culture Results:   No growth to date. (02-23 @ 13:18)  Culture Results:   No growth to date. (02-23 @ 13:18)    Most recent blood culture -- 02-24 @ 09:10   -- -- .Urine Clean Catch (Midstream) 02-24 @ 09:10  Most recent blood culture -- 02-23 @ 13:18   -- -- .Blood Blood-Peripheral 02-23 @ 13:18      Physical Examination:  PULM:R>L basilar crackles; no wheeze  CVS: Regular rate and rhythm, no murmurs, rubs, or gallops  ABD: Soft, non-tender  EXT:  No clubbing, cyanosis, or edema    RADIOLOGY REVIEWED  CXR: R basilar consolidation    CT chest:    TTE:

## 2018-03-01 NOTE — CHART NOTE - NSCHARTNOTEFT_GEN_A_CORE
CHIEF COMPLAINT: chest tightness    SUBJECTIVE / OVERNIGHT EVENTS: Notified at 2151 that patient was complaining of chest tightness and was sustaining 140's to 150's on tele. At bedside EKG showed aflutter 121 and heart rate decreased further within minutes and patients symptoms resolved spontaneously. patient stated that she was uncomfortable in bed and wanted to be repositioned. Denies chest pain, headache, shortness of breath    REVIEW OF SYSTEMS:  Constitutional: No fever, fatigue or weight loss.  Skin: No rash.  Eyes: No recent vision problems or eye pain.  ENT: No congestion, ear pain, or sore throat.  Endocrine: No thyroid problems.  Cardiovascular: resolved chest tightness No chest pain or palpation.  Respiratory: No cough, shortness of breath, congestion, or wheezing.  Gastrointestinal: No abdominal pain, nausea, vomiting, or diarrhea.  Genitourinary: No dysuria.  Musculoskeletal: Left him pain  Neurologic: No headache.        CAPILLARY BLOOD GLUCOSE        I&O's Summary    27 Feb 2018 07:01  -  28 Feb 2018 07:00  --------------------------------------------------------  IN: 770 mL / OUT: 530 mL / NET: 240 mL    28 Feb 2018 07:01  -  01 Mar 2018 01:08  --------------------------------------------------------  IN: 560 mL / OUT: 375 mL / NET: 185 mL        LABS:                        11.4   10.5  )-----------( 205      ( 27 Feb 2018 15:00 )             34.9     02-27    146<H>  |  103  |  35<H>  ----------------------------<  146<H>  3.8   |  33<H>  |  0.76    Ca    9.6      27 Feb 2018 09:53        RADIOLOGY & ADDITIONAL TESTS:  < from: Xray Chest 1 View- PORTABLE-Urgent (02.27.18 @ 15:18) >    MPRESSION:  Ill-definedright lower lung airspace opacity may reflect effusion,   atelectasis, and/or infection.      < end of copied text >      MEDICATIONS  (STANDING):  ALBUTerol/ipratropium for Nebulization 3 milliLiter(s) Nebulizer every 6 hours  azithromycin  IVPB 500 milliGRAM(s) IV Intermittent every 24 hours  azithromycin  IVPB      buDESOnide   0.25 milliGRAM(s) Respule 0.25 milliGRAM(s) Inhalation two times a day  cefepime  IVPB 1000 milliGRAM(s) IV Intermittent every 12 hours  cefepime  IVPB      cholecalciferol 1000 Unit(s) Oral two times a day  diltiazem    Tablet 30 milliGRAM(s) Oral four times a day  guaiFENesin  milliGRAM(s) Oral every 12 hours  heparin  Injectable 5000 Unit(s) SubCutaneous every 12 hours  methylPREDNISolone sodium succinate Injectable 20 milliGRAM(s) IV Push every 8 hours    MEDICATIONS  (PRN):  guaiFENesin    Syrup 200 milliGRAM(s) Oral every 6 hours PRN Cough  sodium chloride 0.65% Nasal 1 Spray(s) Both Nostrils four times a day PRN Nasal Congestion      PHYSICAL EXAM:  GENERAL: anxious, non-toxic in appearance thin  NEUROLOGY/PSYCHIATRIC: awake alert and oriented times 4 non-focal deficits  CARDIOVASCULAR: Regular rate and rhythm; No murmurs, rubs, or gallops  RESPIRATORY: RR 22 Clear to auscultation bilaterally; No wheeze No accessory muscle use  GASTROINTESTINAL: Soft, Nontender, Nondistended; Bowel sounds present  EXTREMITIES:  full ROM with pain to left leg. moves all extremities  2+ Peripheral Pulses, No clubbing, cyanosis, or edema  SKIN: No rashes or lesions  GENITOURINARY: Non distended bladder    PMH/PSH  Gait abnormality  Hypothyroidism  Colon cancer  COPD (chronic obstructive pulmonary disease)  Acute on chronic respiratory failure with hypoxia and hypercapnia  Age-related incipient cataract of both eyes  H/O colectomy  No significant past surgical history  No significant past surgical history        ASSESSMENT/PLAN:   HPI:  89yo F PMH of hypothyroidism, COPD on home O2, CHF.  p/w CC cough/SOB. Pt. states she has chronic cough but for past couple of days. mucous feels stuck", reports she tried her albuterol inhaler this AM but did not help. Denies fever chills CP n/v/d came to the emergency room with respiratory distress. Tests positive for viral influenza.  CXR negative for pneumonia and CHF, despite BNP of > 5000.EKG with rapid atrial flutter. Given IV Cardizem, IV hydration and BiPAP with improvement. Started on Tamiflu. Remains alert and awake and able to provide a full history independently (23 Feb 2018 19:35) resolved chest tightness and decreased HR    Plan  EKG (a-flutter)  reposition patient as pain to left hip/leg may be contributing to her elevated hr  Tylenol for pain  c/w meds    Will continue to monitor and endorse to primary day team    Kamille Turner NP  Spectra link [phone]        Care Discussed with Consultants/Other Providers:

## 2018-03-02 NOTE — PROVIDER CONTACT NOTE (OTHER) - ASSESSMENT
Patient A&Ox4. HR sustaining afib 120's-130's on cardiac monitor. Patient received diltiazem 30 mg PO at 2305. Patient states she "can't breath." Patient repositioned. O2 95% on 4L NC. RR 25. /66. Lung sounds are fine crackles bilaterally upon auscultation. Patient A&Ox4. HR sustaining afib 120's-130's on cardiac monitor. Patient received diltiazem 30 mg PO at 2305. Patient is anxious and states she "can't breath." Patient repositioned. O2 95% on 4L NC. RR 25. /66. Lung sounds have crackles bilaterally upon auscultation. Patient A&Ox4. HR sustaining afib 120's-130's on cardiac monitor. Patient received diltiazem 30 mg PO at 2305. Patient is anxious and states she "can't breath." Patient repositioned. O2 95% on 4L NC. RR 25. /66. Lung sounds have crackles bilaterally upon auscultation. Bipap recommended by NP after ABG results. Patient refusing Bipap. NP aware

## 2018-03-02 NOTE — PROVIDER CONTACT NOTE (OTHER) - ASSESSMENT
Patient converted from AFIB to NS rhythm as per tele tech. pt a0x4, pt last converted from Afib to NS on 2/28 then back to Afib. Patient on cardizem po.

## 2018-03-02 NOTE — CONSULT NOTE ADULT - SUBJECTIVE AND OBJECTIVE BOX
Chief Complaint:     HPI:    PMH:   Gait abnormality  Hypothyroidism  Colon cancer  COPD (chronic obstructive pulmonary disease)    PSH:   Age-related incipient cataract of both eyes  H/O colectomy  No significant past surgical history  No significant past surgical history    Family History:  FAMILY HISTORY:  No pertinent family history in first degree relatives    Allergies:  Avelox (Urticaria; Rash)  Ventolin (Angioedema)    Social History:  Smoking:  Alcohol:  Drugs:    Medications:  ALBUTerol/ipratropium for Nebulization 3 milliLiter(s) Nebulizer every 6 hours  azithromycin  IVPB 500 milliGRAM(s) IV Intermittent every 24 hours  azithromycin  IVPB      buDESOnide   0.25 milliGRAM(s) Respule 0.25 milliGRAM(s) Inhalation two times a day  cefepime  IVPB 1000 milliGRAM(s) IV Intermittent every 12 hours  cefepime  IVPB      cholecalciferol 1000 Unit(s) Oral two times a day  diltiazem    Tablet 30 milliGRAM(s) Oral four times a day  guaiFENesin    Syrup 200 milliGRAM(s) Oral every 6 hours PRN  guaiFENesin  milliGRAM(s) Oral every 12 hours  heparin  Injectable 5000 Unit(s) SubCutaneous every 12 hours  methylPREDNISolone sodium succinate Injectable 20 milliGRAM(s) IV Push every 8 hours  sodium chloride 0.65% Nasal 1 Spray(s) Both Nostrils four times a day PRN  tiotropium 18 MICROgram(s) Capsule 1 Capsule(s) Inhalation daily  vancomycin  IVPB 750 milliGRAM(s) IV Intermittent every 24 hours      Cardiovascular Diagnostic Testing:  ECG:    Echo:    Stress Testing:    Cath:    Imaging:    Labs:                        11.1   17.2  )-----------( 274      ( 02 Mar 2018 07:05 )             34.4         140  |  98  |  48<H>  ----------------------------<  201<H>  5.1   |  34<H>  |  0.90    Ca    10.1      02 Mar 2018 07:05  Mg     2.4                 Serum Pro-Brain Natriuretic Peptide: 5807 pg/mL ( @ 15:00)  Serum Pro-Brain Natriuretic Peptide: 5355 pg/mL ( @ 11:10)            Physical Exam:  T(C): 36.8 (18 @ 05:56), Max: 36.8 (18 @ 12:08)  HR: 83 (18 @ 08:34) (83 - 128)  BP: 131/76 (18 @ 08:34) (103/70 - 137/66)  RR: 19 (18 @ 05:56) (19 - 25)  SpO2: 94% (18 @ 05:56) (94% - 98%)  Wt(kg): --     @ 07:01  -   @ 07:00  --------------------------------------------------------  IN: 890 mL / OUT: 700 mL / NET: 190 mL      Daily     Daily Weight in k.7 (02 Mar 2018 05:56) Chief Complaint:  AF with RVR.     HPI: 89 y/o F with PMH as stated admitted with Flu. This resulted in COPD exacerbation and more recently PNA. She was started on abx. In the setting of this the patient converted to AF with RVR. Patient gives limited history. She denies palpitations. She has have shortness of breath. No chest pain. No orthopnea. No PND. No lightheadedness.     PMH:   Gait abnormality  Hypothyroidism  Colon cancer  COPD (chronic obstructive pulmonary disease)    PSH:   Age-related incipient cataract of both eyes  H/O colectomy  No significant past surgical history  No significant past surgical history    Family History:  FAMILY HISTORY:  No pertinent family history in first degree relatives    Allergies:  Avelox (Urticaria; Rash)  Ventolin (Angioedema)    Social History:  Smoking: No active.   Alcohol:  Drugs:    Medications:  ALBUTerol/ipratropium for Nebulization 3 milliLiter(s) Nebulizer every 6 hours  azithromycin  IVPB 500 milliGRAM(s) IV Intermittent every 24 hours  azithromycin  IVPB      buDESOnide   0.25 milliGRAM(s) Respule 0.25 milliGRAM(s) Inhalation two times a day  cefepime  IVPB 1000 milliGRAM(s) IV Intermittent every 12 hours  cefepime  IVPB      cholecalciferol 1000 Unit(s) Oral two times a day  diltiazem    Tablet 30 milliGRAM(s) Oral four times a day  guaiFENesin    Syrup 200 milliGRAM(s) Oral every 6 hours PRN  guaiFENesin  milliGRAM(s) Oral every 12 hours  heparin  Injectable 5000 Unit(s) SubCutaneous every 12 hours  methylPREDNISolone sodium succinate Injectable 20 milliGRAM(s) IV Push every 8 hours  sodium chloride 0.65% Nasal 1 Spray(s) Both Nostrils four times a day PRN  tiotropium 18 MICROgram(s) Capsule 1 Capsule(s) Inhalation daily  vancomycin  IVPB 750 milliGRAM(s) IV Intermittent every 24 hours      Cardiovascular Diagnostic Testing:  ECG:  AF @ 121, no acute St-T changes.     Echo:     Stress Testing:    Cath:    Imaging:     Labs:< from: Xray Chest 1 View- PORTABLE-Urgent (18 @ 03:17) >  Chin overlies the right apex.  The study is very limited secondary to patient positioning.  There may be small bilateral pleural effusions.  No left sided pneumothorax.  Degenerative changes of spine are noted.  The cardiac silhouette isnot accurately assessed in this projection.    < end of copied text >                          11.1   17.2  )-----------( 274      ( 02 Mar 2018 07:05 )             34.4     03-02    140  |  98  |  48<H>  ----------------------------<  201<H>  5.1   |  34<H>  |  0.90    Ca    10.1      02 Mar 2018 07:05  Mg     2.4     03-02            Serum Pro-Brain Natriuretic Peptide: 5807 pg/mL ( @ 15:00)  Serum Pro-Brain Natriuretic Peptide: 5355 pg/mL ( @ 11:10)            Physical Exam:  T(C): 36.8 (18 @ 05:56), Max: 36.8 (18 @ 12:08)  HR: 83 (18 @ 08:34) (83 - 128)  BP: 131/76 (18 @ 08:34) (103/70 - 137/66)  RR: 19 (18 @ 05:56) (19 - 25)  SpO2: 94% (18 @ 05:56) (94% - 98%)  Wt(kg): --     @ 07:01  -   @ 07:00  --------------------------------------------------------  IN: 890 mL / OUT: 700 mL / NET: 190 mL      Daily     Daily Weight in k.7 (02 Mar 2018 05:56)

## 2018-03-02 NOTE — PROGRESS NOTE ADULT - SUBJECTIVE AND OBJECTIVE BOX
Follow-up Pulm Progress Note  Demian Kimble MD  [phone]    MOre alert today, still c/o SOB and weakness  Afebrile on AZithromlycin, cefepime, vanco  Remains in AF   ABG off BIPAP:   ABG: hypercapnea remains compensated    Vital Signs Last 24 Hrs  T(C): 36.8 (02 Mar 2018 05:56), Max: 36.8 (02 Mar 2018 05:56)  T(F): 98.2 (02 Mar 2018 05:56), Max: 98.2 (02 Mar 2018 05:56)  HR: 83 (02 Mar 2018 08:34) (83 - 128)  BP: 131/76 (02 Mar 2018 08:34) (103/70 - 137/66)  BP(mean): --  RR: 19 (02 Mar 2018 05:56) (19 - 25)  SpO2: 94% (02 Mar 2018 05:56) (94% - 98%)    ABG - ( 02 Mar 2018 02:20 )  pH: 7.36  /  pCO2: 68    /  pO2: 92    / HCO3: 37    / Base Excess: 10.1  /  SaO2: 97                           11.1   17.2  )-----------( 274      ( 02 Mar 2018 07:05 )             34.4       03-02    140  |  98  |  48<H>  ----------------------------<  201<H>  5.1   |  34<H>  |  0.90    Ca    10.1      02 Mar 2018 07:05  Mg     2.4     03-02      03-01    140  |  99  |  38<H>  ----------------------------<  206<H>  4.6   |  35<H>  |  0.85    Ca    9.9      01 Mar 2018 05:40        Procalcitonin, Serum: 0.76 ng/mL (02-27-18 @ 15:00)    Serum Pro-Brain Natriuretic Peptide: 5807 pg/mL (02-27-18 @ 15:00)      CULTURES:  Culture Results:   No growth (02-24 @ 09:10)  Culture Results:   No growth to date. (02-23 @ 13:18)  Culture Results:   No growth to date. (02-23 @ 13:18)    Most recent blood culture -- 02-24 @ 09:10   -- -- .Urine Clean Catch (Midstream) 02-24 @ 09:10  Most recent blood culture -- 02-23 @ 13:18   -- -- .Blood Blood-Peripheral 02-23 @ 13:18      Physical Examination:  PULM:R>L basilar crackles/ diminshed BS; no wheeze  CVS: Regular rate and rhythm, no murmurs, rubs, or gallops  ABD: Soft, non-tender  EXT:  No clubbing, cyanosis, or edema    RADIOLOGY REVIEWED  CXR: R basilar consolidation    CT chest:    TTE:

## 2018-03-02 NOTE — PROGRESS NOTE ADULT - SUBJECTIVE AND OBJECTIVE BOX
Patient is a 89y old  Female who presents with a chief complaint of COUGH,SOB (24 Feb 2018 03:47)      HPI:    MEDICATIONS  (STANDING):  ALBUTerol/ipratropium for Nebulization 3 milliLiter(s) Nebulizer every 6 hours  azithromycin  IVPB 500 milliGRAM(s) IV Intermittent every 24 hours  azithromycin  IVPB      buDESOnide   0.25 milliGRAM(s) Respule 0.25 milliGRAM(s) Inhalation two times a day  cefepime  IVPB 1000 milliGRAM(s) IV Intermittent every 12 hours  cefepime  IVPB      cholecalciferol 1000 Unit(s) Oral two times a day  diltiazem    Tablet 30 milliGRAM(s) Oral four times a day  docusate sodium 100 milliGRAM(s) Oral two times a day  guaiFENesin  milliGRAM(s) Oral every 12 hours  heparin  Injectable 5000 Unit(s) SubCutaneous every 12 hours  lidocaine   Patch 1 Patch Transdermal daily  methylPREDNISolone sodium succinate Injectable 20 milliGRAM(s) IV Push every 8 hours  polyethylene glycol 3350 17 Gram(s) Oral daily  senna 2 Tablet(s) Oral at bedtime  tiotropium 18 MICROgram(s) Capsule 1 Capsule(s) Inhalation daily  vancomycin  IVPB 750 milliGRAM(s) IV Intermittent every 24 hours    MEDICATIONS  (PRN):  acetaminophen   Tablet. 650 milliGRAM(s) Oral every 6 hours PRN Moderate Pain (4 - 6)  guaiFENesin    Syrup 200 milliGRAM(s) Oral every 6 hours PRN Cough  sodium chloride 0.65% Nasal 1 Spray(s) Both Nostrils four times a day PRN Nasal Congestion      Allergies    Avelox (Urticaria; Rash)  Ventolin (Angioedema)    Intolerances        REVIEW OF SYSTEM:  CONSTITUTIONAL: No fever, No change in weight, No fatigue  HEAD: No headache, No dizziness, No recent trauma  EYES: No eye pain, No visual disturbances, No discharge  ENT:  No difficulty hearing, No tinnitus, No vertigo, No sinus pain, No throat pain  NECK: No pain, No stiffness  BREASTS: No pain, No masses, No nipple discharge  RESPIRATORY: No cough, No wheezing, No chills, No hemoptysis, No shortness of breath at rest or exertional shortness of breath  CARDIOVASCULAR: No chest pain, No palpitations, No dizziness, No CHF, No arrhythmia, No cardiomegaly, No leg swelling  GASTROINTESTINAL: No abdominal, No epigastric pain. No nausea, No vomiting, No hematemesis, No diarrhea, No constipation. No melena, No hematochezia. No GERD  GENITOURINARY: No dysuria, No frequency, No hematuria, No incontinence, No nocturia, No hesitancy,  SKIN: No itching, No burning, No rashes, No lesions   LYMPH NODES: No history of enlarged glands  ENDOCRINE: No heat or cold intolerance, No hair loss. No osteoporosis, No thyroid disease  MUSCULOSKELETAL: No joint pain or swelling, No muscle, back, or extremity pain  PSYCHIATRIC: No depression, No anxiety, No mood swings, No difficulty sleeping  HEME/LYMPH: No easy bruising, No anticoagulants, No bleeding disorder, No bleeding gums  ALLERGY AND IMMUNOLOGIC: No hives, No eczema  NEUROLOGICAL: No memory loss, No loss of strength, No numbness, No tremors        VITALS:   T(C): 36.7 (03-02-18 @ 12:03), Max: 36.8 (03-02-18 @ 05:56)  HR: 88 (03-02-18 @ 17:51) (83 - 128)  BP: 119/88 (03-02-18 @ 17:51) (103/70 - 137/66)  RR: 20 (03-02-18 @ 12:03) (19 - 25)  SpO2: 92% (03-02-18 @ 12:03) (92% - 98%)  Wt(kg): --    03-01 @ 07:01  -  03-02 @ 07:00  --------------------------------------------------------  IN: 1040 mL / OUT: 700 mL / NET: 340 mL    03-02 @ 07:01  -  03-02 @ 19:34  --------------------------------------------------------  IN: 520 mL / OUT: 900 mL / NET: -380 mL        PHYSICAL EXAM:  GENERAL: NAD, well nourished and conversant  HEAD:  Atraumatic  EYES: EOM, PERRLA, conjunctiva pink and sclera white  ENT: No tonsillar erythema, exudates, or enlargement, moist mucous membranes, good dentition, no lesions  NECK: Supple, No JVD, normal thyroid, carotids with normal upstrokes and no bruits  CHEST/LUNG: Clear to auscultation bilaterally, No rales, rhonchi, wheezing, or rubs  HEART: Regular rate and rhythm, No murmurs, rubs, or gallops  ABDOMEN: Soft, nondistended, no masses, guarding, tenderness or rebound, bowel sounds present  EXTREMITIES:  2+ Peripheral Pulses, No clubbing, cyanosis, or edema. No arthritis of shoulders, elbows, hands, hips, knees, ankles, or feet. No DJD C spine, T spine, or L/S spine  LYMPH: No lymphadenopathy noted  SKIN: No rashes or lesions  NERVOUS SYSTEM:  Alert & Oriented X3, normal cognitive function. Motor Strength 5/5 right upper and right lower.  5/5 left upper and left lower extremities, DTRs 2+ intact and symmetric    LABS:                          11.1   17.2  )-----------( 274      ( 02 Mar 2018 07:05 )             34.4     03-02    140  |  98  |  48<H>  ----------------------------<  201<H>  5.1   |  34<H>  |  0.90  03-01    140  |  99  |  38<H>  ----------------------------<  206<H>  4.6   |  35<H>  |  0.85    Ca    10.1      02 Mar 2018 07:05  Ca    9.9      01 Mar 2018 05:40  Mg     2.4     03-02      CAPILLARY BLOOD GLUCOSE          RADIOLOGY & ADDITIONAL TESTS:      Consultant(s):    Care Discussed with Consultants/Other Providers [ ] YES  [ ] NO Patient is a 89y old  Female who presents with a chief complaint of COUGH,SOB (24 Feb 2018 03:47)      HPI: Patient more alert and interactive (+) course rhonchi on right side.  Patient with severe restrictive disease due to body habitus.  INcresed dose of  cardizem seems to be tolerated . Heart rate better controlled    MEDICATIONS  (STANDING):  ALBUTerol/ipratropium for Nebulization 3 milliLiter(s) Nebulizer every 6 hours  azithromycin  IVPB 500 milliGRAM(s) IV Intermittent every 24 hours  azithromycin  IVPB      buDESOnide   0.25 milliGRAM(s) Respule 0.25 milliGRAM(s) Inhalation two times a day  cefepime  IVPB 1000 milliGRAM(s) IV Intermittent every 12 hours  cefepime  IVPB      cholecalciferol 1000 Unit(s) Oral two times a day  diltiazem    Tablet 30 milliGRAM(s) Oral four times a day  docusate sodium 100 milliGRAM(s) Oral two times a day  guaiFENesin  milliGRAM(s) Oral every 12 hours  heparin  Injectable 5000 Unit(s) SubCutaneous every 12 hours  lidocaine   Patch 1 Patch Transdermal daily  methylPREDNISolone sodium succinate Injectable 20 milliGRAM(s) IV Push every 8 hours  polyethylene glycol 3350 17 Gram(s) Oral daily  senna 2 Tablet(s) Oral at bedtime  tiotropium 18 MICROgram(s) Capsule 1 Capsule(s) Inhalation daily  vancomycin  IVPB 750 milliGRAM(s) IV Intermittent every 24 hours    MEDICATIONS  (PRN):  acetaminophen   Tablet. 650 milliGRAM(s) Oral every 6 hours PRN Moderate Pain (4 - 6)  guaiFENesin    Syrup 200 milliGRAM(s) Oral every 6 hours PRN Cough  sodium chloride 0.65% Nasal 1 Spray(s) Both Nostrils four times a day PRN Nasal Congestion      Allergies    Avelox (Urticaria; Rash)  Ventolin (Angioedema)    Intolerances      VITALS:   T(C): 36.7 (03-02-18 @ 12:03), Max: 36.8 (03-02-18 @ 05:56)  HR: 88 (03-02-18 @ 17:51) (83 - 128)  BP: 119/88 (03-02-18 @ 17:51) (103/70 - 137/66)  RR: 20 (03-02-18 @ 12:03) (19 - 25)  SpO2: 92% (03-02-18 @ 12:03) (92% - 98%)  Wt(kg): --    03-01 @ 07:01  -  03-02 @ 07:00  --------------------------------------------------------  IN: 1040 mL / OUT: 700 mL / NET: 340 mL    03-02 @ 07:01  -  03-02 @ 19:34  --------------------------------------------------------  IN: 520 mL / OUT: 900 mL / NET: -380 mL        PHYSICAL EXAM:  GENERAL: NAD, very thin and conversant  HEAD:  Atraumatic  EYES: EOM, PERRLA, conjunctiva pink and sclera white  ENT: No tonsillar erythema, exudates,   NECK: Supple, No JVD, normal thyroid, carotids with normal upstrokes and no bruits  CHEST/LUNG: Coarse rhonchi right chest   HEART: Irreg irregular rate and rhythm, No murmurs, rubs, or gallops  ABDOMEN: Soft, nondistended, no masses, guarding, tenderness or rebound, bowel sounds present  EXTREMITIES:  2+ Peripheral Pulses, No clubbing, cyanosis, or edema. Diffuse arthritis and osteoporosis, T spine, or L/S spine with severe kyphoscoliosis  LYMPH: No lymphadenopathy noted  SKIN: No rashes or lesions  NERVOUS SYSTEM:  Alert & Oriented X3, normal cognitive function. Motor Strength 5/5 right upper and right lower.  5/5 left upper and left lower extremities, DTRs 2+ intact and symmetric    LABS:                          11.1   17.2  )-----------( 274      ( 02 Mar 2018 07:05 )             34.4     03-02    140  |  98  |  48<H>  ----------------------------<  201<H>  5.1   |  34<H>  |  0.90  03-01    140  |  99  |  38<H>  ----------------------------<  206<H>  4.6   |  35<H>  |  0.85    Ca    10.1      02 Mar 2018 07:05  Ca    9.9      01 Mar 2018 05:40  Mg     2.4     03-02      CAPILLARY BLOOD GLUCOSE          RADIOLOGY & ADDITIONAL TESTS:      Consultant(s):    Care Discussed with Consultants/Other Providers [ ] YES  [ ] NO

## 2018-03-02 NOTE — CHART NOTE - NSCHARTNOTEFT_GEN_A_CORE
Notified by RN that patient c/o SOB. seen abd examined the patient with HR in 140's. Afib with RVR to aflutter on 12 lead EKG. Patient denies HA, palpitations, CP, visual changes, N/V/D, dysuria.     Vital Signs Last 24 Hrs  T(C): 36.4 (01 Mar 2018 20:46), Max: 36.8 (01 Mar 2018 12:08)  T(F): 97.5 (01 Mar 2018 20:46), Max: 98.3 (01 Mar 2018 12:08)  HR: 128 (02 Mar 2018 01:48) (104 - 128)  BP: 137/66 (02 Mar 2018 01:48) (106/62 - 137/66)  BP(mean): --  RR: 25 (02 Mar 2018 01:48) (19 - 25)  SpO2: 95% (02 Mar 2018 01:48) (95% - 98%)      Labs:                          10.6   11.2  )-----------( 208      ( 01 Mar 2018 05:40 )             34.6     03-01    140  |  99  |  38<H>  ----------------------------<  206<H>  4.6   |  35<H>  |  0.85    Ca    9.9      01 Mar 2018 05:40    Radiology: Ill-definedright lower lung airspace opacity may reflect effusion,   atelectasis, and/or infection.      MEDICATIONS  (STANDING):< from: Xray Chest 1 View- PORTABLE-Urgent (02.27.18 @ 15:18) >      ALBUTerol/ipratropium for Nebulization 3 milliLiter(s) Nebulizer every 6 hours  azithromycin  IVPB 500 milliGRAM(s) IV Intermittent every 24 hours  azithromycin  IVPB      buDESOnide   0.25 milliGRAM(s) Respule 0.25 milliGRAM(s) Inhalation two times a day  cefepime  IVPB 1000 milliGRAM(s) IV Intermittent every 12 hours  cefepime  IVPB      cholecalciferol 1000 Unit(s) Oral two times a day  diltiazem    Tablet 30 milliGRAM(s) Oral four times a day  diltiazem Injectable 10 milliGRAM(s) IV Push once  guaiFENesin  milliGRAM(s) Oral every 12 hours  heparin  Injectable 5000 Unit(s) SubCutaneous every 12 hours  methylPREDNISolone sodium succinate Injectable 20 milliGRAM(s) IV Push every 8 hours  tiotropium 18 MICROgram(s) Capsule 1 Capsule(s) Inhalation daily  vancomycin  IVPB 750 milliGRAM(s) IV Intermittent every 24 hours    MEDICATIONS  (PRN):  guaiFENesin    Syrup 200 milliGRAM(s) Oral every 6 hours PRN Cough  sodium chloride 0.65% Nasal 1 Spray(s) Both Nostrils four times a day PRN Nasal Congestion      Physical Exam:  General: Anxious,   Neurology: A&Ox3, nonfocal, LUCERO x 4  Respiratory: Expiratory wheezes present, fine crackles on B/L LL  CV: IRREGULAR RR S1S2, no murmur  Abdominal: Soft, NT, ND no palpable mass  MSK: No edema, + peripheral pulses, FROM all 4 extremity    Assessment & Plan:  89yo F PMH of hypothyroidism, COPD on home O2, CHF (No echo results available in the chart) admitted with Influenza positive, ANNE-MARIE, treated with Cardizem IV in the ED, and IV hydration. patient on PO Cardizem now. PATIENT off BIPAP now, last ABG with pco2 compensated. now patient with ANNE-MARIE to Aflutter, and SOB. Since admission, patient has been fluctuating from afib to aflutter. Aflutter likely from Flu, In setting of patient in and out of Afib consider cardiology consult. Patient not on any anticoagulation.    12 lead EKG:ANNE-MARIE to Aflutter  ABG stat  BIPAP for dyspnea relief  Atrovent Nebulization  CXR stat  Lasix if dyspnea not getting better  TTE since aptient had no Echo in the chart  Continue Cardizem PO  Continue Tamiflu and ABX for PNA  Will continue to monitor  Will call Attending , cards consult per Attending discretion  Will update with primary team    Bhavesh [facility]  Spectra#[id number]          >  >        Follow up with Attending in AM.

## 2018-03-02 NOTE — PROVIDER CONTACT NOTE (OTHER) - ACTION/TREATMENT ORDERED:
NP made aware. 12 lead ecg ordered. NP to follow up orders. Continue to monitor patient NP made aware. 12 lead ecg ordered. NP to assess patient. ABG ordered and chest x-ray ordered. Ipratropium for nebulization ordered. Cardizem 10 mg IVP ordered. Continue to monitor patient NP made aware. 12 lead ecg ordered. NP to assess patient. ABG ordered and chest x-ray ordered. Ipratropium for nebulization ordered. Cardizem 10 mg IVP ordered. Pt refusing Bipap- NP aware

## 2018-03-02 NOTE — CONSULT NOTE ADULT - ASSESSMENT
89 F with Flu, COPD exacerbation, PNA now with AF with RVR.   ·	AF likely driven by her underlying illness.   ·	Agree with cardizem. Increase to 60 mg PO q6 hours. If BP precludes further increase would consider amio.    ·	Monitor tele  ·	Continue abx and pulm treatments as per Dr. Kimble.   ·	Will follow.

## 2018-03-03 NOTE — PROVIDER CONTACT NOTE (CRITICAL VALUE NOTIFICATION) - ASSESSMENT
pt A/O 3. VSS. O2 sat 92% on O2 3 L NC pt A/O 3. pt complaints of SOB; denies chest pain. VS taken. O2 sat 92% on O2 3 L NC

## 2018-03-03 NOTE — CHART NOTE - NSCHARTNOTEFT_GEN_A_CORE
Pt tachypneic and hypercarbic , this AM , Pt was evaluated by DR Kimble and ICU team . c/w BIPAP  and  diuresis  Monitor Pt clinically and closely  for MS changes  and co2 narcosis   Pt is a full code . Will sign out Pt to night covering Team .

## 2018-03-03 NOTE — PROGRESS NOTE ADULT - SUBJECTIVE AND OBJECTIVE BOX
Follow-up Pulm Progress Note  Demian Kimble MD  [phone]    Events noted: More SOB overnight, now on BIPAP   Repeat CXR: ? incr in R effusion/opacity, trace L effusion, pulm vas congestion  Remains in interittent AF  AB.//94    Vital Signs Last 24 Hrs  T(C): 36.8 (02 Mar 2018 05:56), Max: 36.8 (02 Mar 2018 05:56)  T(F): 98.2 (02 Mar 2018 05:56), Max: 98.2 (02 Mar 2018 05:56)  HR: 83 (02 Mar 2018 08:34) (83 - 128)  BP: 131/76 (02 Mar 2018 08:34) (103/70 - 137/66)  BP(mean): --  RR: 19 (02 Mar 2018 05:56) (19 - 25)  SpO2: 94% (02 Mar 2018 05:56) (94% - 98%)    ABG - ( 02 Mar 2018 02:20 )  pH: 7.36  /  pCO2: 68    /  pO2: 92    / HCO3: 37    / Base Excess: 10.1  /  SaO2: 97                           11.1   17.2  )-----------( 274      ( 02 Mar 2018 07:05 )             34.4       03-    140  |  98  |  48<H>  ----------------------------<  201<H>  5.1   |  34<H>  |  0.90    Ca    10.1      02 Mar 2018 07:05  Mg     2.4     -      03-    140  |  99  |  38<H>  ----------------------------<  206<H>  4.6   |  35<H>  |  0.85    Ca    9.9      01 Mar 2018 05:40        Procalcitonin, Serum: 0.76 ng/mL (18 @ 15:00)    Serum Pro-Brain Natriuretic Peptide: 5807 pg/mL (18 @ 15:00)      CULTURES:  Culture Results:   No growth ( @ 09:10)  Culture Results:   No growth to date. ( @ 13:18)  Culture Results:   No growth to date. ( @ 13:18)    Most recent blood culture --  @ 09:10   -- -- .Urine Clean Catch (Midstream)  @ 09:10  Most recent blood culture --  @ 13:18   -- -- .Blood Blood-Peripheral  @ 13:18      Physical Examination: Lethargic on BIPAP  PULM: Decreased excursion with R diminished BS  CVS: 2/6 holosytolic; S1 S2  ABD: Soft, non-tender  EXT:  No clubbing, cyanosis, or edema    RADIOLOGY REVIEWED  CXR: R>L effusion, pulm vasc mónica, R 1/2 opacity    CT chest:    TTE:

## 2018-03-03 NOTE — PROGRESS NOTE ADULT - ATTENDING COMMENTS
New onset of viral influenza with acute respiratory distress, dehydration, hypoxia, rapid atrial flutter. Continues to be in respiratory distress. For repeat CXR and pulmonary consultation. Not stable for transfer to rehabilitation at this time Has developed RLL infiltrate  and started on Cefepime and azithromycin.  CHF:  elevated bnp start on Lasic 40 mg po bid  Follow up BNP

## 2018-03-03 NOTE — SWALLOW BEDSIDE ASSESSMENT ADULT - SWALLOW EVAL: DIAGNOSIS
Consult for bedside swallow evaluation received and appreciated. Chart reviewed, events noted. Noted pt in worsening SOB this AM w/ ABG critical PCO2 lab value 85%, BIPAP ordered, and MICU consulted. This service to d/w NP re: deferring swallowe valuation at this time given above. Consult for bedside swallow evaluation received and appreciated. Chart reviewed, events noted. Noted pt in worsening SOB this AM w/ ABG critical PCO2 lab value 85%, BIPAP ordered, and MICU consulted. D/w NP [place], pt remains on BIPAP at this time, swallowing evaluation to be deferred at this time. This service to f/u Monday, 3/5. Consult for bedside swallow evaluation received and appreciated. Chart reviewed, events noted. Noted pt in worsening SOB this AM w/ ABG critical PCO2 lab value 85%, BIPAP ordered, and MICU consulted. D/w NP [place], pt remains on BIPAP at this time, swallowing evaluation to be deferred on this date. This service to f/u Monday, 3/5.

## 2018-03-03 NOTE — CONSULT NOTE ADULT - SUBJECTIVE AND OBJECTIVE BOX
CHIEF COMPLAINT:    HPI:    PAST MEDICAL & SURGICAL HISTORY:  Gait abnormality  Hypothyroidism  Colon cancer: 2000  COPD (chronic obstructive pulmonary disease)  Age-related incipient cataract of both eyes: 2005  H/O colectomy: 2000      FAMILY HISTORY:  No pertinent family history in first degree relatives      SOCIAL HISTORY:  Smoking: [ ] Never Smoked [ ] Former Smoker (__ packs x ___ years) [ ] Current Smoker  (__ packs x ___ years)  Substance Use: [ ] Never Used [ ] Used ____  EtOH Use:  Marital Status: [ ] Single [ ]  [ ]  [ ]   Sexual History:   Occupation:  Recent Travel:  Country of Birth:  Advance Directives:    Allergies    Avelox (Urticaria; Rash)  Ventolin (Angioedema)    Intolerances        HOME MEDICATIONS:    REVIEW OF SYSTEMS:  Constitutional: [ ] fevers [ ] chills [ ] weight loss [ ] weight gain  HEENT: [ ] dry eyes [ ] eye irritation [ ] postnasal drip [ ] nasal congestion  CV: [ ] chest pain [ ] orthopnea [ ] palpitations [ ] murmur  Resp: [ ] cough [ ] shortness of breath [ ] dyspnea [ ] wheezing [ ] sputum [ ] hemoptysis  GI: [ ] nausea [ ] vomiting [ ] diarrhea [ ] constipation [ ] abd pain [ ] dysphagia   : [ ] dysuria [ ] nocturia [ ] hematuria [ ] increased urinary frequency  Musculoskeletal: [ ] back pain [ ] myalgias [ ] arthralgias [ ] fracture  Skin: [ ] rash [ ] itch  Neurological: [ ] headache [ ] dizziness [ ] syncope [ ] weakness [ ] numbness  Psychiatric: [ ] anxiety [ ] depression  Endocrine: [ ] diabetes [ ] thyroid problem  Hematologic/Lymphatic: [ ] anemia [ ] bleeding problem  Allergic/Immunologic: [ ] itchy eyes [ ] nasal discharge [ ] hives [ ] angioedema  [ ] All other systems negative  [ ] Unable to assess ROS because ________    OBJECTIVE:  ICU Vital Signs Last 24 Hrs  T(C): 36.4 (03 Mar 2018 07:15), Max: 36.7 (02 Mar 2018 12:03)  T(F): 97.6 (03 Mar 2018 07:15), Max: 98.1 (02 Mar 2018 12:03)  HR: 100 (03 Mar 2018 07:15) (82 - 118)  BP: 154/64 (03 Mar 2018 07:15) (111/70 - 166/77)  BP(mean): --  ABP: --  ABP(mean): --  RR: 20 (03 Mar 2018 07:15) (18 - 20)  SpO2: 93% (03 Mar 2018 07:15) (92% - 99%)        03-02 @ 07:01  -  03-03 @ 07:00  --------------------------------------------------------  IN: 620 mL / OUT: 1300 mL / NET: -680 mL      CAPILLARY BLOOD GLUCOSE          PHYSICAL EXAM:  General:   HEENT:   Lymph Nodes:  Neck:   Respiratory:   Cardiovascular:   Abdomen:   Extremities:   Skin:   Neurological:  Psychiatry:    LINES:     HOSPITAL MEDICATIONS:  MEDICATIONS  (STANDING):  ALBUTerol/ipratropium for Nebulization 3 milliLiter(s) Nebulizer every 6 hours  ALBUTerol/ipratropium for Nebulization. 3 milliLiter(s) Nebulizer once  azithromycin  IVPB 500 milliGRAM(s) IV Intermittent every 24 hours  azithromycin  IVPB      buDESOnide   0.25 milliGRAM(s) Respule 0.25 milliGRAM(s) Inhalation two times a day  cefepime  IVPB 1000 milliGRAM(s) IV Intermittent every 12 hours  cefepime  IVPB      cholecalciferol 1000 Unit(s) Oral two times a day  diltiazem    Tablet 30 milliGRAM(s) Oral four times a day  docusate sodium 100 milliGRAM(s) Oral two times a day  guaiFENesin  milliGRAM(s) Oral every 12 hours  heparin  Injectable 5000 Unit(s) SubCutaneous every 12 hours  lidocaine   Patch 1 Patch Transdermal daily  methylPREDNISolone sodium succinate Injectable 20 milliGRAM(s) IV Push every 8 hours  polyethylene glycol 3350 17 Gram(s) Oral daily  senna 2 Tablet(s) Oral at bedtime  tiotropium 18 MICROgram(s) Capsule 1 Capsule(s) Inhalation daily  vancomycin  IVPB 750 milliGRAM(s) IV Intermittent every 24 hours    MEDICATIONS  (PRN):  acetaminophen   Tablet. 650 milliGRAM(s) Oral every 6 hours PRN Moderate Pain (4 - 6)  guaiFENesin    Syrup 200 milliGRAM(s) Oral every 6 hours PRN Cough  sodium chloride 0.65% Nasal 1 Spray(s) Both Nostrils four times a day PRN Nasal Congestion      LABS:                        10.7   20.5  )-----------( 293      ( 03 Mar 2018 06:51 )             35.2     Hgb Trend: 10.7<--, 11.1<--, 10.6<--, 11.4<--, 10.5<--  03-03    138  |  94<L>  |  52<H>  ----------------------------<  201<H>  5.1   |  35<H>  |  0.93    Ca    10.0      03 Mar 2018 06:51  Mg     2.4     03-02      Creatinine Trend: 0.93<--, 0.90<--, 0.85<--, 0.76<--, 0.74<--, 1.13<--      Arterial Blood Gas:  03-03 @ 06:27  7.28/85/167/39/99/10.0  ABG lactate: --  Arterial Blood Gas:  03-02 @ 02:20  7.36/68/92/37/97/10.1  ABG lactate: --        MICROBIOLOGY:     RADIOLOGY:  [ ] Reviewed and interpreted by me    EKG: CHIEF COMPLAINT: shortness of breath    HPI: 89/F with COPD on home oxygen admitted 2/23 with hypoxic hypercarbic respiratory failure in the setting of influenza infection and was treated with tamiflu as well as vancomycin, cefepime, azithromycin also noted to be in new afib/aflutter rate controlled with cardizem after was on cardizem gtts with questionable heart failure. Patient has been diuresed with lasix and required BiPAP this admission. Patient currently reports no pain however endorses shortness of breath. She is oriented to person, place, time and is not sure if she would want to be intubated. Patient's  is also admitted to [facility] and she has no children or family around.     PAST MEDICAL & SURGICAL HISTORY:  Gait abnormality  Hypothyroidism  Colon cancer: 2000  COPD (chronic obstructive pulmonary disease)  Age-related incipient cataract of both eyes: 2005  H/O colectomy: 2000      FAMILY HISTORY:  No pertinent family history in first degree relatives      SOCIAL HISTORY:  Smoking: [ ] Never Smoked [ ] Former Smoker (__ packs x ___ years) [ ] Current Smoker  (__ packs x ___ years)  Substance Use: [ ] Never Used [ ] Used ____  EtOH Use:  Marital Status: [ ] Single [x]  [ ]  [ ]   Sexual History:   Occupation:  Recent Travel:  Country of Birth:  Advance Directives:    Allergies    Avelox (Urticaria; Rash)  Ventolin (Angioedema)    Intolerances        HOME MEDICATIONS:    REVIEW OF SYSTEMS:  Constitutional: [ ] fevers [ ] chills [ ] weight loss [ ] weight gain  HEENT: [ ] dry eyes [ ] eye irritation [ ] postnasal drip [ ] nasal congestion  CV: [ ] chest pain [ ] orthopnea [ ] palpitations [ ] murmur  Resp: [ ] cough [x] shortness of breath [ ] dyspnea [ ] wheezing [ ] sputum [ ] hemoptysis  GI: [ ] nausea [ ] vomiting [ ] diarrhea [ ] constipation [ ] abd pain [ ] dysphagia   : [ ] dysuria [ ] nocturia [ ] hematuria [ ] increased urinary frequency  Musculoskeletal: [ ] back pain [ ] myalgias [ ] arthralgias [ ] fracture  Skin: [ ] rash [ ] itch  Neurological: [ ] headache [ ] dizziness [ ] syncope [ ] weakness [ ] numbness  Psychiatric: [ ] anxiety [ ] depression  Endocrine: [ ] diabetes [ ] thyroid problem  Hematologic/Lymphatic: [ ] anemia [ ] bleeding problem  Allergic/Immunologic: [ ] itchy eyes [ ] nasal discharge [ ] hives [ ] angioedema  [x] All other systems negative  [ ] Unable to assess ROS because ________    OBJECTIVE:  ICU Vital Signs Last 24 Hrs  T(C): 36.4 (03 Mar 2018 07:15), Max: 36.7 (02 Mar 2018 12:03)  T(F): 97.6 (03 Mar 2018 07:15), Max: 98.1 (02 Mar 2018 12:03)  HR: 100 (03 Mar 2018 07:15) (82 - 118)  BP: 154/64 (03 Mar 2018 07:15) (111/70 - 166/77)  BP(mean): --  ABP: --  ABP(mean): --  RR: 20 (03 Mar 2018 07:15) (18 - 20)  SpO2: 93% (03 Mar 2018 07:15) (92% - 99%)        03-02 @ 07:01  -  03-03 @ 07:00  --------------------------------------------------------  IN: 620 mL / OUT: 1300 mL / NET: -680 mL      CAPILLARY BLOOD GLUCOSE          PHYSICAL EXAM:  General: tachypneic, with accessory muscle use  HEENT: dry oral mucosa  Neck: supple  Respiratory: rhonchi auscultated bilaterally  Cardiovascular: S1, S2, tachycardic   Abdomen:   Extremities:   Skin:   Neurological:  Psychiatry:    LINES:     HOSPITAL MEDICATIONS:  MEDICATIONS  (STANDING):  ALBUTerol/ipratropium for Nebulization 3 milliLiter(s) Nebulizer every 6 hours  ALBUTerol/ipratropium for Nebulization. 3 milliLiter(s) Nebulizer once  azithromycin  IVPB 500 milliGRAM(s) IV Intermittent every 24 hours  azithromycin  IVPB      buDESOnide   0.25 milliGRAM(s) Respule 0.25 milliGRAM(s) Inhalation two times a day  cefepime  IVPB 1000 milliGRAM(s) IV Intermittent every 12 hours  cefepime  IVPB      cholecalciferol 1000 Unit(s) Oral two times a day  diltiazem    Tablet 30 milliGRAM(s) Oral four times a day  docusate sodium 100 milliGRAM(s) Oral two times a day  guaiFENesin  milliGRAM(s) Oral every 12 hours  heparin  Injectable 5000 Unit(s) SubCutaneous every 12 hours  lidocaine   Patch 1 Patch Transdermal daily  methylPREDNISolone sodium succinate Injectable 20 milliGRAM(s) IV Push every 8 hours  polyethylene glycol 3350 17 Gram(s) Oral daily  senna 2 Tablet(s) Oral at bedtime  tiotropium 18 MICROgram(s) Capsule 1 Capsule(s) Inhalation daily  vancomycin  IVPB 750 milliGRAM(s) IV Intermittent every 24 hours    MEDICATIONS  (PRN):  acetaminophen   Tablet. 650 milliGRAM(s) Oral every 6 hours PRN Moderate Pain (4 - 6)  guaiFENesin    Syrup 200 milliGRAM(s) Oral every 6 hours PRN Cough  sodium chloride 0.65% Nasal 1 Spray(s) Both Nostrils four times a day PRN Nasal Congestion      LABS:                        10.7   20.5  )-----------( 293      ( 03 Mar 2018 06:51 )             35.2     Hgb Trend: 10.7<--, 11.1<--, 10.6<--, 11.4<--, 10.5<--  03-03    138  |  94<L>  |  52<H>  ----------------------------<  201<H>  5.1   |  35<H>  |  0.93    Ca    10.0      03 Mar 2018 06:51  Mg     2.4     03-02      Creatinine Trend: 0.93<--, 0.90<--, 0.85<--, 0.76<--, 0.74<--, 1.13<--      Arterial Blood Gas:  03-03 @ 06:27  7.28/85/167/39/99/10.0  ABG lactate: --  Arterial Blood Gas:  03-02 @ 02:20  7.36/68/92/37/97/10.1  ABG lactate: --        MICROBIOLOGY:     RADIOLOGY:  [ ] Reviewed and interpreted by me    EKG: CHIEF COMPLAINT: shortness of breath    HPI: 89/F with COPD on home oxygen admitted 2/23 with hypoxic hypercarbic respiratory failure in the setting of influenza infection and was treated with tamiflu as well as vancomycin, cefepime, azithromycin also noted to be in new afib/aflutter rate controlled with cardizem after was on cardizem gtts with questionable heart failure. Patient has been diuresed with lasix and required BiPAP this admission. Patient currently reports no pain however endorses shortness of breath. She is oriented to person, place, time and is not sure if she would want to be intubated. Patient's  is also admitted to [facility] and she has no children or family around.     PAST MEDICAL & SURGICAL HISTORY:  Gait abnormality  Hypothyroidism  Colon cancer: 2000  COPD (chronic obstructive pulmonary disease)  Age-related incipient cataract of both eyes: 2005  H/O colectomy: 2000      FAMILY HISTORY:  No pertinent family history in first degree relatives      SOCIAL HISTORY:  Smoking: [ ] Never Smoked [ ] Former Smoker (__ packs x ___ years) [ ] Current Smoker  (__ packs x ___ years)  Substance Use: [ ] Never Used [ ] Used ____  EtOH Use:  Marital Status: [ ] Single [x]  [ ]  [ ]   Sexual History:   Occupation:  Recent Travel:  Country of Birth:  Advance Directives:    Allergies    Avelox (Urticaria; Rash)  Ventolin (Angioedema)    Intolerances        HOME MEDICATIONS:    REVIEW OF SYSTEMS:  Constitutional: [ ] fevers [ ] chills [ ] weight loss [ ] weight gain  HEENT: [ ] dry eyes [ ] eye irritation [ ] postnasal drip [ ] nasal congestion  CV: [ ] chest pain [ ] orthopnea [ ] palpitations [ ] murmur  Resp: [ ] cough [x] shortness of breath [ ] dyspnea [ ] wheezing [ ] sputum [ ] hemoptysis  GI: [ ] nausea [ ] vomiting [ ] diarrhea [ ] constipation [ ] abd pain [ ] dysphagia   : [ ] dysuria [ ] nocturia [ ] hematuria [ ] increased urinary frequency  Musculoskeletal: [ ] back pain [ ] myalgias [ ] arthralgias [ ] fracture  Skin: [ ] rash [ ] itch  Neurological: [ ] headache [ ] dizziness [ ] syncope [ ] weakness [ ] numbness  Psychiatric: [ ] anxiety [ ] depression  Endocrine: [ ] diabetes [ ] thyroid problem  Hematologic/Lymphatic: [ ] anemia [ ] bleeding problem  Allergic/Immunologic: [ ] itchy eyes [ ] nasal discharge [ ] hives [ ] angioedema  [x] All other systems negative  [ ] Unable to assess ROS because ________    OBJECTIVE:  ICU Vital Signs Last 24 Hrs  T(C): 36.4 (03 Mar 2018 07:15), Max: 36.7 (02 Mar 2018 12:03)  T(F): 97.6 (03 Mar 2018 07:15), Max: 98.1 (02 Mar 2018 12:03)  HR: 100 (03 Mar 2018 07:15) (82 - 118)  BP: 154/64 (03 Mar 2018 07:15) (111/70 - 166/77)  BP(mean): --  ABP: --  ABP(mean): --  RR: 20 (03 Mar 2018 07:15) (18 - 20)  SpO2: 93% (03 Mar 2018 07:15) (92% - 99%)        03-02 @ 07:01  -  03-03 @ 07:00  --------------------------------------------------------  IN: 620 mL / OUT: 1300 mL / NET: -680 mL      CAPILLARY BLOOD GLUCOSE          PHYSICAL EXAM:  General: tachypneic, with accessory muscle use  HEENT: dry oral mucosa  Neck: supple  Respiratory: rhonchi auscultated bilaterally  Cardiovascular: S1, S2, tachycardic   Abdomen: soft, NT, ND  Extremities: no LE edema  Skin: cyanotic toes  Neurological: AAO x 3  Psychiatry: anxious affect    LINES:     HOSPITAL MEDICATIONS:  MEDICATIONS  (STANDING):  ALBUTerol/ipratropium for Nebulization 3 milliLiter(s) Nebulizer every 6 hours  ALBUTerol/ipratropium for Nebulization. 3 milliLiter(s) Nebulizer once  azithromycin  IVPB 500 milliGRAM(s) IV Intermittent every 24 hours  azithromycin  IVPB      buDESOnide   0.25 milliGRAM(s) Respule 0.25 milliGRAM(s) Inhalation two times a day  cefepime  IVPB 1000 milliGRAM(s) IV Intermittent every 12 hours  cefepime  IVPB      cholecalciferol 1000 Unit(s) Oral two times a day  diltiazem    Tablet 30 milliGRAM(s) Oral four times a day  docusate sodium 100 milliGRAM(s) Oral two times a day  guaiFENesin  milliGRAM(s) Oral every 12 hours  heparin  Injectable 5000 Unit(s) SubCutaneous every 12 hours  lidocaine   Patch 1 Patch Transdermal daily  methylPREDNISolone sodium succinate Injectable 20 milliGRAM(s) IV Push every 8 hours  polyethylene glycol 3350 17 Gram(s) Oral daily  senna 2 Tablet(s) Oral at bedtime  tiotropium 18 MICROgram(s) Capsule 1 Capsule(s) Inhalation daily  vancomycin  IVPB 750 milliGRAM(s) IV Intermittent every 24 hours    MEDICATIONS  (PRN):  acetaminophen   Tablet. 650 milliGRAM(s) Oral every 6 hours PRN Moderate Pain (4 - 6)  guaiFENesin    Syrup 200 milliGRAM(s) Oral every 6 hours PRN Cough  sodium chloride 0.65% Nasal 1 Spray(s) Both Nostrils four times a day PRN Nasal Congestion      LABS:                        10.7   20.5  )-----------( 293      ( 03 Mar 2018 06:51 )             35.2     Hgb Trend: 10.7<--, 11.1<--, 10.6<--, 11.4<--, 10.5<--  03-03    138  |  94<L>  |  52<H>  ----------------------------<  201<H>  5.1   |  35<H>  |  0.93    Ca    10.0      03 Mar 2018 06:51  Mg     2.4     03-02      Creatinine Trend: 0.93<--, 0.90<--, 0.85<--, 0.76<--, 0.74<--, 1.13<--      Arterial Blood Gas:  03-03 @ 06:27  7.28/85/167/39/99/10.0  ABG lactate: --  Arterial Blood Gas:  03-02 @ 02:20  7.36/68/92/37/97/10.1  ABG lactate: --        MICROBIOLOGY:     RADIOLOGY:  [ ] Reviewed and interpreted by me    CXR: worsening RLL infiltrate

## 2018-03-03 NOTE — CHART NOTE - NSCHARTNOTEFT_GEN_A_CORE
Notified by RN that patient noted to be in  SOB. patient noted to be in worsening SOB, o3sat 92 to 93% on O2Nc 3l/mt. Seen and examined the patient, patient tachypneic, denies HA, palpitations, CP, visual changes, N/V/D, dysuria.     Vital Signs Last 24 Hrs  T(C): 36.7 (03 Mar 2018 05:09), Max: 36.7 (02 Mar 2018 12:03)  T(F): 98 (03 Mar 2018 05:09), Max: 98.1 (02 Mar 2018 12:03)  HR: 102 (03 Mar 2018 05:09) (82 - 118)  BP: 166/77 (03 Mar 2018 05:09) (111/70 - 166/77)  BP(mean): --  RR: 18 (03 Mar 2018 05:09) (18 - 20)  SpO2: 99% (02 Mar 2018 21:07) (92% - 99%)      Labs:                          11.1   17.2  )-----------( 274      ( 02 Mar 2018 07:05 )             34.4     03-02    140  |  98  |  48<H>  ----------------------------<  201<H>  5.1   |  34<H>  |  0.90    Ca    10.1      02 Mar 2018 07:05  Mg     2.4     03-02      ABG - ( 02 Mar 2018 02:20 )  pH: 7.36  /  pCO2: 68    /  pO2: 92    / HCO3: 37    / Base Excess: 10.1  /  SaO2: 97          Radiology: < from: Xray Chest 1 View- PORTABLE-Urgent (03.02.18 @ 03:17) >    Chin overlies the right apex.  The study is very limited secondary to patient positioning.  There may be small bilateral pleural effusions.  No left sided pneumothorax.  Degenerative changes of spine are noted.  The cardiac silhouette isnot accurately assessed in this projection.    MEDICATIONS  (STANDING):  ALBUTerol/ipratropium for Nebulization 3 milliLiter(s) Nebulizer every 6 hours  azithromycin  IVPB 500 milliGRAM(s) IV Intermittent every 24 hours  azithromycin  IVPB      buDESOnide   0.25 milliGRAM(s) Respule 0.25 milliGRAM(s) Inhalation two times a day  cefepime  IVPB 1000 milliGRAM(s) IV Intermittent every 12 hours  cefepime  IVPB      cholecalciferol 1000 Unit(s) Oral two times a day  diltiazem    Tablet 30 milliGRAM(s) Oral four times a day  docusate sodium 100 milliGRAM(s) Oral two times a day  guaiFENesin  milliGRAM(s) Oral every 12 hours  heparin  Injectable 5000 Unit(s) SubCutaneous every 12 hours  lidocaine   Patch 1 Patch Transdermal daily  methylPREDNISolone sodium succinate Injectable 20 milliGRAM(s) IV Push every 8 hours  polyethylene glycol 3350 17 Gram(s) Oral daily  senna 2 Tablet(s) Oral at bedtime  tiotropium 18 MICROgram(s) Capsule 1 Capsule(s) Inhalation daily  vancomycin  IVPB 750 milliGRAM(s) IV Intermittent every 24 hours    MEDICATIONS  (PRN):  acetaminophen   Tablet. 650 milliGRAM(s) Oral every 6 hours PRN Moderate Pain (4 - 6)  guaiFENesin    Syrup 200 milliGRAM(s) Oral every 6 hours PRN Cough  sodium chloride 0.65% Nasal 1 Spray(s) Both Nostrils four times a day PRN Nasal Congestion      Physical Exam:  General: Sitting in bed, appears dyspneic, tachypneic  Neurology: A&Ox3, nonfocal,   Respiratory: Expiratory wheezing, crackles on b/l LL  CV: RRR, S1S2, no murmur  Abdominal: Soft, NT, ND no palpable mass  MSK: No edema, + peripheral pulses, FROM all 4 extremity    Assessment & Plan:    89yo F PMH of hypothyroidism, COPD on home O2, CHF (No echo results available in the chart) admitted with Influenza positive, ANNE-MARIE, treated with Cardizem IV in the ED, and IV hydration. patient on PO Cardizem now. PATIENT off BIPAP now, last ABG with pco2 compensated. now patient with ANNE-MARIE to Aflutter, and SOB. Since admission, patient has been fluctuating from afib to aflutter. Aflutter likely from Flu, In setting of patient in and out of Afib consider cardiology consult. Patient not on any anticoagulation.  Patient with some SOB at her baseline, BIPAP off now, patient alert,  refused to be on BIPAP, patient with new AF/RVR/Flutter on admission, not on anticoagulation, converted to SR.    ABG stat  BIPAP for dyspnea relief if patient agrees  Continue Nebulization  CXR stat  Lasix if dyspnea not getting better  TTE since patient had no Echo in the chart  Continue Cardizem PO  Continue Tamiflu and ABX for PNA  ?CTA to R/O PE will discuss with Attending  Will continue to monitor  Will update with primary team    Bhavesh STALEY BC  Spectra# [phone]                                                  Follow up with Attending in AM. Notified by RN that patient noted to be in  SOB. patient noted to be in worsening SOB, o3sat 92 to 93% on O2Nc 3l/mt. Seen and examined the patient, patient tachypneic, denies HA, palpitations, CP, visual changes, N/V/D, dysuria.     Vital Signs Last 24 Hrs  T(C): 36.7 (03 Mar 2018 05:09), Max: 36.7 (02 Mar 2018 12:03)  T(F): 98 (03 Mar 2018 05:09), Max: 98.1 (02 Mar 2018 12:03)  HR: 102 (03 Mar 2018 05:09) (82 - 118)  BP: 166/77 (03 Mar 2018 05:09) (111/70 - 166/77)  BP(mean): --  RR: 18 (03 Mar 2018 05:09) (18 - 20)  SpO2: 99% (02 Mar 2018 21:07) (92% - 99%)      Labs:                          11.1   17.2  )-----------( 274      ( 02 Mar 2018 07:05 )             34.4     03-02    140  |  98  |  48<H>  ----------------------------<  201<H>  5.1   |  34<H>  |  0.90    Ca    10.1      02 Mar 2018 07:05  Mg     2.4     03-02      ABG - ( 02 Mar 2018 02:20 )  pH: 7.36  /  pCO2: 68    /  pO2: 92    / HCO3: 37    / Base Excess: 10.1  /  SaO2: 97          Radiology: < from: Xray Chest 1 View- PORTABLE-Urgent (03.02.18 @ 03:17) >    Chin overlies the right apex.  The study is very limited secondary to patient positioning.  There may be small bilateral pleural effusions.  No left sided pneumothorax.  Degenerative changes of spine are noted.  The cardiac silhouette isnot accurately assessed in this projection.    MEDICATIONS  (STANDING):  ALBUTerol/ipratropium for Nebulization 3 milliLiter(s) Nebulizer every 6 hours  azithromycin  IVPB 500 milliGRAM(s) IV Intermittent every 24 hours  azithromycin  IVPB      buDESOnide   0.25 milliGRAM(s) Respule 0.25 milliGRAM(s) Inhalation two times a day  cefepime  IVPB 1000 milliGRAM(s) IV Intermittent every 12 hours  cefepime  IVPB      cholecalciferol 1000 Unit(s) Oral two times a day  diltiazem    Tablet 30 milliGRAM(s) Oral four times a day  docusate sodium 100 milliGRAM(s) Oral two times a day  guaiFENesin  milliGRAM(s) Oral every 12 hours  heparin  Injectable 5000 Unit(s) SubCutaneous every 12 hours  lidocaine   Patch 1 Patch Transdermal daily  methylPREDNISolone sodium succinate Injectable 20 milliGRAM(s) IV Push every 8 hours  polyethylene glycol 3350 17 Gram(s) Oral daily  senna 2 Tablet(s) Oral at bedtime  tiotropium 18 MICROgram(s) Capsule 1 Capsule(s) Inhalation daily  vancomycin  IVPB 750 milliGRAM(s) IV Intermittent every 24 hours    MEDICATIONS  (PRN):  acetaminophen   Tablet. 650 milliGRAM(s) Oral every 6 hours PRN Moderate Pain (4 - 6)  guaiFENesin    Syrup 200 milliGRAM(s) Oral every 6 hours PRN Cough  sodium chloride 0.65% Nasal 1 Spray(s) Both Nostrils four times a day PRN Nasal Congestion      Physical Exam:  General: Sitting in bed, appears dyspneic, tachypneic  Neurology: A&Ox3, nonfocal,   Respiratory: Expiratory wheezing, crackles on b/l LL  CV: RRR, S1S2, no murmur  Abdominal: Soft, NT, ND no palpable mass  MSK: No edema, + peripheral pulses, FROM all 4 extremity    Assessment & Plan:    89yo F PMH of hypothyroidism, COPD on home O2, CHF (No echo results available in the chart) admitted with Influenza positive, ANNE-MARIE, treated with Cardizem IV in the ED, and IV hydration. patient on PO Cardizem now. PATIENT off BIPAP now, last ABG with pco2 compensated. now patient with ANNE-MARIE to Aflutter, and SOB. Since admission, patient has been fluctuating from afib to aflutter. Aflutter likely from Flu, In setting of patient in and out of Afib consider cardiology consult. Patient not on any anticoagulation.  Patient with some SOB at her baseline, BIPAP off now, patient alert,  refused to be on BIPAP, patient with new AF/RVR/Flutter on admission, not on anticoagulation, converted to SR.    ABG stat  BIPAP for dyspnea relief if patient agrees  Continue Nebulization  CXR stat  Lasix if dyspnea not getting better  TTE since patient had no Echo in the chart  Continue Cardizem PO  Continue Tamiflu and ABX for PNA  ?CTA to R/O PE will discuss with Attending  ? No recommendation for AC from cardiology ? due to age  Will continue to monitor  Will update with primary team    Bhavesh Bryan [facility]  Spectra# [phone]                                                  Follow up with Attending in AM.

## 2018-03-03 NOTE — PROGRESS NOTE ADULT - SUBJECTIVE AND OBJECTIVE BOX
Patient is a 89y old  Female who presents with a chief complaint of COUGH,SOB (24 Feb 2018 03:47)      HPI:    MEDICATIONS  (STANDING):  ALBUTerol/ipratropium for Nebulization 3 milliLiter(s) Nebulizer every 6 hours  azithromycin  IVPB 500 milliGRAM(s) IV Intermittent every 24 hours  azithromycin  IVPB      buDESOnide   0.25 milliGRAM(s) Respule 0.25 milliGRAM(s) Inhalation two times a day  cefepime  IVPB 1000 milliGRAM(s) IV Intermittent every 12 hours  cefepime  IVPB      cholecalciferol 1000 Unit(s) Oral two times a day  docusate sodium 100 milliGRAM(s) Oral two times a day  furosemide   Injectable 40 milliGRAM(s) IV Push every 12 hours  guaiFENesin  milliGRAM(s) Oral every 12 hours  heparin  Injectable 5000 Unit(s) SubCutaneous every 12 hours  lidocaine   Patch 1 Patch Transdermal daily  methylPREDNISolone sodium succinate Injectable 20 milliGRAM(s) IV Push every 8 hours  polyethylene glycol 3350 17 Gram(s) Oral daily  senna 2 Tablet(s) Oral at bedtime  tiotropium 18 MICROgram(s) Capsule 1 Capsule(s) Inhalation daily  vancomycin  IVPB 750 milliGRAM(s) IV Intermittent every 24 hours    MEDICATIONS  (PRN):  acetaminophen   Tablet. 650 milliGRAM(s) Oral every 6 hours PRN Moderate Pain (4 - 6)  guaiFENesin    Syrup 200 milliGRAM(s) Oral every 6 hours PRN Cough  sodium chloride 0.65% Nasal 1 Spray(s) Both Nostrils four times a day PRN Nasal Congestion      Allergies    Avelox (Urticaria; Rash)  Ventolin (Angioedema)    Intolerances        REVIEW OF SYSTEM:  CONSTITUTIONAL: No fever, No change in weight, No fatigue  HEAD: No headache, No dizziness, No recent trauma  EYES: No eye pain, No visual disturbances, No discharge  ENT:  No difficulty hearing, No tinnitus, No vertigo, No sinus pain, No throat pain  NECK: No pain, No stiffness  BREASTS: No pain, No masses, No nipple discharge  RESPIRATORY: No cough, No wheezing, No chills, No hemoptysis, No shortness of breath at rest or exertional shortness of breath  CARDIOVASCULAR: No chest pain, No palpitations, No dizziness, No CHF, No arrhythmia, No cardiomegaly, No leg swelling  GASTROINTESTINAL: No abdominal, No epigastric pain. No nausea, No vomiting, No hematemesis, No diarrhea, No constipation. No melena, No hematochezia. No GERD  GENITOURINARY: No dysuria, No frequency, No hematuria, No incontinence, No nocturia, No hesitancy,  SKIN: No itching, No burning, No rashes, No lesions   LYMPH NODES: No history of enlarged glands  ENDOCRINE: No heat or cold intolerance, No hair loss. No osteoporosis, No thyroid disease  MUSCULOSKELETAL: No joint pain or swelling, No muscle, back, or extremity pain  PSYCHIATRIC: No depression, No anxiety, No mood swings, No difficulty sleeping  HEME/LYMPH: No easy bruising, No anticoagulants, No bleeding disorder, No bleeding gums  ALLERGY AND IMMUNOLOGIC: No hives, No eczema  NEUROLOGICAL: No memory loss, No loss of strength, No numbness, No tremors        VITALS:   T(C): 36.4 (03-03-18 @ 07:15), Max: 36.7 (03-02-18 @ 21:07)  HR: 113 (03-03-18 @ 12:00) (82 - 113)  BP: 129/61 (03-03-18 @ 12:00) (119/88 - 166/77)  RR: 22 (03-03-18 @ 07:30) (18 - 22)  SpO2: 96% (03-03-18 @ 09:03) (93% - 99%)  Wt(kg): --    03-02 @ 07:01  -  03-03 @ 07:00  --------------------------------------------------------  IN: 620 mL / OUT: 1300 mL / NET: -680 mL        PHYSICAL EXAM:  GENERAL: NAD, well nourished and conversant  HEAD:  Atraumatic  EYES: EOM, PERRLA, conjunctiva pink and sclera white  ENT: No tonsillar erythema, exudates, or enlargement, moist mucous membranes, good dentition, no lesions  NECK: Supple, No JVD, normal thyroid, carotids with normal upstrokes and no bruits  CHEST/LUNG: Clear to auscultation bilaterally, No rales, rhonchi, wheezing, or rubs  HEART: Regular rate and rhythm, No murmurs, rubs, or gallops  ABDOMEN: Soft, nondistended, no masses, guarding, tenderness or rebound, bowel sounds present  EXTREMITIES:  2+ Peripheral Pulses, No clubbing, cyanosis, or edema. No arthritis of shoulders, elbows, hands, hips, knees, ankles, or feet. No DJD C spine, T spine, or L/S spine  LYMPH: No lymphadenopathy noted  SKIN: No rashes or lesions  NERVOUS SYSTEM:  Alert & Oriented X3, normal cognitive function. Motor Strength 5/5 right upper and right lower.  5/5 left upper and left lower extremities, DTRs 2+ intact and symmetric    LABS:                          10.7   20.5  )-----------( 293      ( 03 Mar 2018 06:51 )             35.2     03-03    138  |  94<L>  |  52<H>  ----------------------------<  201<H>  5.1   |  35<H>  |  0.93  03-02    140  |  98  |  48<H>  ----------------------------<  201<H>  5.1   |  34<H>  |  0.90  03-01    140  |  99  |  38<H>  ----------------------------<  206<H>  4.6   |  35<H>  |  0.85    Ca    10.0      03 Mar 2018 06:51  Ca    10.1      02 Mar 2018 07:05  Ca    9.9      01 Mar 2018 05:40  Mg     2.4     03-02      CAPILLARY BLOOD GLUCOSE          RADIOLOGY & ADDITIONAL TESTS:      Consultant(s):    Care Discussed with Consultants/Other Providers [ ] YES  [ ] NO Patient is a 89y old  Female who presents with a chief complaint of COUGH,SOB (24 Feb 2018 03:47)      HPI: Patient with extreme sob and congestion.  Fluid diuresing nicely Appreciate pulmonary and MICU input  .  Will continue bid lasix.  Patient admitted with the flud advanced ot pnuemonia  Conitnue if anivioif    MEDICATIONS  (STANDING):  ALBUTerol/ipratropium for Nebulization 3 milliLiter(s) Nebulizer every 6 hours  azithromycin  IVPB 500 milliGRAM(s) IV Intermittent every 24 hours  azithromycin  IVPB      buDESOnide   0.25 milliGRAM(s) Respule 0.25 milliGRAM(s) Inhalation two times a day  cefepime  IVPB 1000 milliGRAM(s) IV Intermittent every 12 hours  cefepime  IVPB      cholecalciferol 1000 Unit(s) Oral two times a day  docusate sodium 100 milliGRAM(s) Oral two times a day  furosemide   Injectable 40 milliGRAM(s) IV Push every 12 hours  guaiFENesin  milliGRAM(s) Oral every 12 hours  heparin  Injectable 5000 Unit(s) SubCutaneous every 12 hours  lidocaine   Patch 1 Patch Transdermal daily  methylPREDNISolone sodium succinate Injectable 20 milliGRAM(s) IV Push every 8 hours  polyethylene glycol 3350 17 Gram(s) Oral daily  senna 2 Tablet(s) Oral at bedtime  tiotropium 18 MICROgram(s) Capsule 1 Capsule(s) Inhalation daily  vancomycin  IVPB 750 milliGRAM(s) IV Intermittent every 24 hours    MEDICATIONS  (PRN):  acetaminophen   Tablet. 650 milliGRAM(s) Oral every 6 hours PRN Moderate Pain (4 - 6)  guaiFENesin    Syrup 200 milliGRAM(s) Oral every 6 hours PRN Cough  sodium chloride 0.65% Nasal 1 Spray(s) Both Nostrils four times a day PRN Nasal Congestion      Allergies    Avelox (Urticaria; Rash)  Ventolin (Angioedema)    Intolerances    VITALS:   T(C): 36.4 (03-03-18 @ 07:15), Max: 36.7 (03-02-18 @ 21:07)  HR: 113 (03-03-18 @ 12:00) (82 - 113)  BP: 129/61 (03-03-18 @ 12:00) (119/88 - 166/77)  RR: 22 (03-03-18 @ 07:30) (18 - 22)  SpO2: 96% (03-03-18 @ 09:03) (93% - 99%)  Wt(kg): --    03-02 @ 07:01  -  03-03 @ 07:00  --------------------------------------------------------  IN: 620 mL / OUT: 1300 mL / NET: -680 mL        PHYSICAL EXAM:  GENERAL:  thin chronically ill severe lung disease  HEAD:  Atraumatic  EYES: EOM, PERRLA, conjunctiva pink and sclera white  CHEST/LUNG: Clear to auscultation bilaterally, No rales, rhonchi, wheezing, or rubs  HEART: Regular rate and rhythm, No murmurs, rubs, or gallops  ABDOMEN: Soft, nondistended, no masses, guarding, tenderness or rebound, bowel sounds present  EXTREMITIES:  2+ Peripheral Pulses, No clubbing, cyanosis, or edema. No arthritis of shoulders, elbows, hands, hips, knees, ankles, or feet. No DJD C spine, T spine, or L/S spine  LYMPH: No lymphadenopathy noted  SKIN: No rashes or lesions  NERVOUS SYSTEM:  Alert & Oriented X3, normal cognitive function. Motor Strength 5/5 right upper and right lower.  5/5 left upper and left lower extremities, DTRs 2+ intact and symmetric    LABS:                          10.7   20.5  )-----------( 293      ( 03 Mar 2018 06:51 )             35.2     03-03    138  |  94<L>  |  52<H>  ----------------------------<  201<H>  5.1   |  35<H>  |  0.93  03-02    140  |  98  |  48<H>  ----------------------------<  201<H>  5.1   |  34<H>  |  0.90  03-01    140  |  99  |  38<H>  ----------------------------<  206<H>  4.6   |  35<H>  |  0.85    Ca    10.0      03 Mar 2018 06:51  Ca    10.1      02 Mar 2018 07:05  Ca    9.9      01 Mar 2018 05:40  Mg     2.4     03-02      CAPILLARY BLOOD GLUCOSE          RADIOLOGY & ADDITIONAL TESTS:      Consultant(s):    Care Discussed with Consultants/Other Providers [ ] YES  [ ] NO

## 2018-03-03 NOTE — CONSULT NOTE ADULT - ASSESSMENT
note to follow 89/F with COPD on home oxygen admitted for influenza with superimposed bacterial pna with hypercarbic respiratory failure not a candidate for MICU admission at this time    #Hypercarbic respiratory failure  -patient with medium sized mask on BiPAP with significant leak and low TV  -exchange for small mask and maintain leak < 30  -recheck ABG  -recommend ongling GOC discussion with patient   -not a candidate for MICU admission at thist [facility]    #CHF  -f/u TTE  -monitor I/O  -daily weight  -c/w telemetry monitoring    Not a candidate for MICU admission at this time; please call with questions.     Rossy Garza PGY 2  [phone]

## 2018-03-04 NOTE — PROGRESS NOTE ADULT - SUBJECTIVE AND OBJECTIVE BOX
Patient is a 89y old  Female who presents with a chief complaint of COUGH,SOB (24 Feb 2018 03:47)      HPI: Patient is more dyspneic and tahypneis having difficulty breathing . She is not getting better even with BIPAP. RRT called to; try and improve breathing.  If she does not arespond then she will need respirator .  Reached out to step josé holguin  in [place] who is her power of .   He says that her aide is the  Pippa's best friend and aide has had discussions of   this topic with her and mrs Bettencourt said that she would    not wish to be on a respirator with no hope of being able to be extubated.  Will make her DNR/DNI while attempting to do everything to improve her health.    MEDICATIONS  (STANDING):  ALBUTerol/ipratropium for Nebulization 3 milliLiter(s) Nebulizer every 6 hours  azithromycin  IVPB 500 milliGRAM(s) IV Intermittent every 24 hours  azithromycin  IVPB      buDESOnide   0.25 milliGRAM(s) Respule 0.25 milliGRAM(s) Inhalation two times a day  cefepime  IVPB 1000 milliGRAM(s) IV Intermittent every 12 hours  cefepime  IVPB      cholecalciferol 1000 Unit(s) Oral two times a day  diltiazem    Tablet 30 milliGRAM(s) Oral every 6 hours  docusate sodium 100 milliGRAM(s) Oral two times a day  furosemide   Injectable 40 milliGRAM(s) IV Push every 12 hours  guaiFENesin  milliGRAM(s) Oral every 12 hours  heparin  Injectable 5000 Unit(s) SubCutaneous every 12 hours  lidocaine   Patch 1 Patch Transdermal daily  methylPREDNISolone sodium succinate Injectable 20 milliGRAM(s) IV Push every 8 hours  polyethylene glycol 3350 17 Gram(s) Oral daily  senna 2 Tablet(s) Oral at bedtime  tiotropium 18 MICROgram(s) Capsule 1 Capsule(s) Inhalation daily    MEDICATIONS  (PRN):  acetaminophen   Tablet. 650 milliGRAM(s) Oral every 6 hours PRN Moderate Pain (4 - 6)  guaiFENesin    Syrup 200 milliGRAM(s) Oral every 6 hours PRN Cough  sodium chloride 0.65% Nasal 1 Spray(s) Both Nostrils four times a day PRN Nasal Congestion      Allergies    Avelox (Urticaria; Rash)  Ventolin (Angioedema)    Intolerances      VITALS:   T(C): 36.8 (03-04-18 @ 16:40), Max: 37.2 (03-04-18 @ 05:20)  HR: 98 (03-04-18 @ 17:01) (57 - 146)  BP: 100/64 (03-04-18 @ 16:40) (100/64 - 147/62)  RR: 21 (03-04-18 @ 16:40) (19 - 27)  SpO2: 96% (03-04-18 @ 17:01) (94% - 100%)  Wt(kg): --    03-03 @ 07:01  -  03-04 @ 07:00  --------------------------------------------------------  IN: 100 mL / OUT: 900 mL / NET: -800 mL    03-04 @ 07:01  -  03-04 @ 18:41  --------------------------------------------------------  IN: 1000 mL / OUT: 1765 mL / NET: -765 mL        PHYSICAL EXAM:  GENERAL: acute respiratory distress  HEAD:  Atraumatic  EYES: EOM, PERRLA, conjunctiva pink and sclera white  ENT: No tonsillar erythema, exudates, or enlargement, moist mucous membranes, good dentition, no lesions  NECK: Supple, No JVD, normal thyroid, carotids with normal upstrokes and no bruits  CHEST/LUNG: scattered rales and rhonchi  HEART: irreg ireg wwwtih RVR  ABDOMEN: Soft, nondistended, no masses, guarding, tenderness or rebound, bowel sounds present  EXTREMITIES:  2+ Peripheral Pulses, No clubbing, cyanosis, or edema. No arthritis of shoulders, elbows, hands, hips, knees, ankles, or feet. No DJD C spine, T spine, or L/S spine  LYMPH: No lymphadenopathy noted  SKIN: No rashes or lesions  NERVOUS SYSTEM:  Alert & Oriented X3, normal cognitive function. Motor Strength 5/5 right upper and right lower.  5/5 left upper and left lower extremities, DTRs 2+ intact and symmetric    LABS:                          10.6   21.2  )-----------( 305      ( 04 Mar 2018 17:04 )             33.8     03-04    136  |  89<L>  |  75<H>  ----------------------------<  284<H>  5.1   |  38<H>  |  1.30  03-04    138  |  91<L>  |  65<H>  ----------------------------<  202<H>  4.6   |  38<H>  |  1.26  03-04    139  |  93<L>  |  61<H>  ----------------------------<  211<H>  5.1   |  39<H>  |  1.13    Ca    9.6      04 Mar 2018 17:04  Ca    10.0      04 Mar 2018 07:00  Ca    9.8      04 Mar 2018 00:22  Mg     2.2     03-04  Mg     2.4     03-02    TPro  7.3  /  Alb  3.5  /  TBili  0.3  /  DBili  x   /  AST  16  /  ALT  63<H>  /  AlkPhos  66  03-04    CAPILLARY BLOOD GLUCOSE      POCT Blood Glucose.: 288 mg/dL (04 Mar 2018 16:10)      RADIOLOGY & ADDITIONAL TESTS:      Consultant(s):    Care Discussed with Consultants/Other Providers [ ] YES  [ ] NO

## 2018-03-04 NOTE — CHART NOTE - NSCHARTNOTEFT_GEN_A_CORE
On telemonitor pt's HR remains 130-150s afib rvr, SBP low 100s, spoke with Attending, will give digoxin for additional rate control.     V/S Vital Signs Last 24 Hrs  T(C): 36.8 (04 Mar 2018 16:40), Max: 37.2 (04 Mar 2018 05:20)  T(F): 98.2 (04 Mar 2018 16:40), Max: 99 (04 Mar 2018 05:20)  HR: 98 (04 Mar 2018 17:01) (57 - 146)  BP: 100/64 (04 Mar 2018 16:40) (100/64 - 147/62)  SpO2: 96% (04 Mar 2018 17:01) (94% - 100%)  RR- 30-40s on bipap 14/8 35% FiO2     - 0.25 IVP x 1 dose digoxin, pt CrCl is reduced at this time, will consider additional loading dose   -continue to monitor on telemetry   -palliative consult pending, recs appreciated   - xopenex over night instead of albuterol   -Monitor Cr   -will f/u ABG overnight and in AM     Madhavi Beck NP [id number]

## 2018-03-04 NOTE — PROGRESS NOTE ADULT - PROBLEM SELECTOR PLAN 1
currently off Bipap will continue to monitor CO@ and O2 sats  continue abx and nebs  chest physical therapy as needed  Patient is DNR/DMI

## 2018-03-04 NOTE — PROVIDER CONTACT NOTE (CHANGE IN STATUS NOTIFICATION) - BACKGROUND
Patient admitted with Acute on Chronic Respiratory Failure with Hypoxia, Pneumonia, Chronic Obstructive Pulmonary Disease, Atrial Flutter, Influenza.

## 2018-03-04 NOTE — CHART NOTE - NSCHARTNOTEFT_GEN_A_CORE
RRT for respiratory distress     89yo F PMH of hypothyroidism, COPD on home O2, HF, recent flu, currently on ceftriaxone and azithromycin and pna, now with worsening resp failure. Pt found to be breathing at 50s, saturating 92% on FI02 of 35%, BP stable, afib on monitor in 120s, poor tidal volumes. Per primary team, there has been multiple discussions regarding GOC but no final determination reached. Pt has said she does not know.  is unable to be reached. Pt has a step son in law in [place].     Pulm note reviewed-poor candidate for intubation. ICU team at bedside-intubation not recommended.     ABG unchanged. Baseline hypercapnia but compensating-likely not contributing to mental status.     RR improved to 30s, patient opening eyes but lethargic. Cxray poor qualtiy.     Primary team to follow up with ethics to determine code status. Pt likely will not be able to come off ventilator.     [place], [place]  [id number]

## 2018-03-04 NOTE — CONSULT NOTE ADULT - SUBJECTIVE AND OBJECTIVE BOX
Clinical summary: Patient is a 89-year-old woman with past medical history of COPD on home oxygen admitted 2/23/18 to [facility] with hypoxic hypercarbic respiratory failure in the setting of influenza infection and was treated with tamiflu as well as IV antibiotics. During hospital stay patient was also noted to be in afib/aflutter rate controlled with cardizem, with concern for heart failure. Patient was diuresed with lasix and required BiPAP. On BiPAP patient reported shortness of breath, but expressed that she was unsure (per chart note)s if she would want to be intubated. Patient's  was also admitted to [facility], however patient expressed to staff that she would not want him to make any decisions regarding her care, stating that she did not trust his intentions. Patient’s work of breathing on 3/4/2018 increased considerably, with a respiratory rate of 50 breaths per minute, and the patient no longer having capacity. Patient’s stepson from her first marriage who lives in [place] was called given no other identified family members that she trusted. The stepson expressed that Ms. Bettencourt had told him that she would not want to be intubated if she would not be expected to come off a respirator. Given the team’s concerns that there was no written documentation of such and this individual appeared fairly removed from the patient a consult was placed as to if it was appropriate to make Ms. Bettencourt DNI.   Prognosis Estimate (survival in days, wks, mos, yrs): hours to weeks  Patient Decision-Making Capacity:  Has capacity    Lacks capacity due to metabolic encephalopathy 	  Patient Aware of:  Diagnosis:   Yes    No   Unknown    Prognosis:   Yes    No   Unknown       Name of medical decision-maker should patient lack capacity: Elle Lee     Role:   Health Care Agent      Legal Surrogate   Contact #(s): [phone]    Other Decision-Maker (i.e., HCA or Surrogate) Aware of:  Diagnosis: Yes   No      Prognosis:   Yes     No   Evidence of Patient’s Preference of Life-Sustaining Treatment (Written or Oral): None   Resuscitation status:   DNR:  Yes   No      DNI:  Yes   No          Discussions: 3/3/18 (16:50-17:00): Ethics fellow spoke to attending Dr. Mills who asked for clarity on decision making regarding DNI status for a patient. Dr. Mills expressed how his 89 year old patient had worsening respiratory issues following pneumonia after recovering from influenza. He expressed that he felt the patient was unlikely to come off a respirator should she be put on one. Dr. Mills expressed that though he had spoken with the patient’s current , who had stated that his wife would not want to be kept alive on a breathing tube permanently. At the same time Dr. Mills   Bioethics analysis: Here we have a conflict between the ethical principles of autonomy and non-maleficence and beneficence. Autonomy recognizes that not all individuals have the same preferences and goals and thus that different individuals will make different decisions regarding their health. Autonomy centers on letting patients/their surrogates decide what is best for their health/their family members’ health after being fully informed of the options available as well as the risks and benefits of those options. Non-maleficence focuses on the clinician’s desire to do no harm. While, beneficence focuses on the clinicians’ desire to do what is best for their patient, requiring that health care providers consider what is best for the patient given the individual patient’s unique circumstances.   As for autonomy, essential to the notion of autonomy is that of capacity, making sure the patient is fit to protect his/her autonomy. In this situation, the patient lacks capacity due to metabolic encephalopathy as a result of hypoxic hypercarbic respiratory failure. Although Mrs. Bettencourt lacks capacity, she maintains the right to autonomy, and given that when she had capacity and stated that she did not want her  to make decisions on her behalf, it is appropriate that this judgement is respected. The 2010 [facility] Family Health Care Decision Act (CDA) addresses the situation of a sick individual who lacks capacity, but has no health care proxy. Under this law a hierarchy of potential surrogates is established and the surrogate is given the exclusive right to make decisions about whether to use or forgo life sustaining treatments (LSTs) when it appears that the patient will die without them. The ethical standard the surrogate is supposed to apply to such decision-making is first, "substituted judgment" – based on the patient’s prior articulation of preferences for such a situation or second, "best interests". The CDA establishes a hierarchy of surrogacy, in order of priority decision maker for the incapable patient:   – an [facility] Article 81 court-appointed guardian;   – the spouse or domestic partner as defined in the [facility] who is either ([facility] § 2994-a);   – an adult child;   – a parent;   – a brother or sister;   – a close friend	  Given that the patient lacks a court appointed guardian, a spouse (who she trusts), an adult child, a parent, and siblings her adult step son from a prior marriage, Elle Lee, would function as the equivalent of a close friend and is the appropriate surrogate to make decisions on Mrs. Bettencourt’s behalf. The stepson has expressed that Mrs. Bettencourt would not want to be intubated should it be expected that she would not come off a ventilator.  Turning to non-maleficence and beneficence, we applaud the team for their fervent desire to provide both beneficent and non-maleficent care. Although this individual may appear somewhat removed from the patient, he has expressed speaking with her just last week, and given the length of time he has known this patient in comparison to the length of time the team has, it is reasonable to believe that he is better able to relay the patient’s substituted judgement than a member of the medical staff would be able to. We also must keep in mind that at 89 years old, with COPD requiring home oxygen at baseline, concern for heart failure, and severely worsened pneumonia it is unlikely the patient will come off a ventilator. Given that her step son has expressed that the patient relayed to him that she would not want to remain permanently intubated, it is appropriate to make the patient DNI. Clinical summary: Patient is a 89-year-old woman with past medical history of COPD on home oxygen admitted 2/23/18 to [facility] with hypoxic hypercarbic respiratory failure in the setting of influenza infection and was treated with tamiflu as well as IV antibiotics. During hospital stay patient was also noted to be in afib/aflutter rate controlled with cardizem, with concern for heart failure. Patient was diuresed with lasix and required BiPAP. On BiPAP patient reported shortness of breath, but expressed that she was unsure (per chart note)s if she would want to be intubated. Patient's  was also admitted to [facility], however patient expressed to staff that she would not want him to make any decisions regarding her care, stating that she did not trust his intentions. Patient’s work of breathing on 3/4/2018 increased considerably, with a respiratory rate of 50 breaths per minute, and the patient no longer having capacity. Pulmonology stated that the patient would be a poor candidate for intubation. The patient’s stepson from her first marriage who lives in [place] was called given that no other identified family members could be identified that the patient trusted. The stepson expressed that Ms. Bettencourt had told him that she would not want to be intubated if she would not be expected to come off a respirator. Given the team’s concerns that there was no written documentation of such and this individual appeared fairly removed from the patient a consult was placed as to if it was appropriate to make Ms. Bettencourt DNI.   Prognosis Estimate (survival in days, wks, mos, yrs): hours to weeks  Patient Decision-Making Capacity:  Has capacity    Lacks capacity due to metabolic encephalopathy as a result of hypoxic hypercarbic respiratory failure  Patient Aware of:  Diagnosis:   Yes    No   Unknown    Prognosis:   Yes    No   Unknown       Name of medical decision-maker should patient lack capacity: Elle Lee     Role:   Health Care Agent      Legal Surrogate   Contact #(s): [phone]    Other Decision-Maker (i.e., HCA or Surrogate) Aware of:  Diagnosis: Yes   No      Prognosis:   Yes     No   Evidence of Patient’s Preference of Life-Sustaining Treatment (Written or Oral): None   Resuscitation status:   DNR:  Yes   No      DNI:  Yes   No          Discussions: 3/3/18 (16:50-17:00): Ethics fellow spoke to attending Dr. Mills who asked for clarity on decision making regarding DNI status for a patient. Dr. Mills expressed how his 89 year old patient had worsening respiratory issues following pneumonia after recovering from influenza. He expressed that he felt the patient was unlikely to come off a respirator should she be put on one. Dr. Mills expressed that though he had spoken with the patient’s current , who had stated that his wife would not want to be kept alive on a breathing tube permanently. At the same time Dr. Mills knew that the patient had indicated concern with her  speaking on her behalf and thus stated that he attempted to identify other loved ones. Dr. Mills stated that he ultimately found a step son from a prior marriage. This stepson expressed that he had spoken to Mrs. Bettencourt just last week and that she had relayed to him in the past that she would not want to remain permanently ventilated.    Bioethics analysis: Here we have a conflict between the ethical principles of autonomy and non-maleficence and beneficence. Autonomy recognizes that not all individuals have the same preferences and goals and thus that different individuals will make different decisions regarding their health. Autonomy centers on letting patients/their surrogates decide what is best for their health/their family members’ health after being fully informed of the options available as well as the risks and benefits of those options. Non-maleficence focuses on the clinician’s desire to do no harm. While, beneficence focuses on the clinicians’ desire to do what is best for their patient, requiring that health care providers consider what is best for the patient given the individual patient’s unique circumstances.   As for autonomy, essential to the notion of autonomy is that of capacity, making sure the patient is fit to protect his/her autonomy. In this situation, the patient lacks capacity due to metabolic encephalopathy as a result of hypoxic hypercarbic respiratory failure. Although Mrs. Bettencourt lacks capacity, she maintains the right to autonomy, and given that when she had capacity and stated that she did not want her  to make decisions on her behalf, it is appropriate that the team attempted to seek out an additional loved one for an opinion on the matter. The 2010 [facility] Family Health Care Decision Act (FHCDA) addresses the situation of a sick individual who lacks capacity, but has no health care proxy. Under this law a hierarchy of potential surrogates is established and the surrogate is given the exclusive right to make decisions about whether to use or forgo life sustaining treatments (LSTs) when it appears that the patient will die without them. The ethical standard the surrogate is supposed to apply to such decision-making is first, "substituted judgment" – based on the patient’s prior articulation of preferences for such a situation or second, "best interests". The [facility] establishes a hierarchy of surrogacy, in order of priority decision maker for the incapable patient:   – an [facility] Article 81 court-appointed guardian;   – the spouse or domestic partner as defined in the [facility] who is either ([facility] § 2994-a);   – an adult child;   – a parent;   – a brother or sister;   – a close friend	  Given that the patient lacks a court appointed guardian, we would normally turn to her spouse for decision making. However, given that she has relayed a lack of trust in his intentions and ability to appropriately represent her it is appropriate that we seek a second option on the matter. Given that she has no biological adult children, no living parents, and no living siblings her adult step son from a prior marriage, Elle Lee, would function as the equivalent of a close friend and is the appropriate surrogate to make decisions on Mrs. Bettencourt’s behalf. The stepson has expressed that Mrs. Bettencourt would not want to be intubated should it be expected that she would not come off a ventilator. This view has been further supported by Mrs. Bettencourt's  and long term home aid.   Turning to non-maleficence and beneficence, we applaud the team for their fervent desire to provide both beneficent and non-maleficent care. Although this individual may appear somewhat removed from the patient, he has expressed speaking with her just last week, and given the length of time he has known this patient in comparison to the length of time the team has, it is reasonable to believe that he is better able to relay the patient’s substituted judgement than a member of the medical staff would be able to. Although for most patients with hypoxic hypercarbic respiratory failure in the setting of pneumonia a trial of intubation would be reasonable, we also must keep in mind that this patient has significant comorbidities lessening the chance of successful extubation. Mrs. Bettencourt is 89 years old, with COPD requiring home oxygen at baseline, concern for heart failure, and severely worsened pneumonia with pulmonology stating that she is a poor candidate for intubation, which her primary attending Dr. Mills seconds. Thus, it appears unlikely the patient will be able to be successfully extubated. Given that her step son has expressed that Mrs. Bettencourt relayed to him that she would not want to remain permanently intubated, and all other known contacts have expressed this belief as well, it is appropriate to make the patient DNI. Clinical summary: Patient is a 89-year-old woman with past medical history of COPD on home oxygen admitted 2/23/18 to [facility] with hypoxic hypercarbic respiratory failure in the setting of influenza infection and was treated with tamiflu as well as IV antibiotics. During hospital stay patient was also noted to be in afib/aflutter rate controlled with cardizem, with concern for heart failure. Patient was diuresed with lasix and required BiPAP. On BiPAP patient reported shortness of breath, but expressed that she was unsure (per chart note)s if she would want to be intubated. Patient's  was also admitted to [facility], however patient expressed to staff that she would not want him to make any decisions regarding her care, stating that she did not trust his intentions. Patient’s work of breathing on 3/4/2018 increased considerably, with a respiratory rate of 50 breaths per minute, and the patient no longer having capacity. Pulmonology stated that the patient would be a poor candidate for intubation. The patient’s stepson from her first marriage who lives in [place] was called given that no other identified family members could be identified that the patient trusted. The stepson expressed that Ms. Bettencourt had told him that she would not want to be intubated if she would not be expected to come off a respirator. Given the team’s concerns that there was no written documentation of such and this individual appeared fairly removed from the patient a consult was placed as to if it was appropriate to make Ms. Bettencourt DNI.   Prognosis Estimate (survival in days, wks, mos, yrs): hours to weeks  Patient Decision-Making Capacity:  Has capacity    Lacks capacity due to metabolic encephalopathy as a result of hypoxic hypercarbic respiratory failure  Patient Aware of:  Diagnosis:   Yes    No   Unknown    Prognosis:   Yes    No   Unknown       Name of medical decision-maker should patient lack capacity: Krish Bettencourt,      Role:   Health Care Agent      Legal Surrogate   Contact #(s): [phone]    Other Decision-Maker (i.e., HCA or Surrogate) Aware of: Elle Lee rikon, [phone]  Diagnosis: Yes   No      Prognosis:   Yes     No   Evidence of Patient’s Preference of Life-Sustaining Treatment (Written or Oral): None   Resuscitation status:   DNR:  Yes   No      DNI:  Yes   No          Discussions: 3/3/18 (16:50-17:00): Ethics fellow spoke to attending Dr. Mills who asked for clarity on decision making regarding DNI status for a patient. Dr. Mills expressed how his 89 year old patient had worsening respiratory issues following pneumonia after recovering from influenza. He expressed that he felt the patient was unlikely to come off a respirator should she be put on one. Dr. Mills expressed that though he had spoken with the patient’s current , who had stated that his wife would not want to be kept alive on a breathing tube permanently. At the same time Dr. Mills knew that the patient had indicated concern with her  speaking on her behalf and thus stated that he attempted to identify other loved ones. Dr. Mills stated that he ultimately found a step son from a prior marriage. This stepson expressed that he had spoken to Mrs. Bettencourt just last week and that she had relayed to him in the past that she would not want to remain permanently ventilated.    Bioethics analysis: Here we have a conflict between the ethical principles of autonomy and non-maleficence and beneficence. Autonomy recognizes that not all individuals have the same preferences and goals and thus that different individuals will make different decisions regarding their health. Autonomy centers on letting patients/their surrogates decide what is best for their health/their family members’ health after being fully informed of the options available as well as the risks and benefits of those options. Non-maleficence focuses on the clinician’s desire to do no harm. While, beneficence focuses on the clinicians’ desire to do what is best for their patient, requiring that health care providers consider what is best for the patient given the individual patient’s unique circumstances.   As for autonomy, essential to the notion of autonomy is that of capacity, making sure the patient is fit to protect his/her autonomy. In this situation, the patient lacks capacity due to metabolic encephalopathy as a result of hypoxic hypercarbic respiratory failure. Although Mrs. Bettencourt lacks capacity, she maintains the right to autonomy, and given that when she had capacity and stated that she did not want her  to make decisions on her behalf, it is appropriate that the team attempted to seek out an additional loved one for an opinion on the matter. The 2010 [facility] Family Health Care Decision Act (FHCDA) addresses the situation of a sick individual who lacks capacity, but has no health care proxy. Under this law a hierarchy of potential surrogates is established and the surrogate is given the exclusive right to make decisions about whether to use or forgo life sustaining treatments (LSTs) when it appears that the patient will die without them. The ethical standard the surrogate is supposed to apply to such decision-making is first, "substituted judgment" – based on the patient’s prior articulation of preferences for such a situation or second, "best interests". The [facility] establishes a hierarchy of surrogacy, in order of priority decision maker for the incapable patient:   – an [facility] Article 81 court-appointed guardian;   – the spouse or domestic partner as defined in the [facility] who is either ([facility] § 2994-a);   – an adult child;   – a parent;   – a brother or sister;   – a close friend	  Given that the patient lacks a court appointed guardian, we would normally turn to her spouse for decision making. However, given that she has relayed a lack of trust in his intentions and ability to appropriately represent her it is appropriate that we seek a second option on the matter. Given that she has no biological adult children, no living parents, and no living siblings her adult step son from a prior marriage, Elle Lee, would function as the equivalent of a close friend and is the appropriate surrogate to make decisions on Mrs. Bettencourt’s behalf. The stepson has expressed that Mrs. Bettencourt would not want to be intubated should it be expected that she would not come off a ventilator. This view has been further supported by Mrs. Bettencourt's  and long term home aid.   Turning to non-maleficence and beneficence, we applaud the team for their fervent desire to provide both beneficent and non-maleficent care. Although this individual may appear somewhat removed from the patient, he has expressed speaking with her just last week, and given the length of time he has known this patient in comparison to the length of time the team has, it is reasonable to believe that he is better able to relay the patient’s substituted judgement than a member of the medical staff would be able to. Although for most patients with hypoxic hypercarbic respiratory failure in the setting of pneumonia a trial of intubation would be reasonable, we also must keep in mind that this patient has significant comorbidities lessening the chance of successful extubation. Mrs. Bettencourt is 89 years old, with COPD requiring home oxygen at baseline, concern for heart failure, and severely worsened pneumonia with pulmonology stating that she is a poor candidate for intubation, which her primary attending Dr. Mills seconds. Thus, it appears unlikely the patient will be able to be successfully extubated. Given that her step son has expressed that Mrs. Bettencourt relayed to him that she would not want to remain permanently intubated, and all other known contacts have expressed this belief as well, it is appropriate to make the patient DNI. Clinical summary: Patient is a 89-year-old woman with past medical history of COPD on home oxygen admitted 2/23/18 to [facility] with hypoxic hypercarbic respiratory failure in the setting of influenza infection and was treated with tamiflu as well as IV antibiotics. During hospital stay patient was also noted to be in afib/aflutter rate controlled with cardizem, with concern for heart failure. Patient was diuresed with lasix and required BiPAP. On BiPAP patient reported shortness of breath, but expressed that she was unsure (per chart note)s if she would want to be intubated. Patient's  was also admitted to [facility], however patient expressed to staff that she would not want him to make any decisions regarding her care, stating that she did not trust his intentions. Patient’s work of breathing on 3/4/2018 increased considerably, with a respiratory rate of 50 breaths per minute, and the patient no longer having capacity. Pulmonology stated that the patient would be a poor candidate for intubation. The patient’s stepson from her first marriage who lives in [place] was called given that no other identified family members could be identified that the patient trusted. The stepson expressed that Ms. Bettencourt had told him that she would not want to be intubated if she would not be expected to come off a respirator. Given the team’s concerns that there was no written documentation of such and this individual appeared fairly removed from the patient a ethics consult was placed as to if it was appropriate to make Ms. Bettencourt DNI.   Prognosis Estimate (survival in days, wks, mos, yrs): hours to weeks  Patient Decision-Making Capacity:  Has capacity    Lacks capacity due to metabolic encephalopathy as a result of hypoxic hypercarbic respiratory failure  Patient Aware of:  Diagnosis:   Yes    No   Unknown    Prognosis:   Yes    No   Unknown       Name of medical decision-maker should patient lack capacity: Krish Bettencourt,      Role:   Health Care Agent      Legal Surrogate   Contact #(s): [phone]    Other Decision-Maker (i.e., HCA or Surrogate) Aware of: Elle Lee heidi, [phone]  Diagnosis: Yes   No      Prognosis:   Yes     No   Evidence of Patient’s Preference of Life-Sustaining Treatment (Written or Oral): None   Resuscitation status:   DNR:  Yes   No      DNI:  Yes   No          Discussions: 3/3/18 (16:50-17:00): Ethics fellow spoke to attending Dr. Mills who asked for clarity on decision making regarding DNI status for a patient. Dr. Mills expressed how his 89 year old patient had worsening respiratory issues following pneumonia after recovering from influenza. He expressed that he felt the patient was unlikely to come off a respirator should she be put on one. Dr. Mills expressed that though he had spoken with the patient’s current , who had stated that his wife would not want to be kept alive on a breathing tube permanently. At the same time Dr. Mills knew that the patient had indicated concern with her  speaking on her behalf and thus stated that he attempted to identify other loved ones. Dr. Mills stated that he ultimately found a step son from a prior marriage. This stepson expressed that he had spoken to Mrs. Bettencourt just last week and that she had relayed to him in the past that she would not want to remain permanently ventilated.    Bioethics analysis: Here we have a conflict between the ethical principles of autonomy and non-maleficence and beneficence. Autonomy recognizes that not all individuals have the same preferences and goals and thus that different individuals will make different decisions regarding their health. Autonomy centers on letting patients/their surrogates decide what is best for their health/their family members’ health after being fully informed of the options available as well as the risks and benefits of those options. Non-maleficence focuses on the clinician’s desire to do no harm. While, beneficence focuses on the clinicians’ desire to do what is best for their patient, requiring that health care providers consider what is best for the patient given the individual patient’s unique circumstances.   As for autonomy, essential to the notion of autonomy is that of capacity, making sure the patient is fit to protect his/her autonomy. In this situation, the patient lacks capacity due to metabolic encephalopathy as a result of hypoxic hypercarbic respiratory failure. Although Mrs. Bettencourt lacks capacity, she maintains the right to autonomy, and given that when she had capacity and stated that she did not want her  to make decisions on her behalf, it is appropriate that the team attempted to seek out an additional loved one for an opinion on the matter. The 2010 [facility] Family Health Care Decision Act (FHCDA) addresses the situation of a sick individual who lacks capacity, but has no health care proxy. Under this law a hierarchy of potential surrogates is established and the surrogate is given the exclusive right to make decisions about whether to use or forgo life sustaining treatments (LSTs) when it appears that the patient will die without them. The ethical standard the surrogate is supposed to apply to such decision-making is first, "substituted judgment" – based on the patient’s prior articulation of preferences for such a situation or second, "best interests". The [facility] establishes a hierarchy of surrogacy, in order of priority decision maker for the incapable patient:   – an [facility] Article 81 court-appointed guardian;   – the spouse or domestic partner as defined in the [facility] who is either ([facility] § 2994-a);   – an adult child;   – a parent;   – a brother or sister;   – a close friend	  Given that the patient lacks a court appointed guardian, the question becomes whether Mrs. Bettencourt's statements of distrust of her spouse constitutes a reason to remove him as her hierarchal surrogate. Elle Lee,  The stepson has expressed that Mrs. Bettencourt would not want to be intubated should it be expected that she would not come off a ventilator. This view has been further supported by Mrs. Bettencourt's  and long term home aid.   Turning to non-maleficence and beneficence, we applaud the team for their fervent desire to provide both beneficent and non-maleficent care. Although this individual may appear somewhat removed from the patient, he has expressed speaking with her just last week, and given the length of time he has known this patient in comparison to the length of time the team has, it is reasonable to believe that he is better able to relay the patient’s substituted judgement than a member of the medical staff would be able to. Although for most patients with hypoxic hypercarbic respiratory failure in the setting of pneumonia a trial of intubation would be reasonable, we also must keep in mind that this patient has significant comorbidities lessening the chance of successful extubation. Mrs. Bettencourt is 89 years old, with COPD requiring home oxygen at baseline, concern for heart failure, and severely worsened pneumonia with pulmonology stating that she is a poor candidate for intubation, which her primary attending Dr. Mills seconds. Thus, it appears unlikely the patient will be able to be successfully extubated. Given that her step son has also  expressed that Mrs. Bettencourt relayed to him that she would not want to remain permanently intubated, and all other known contacts have expressed this belief as well, it is appropriate to make the patient DNI.   This corroboration affords the team with knowledge that her  is acting in her best interests and is expressing her substituted judgment.

## 2018-03-04 NOTE — CHART NOTE - NSCHARTNOTEFT_GEN_A_CORE
NP Note (episodic)     Called by RN and RT that pt is tachyneic - RR 50s in bipap. Pt seen and evaluated. Pt on bipap tachypneic and dyspneic ,stating " I can"t breathe" unclear unable to express full sentences.  She was not hypoxic.       ROS: unable to contribute to ROS secondary to dyspnea      Physical Exam:    Vital Signs Last 24 Hrs  T(C): 36.7 (03 Mar 2018 22:04), Max: 36.7 (03 Mar 2018 05:09)  T(F): 98.1 (03 Mar 2018 22:04), Max: 98.1 (03 Mar 2018 22:04)  HR: 86 (03 Mar 2018 23:24) (57 - 146)  BP: 121/66 (03 Mar 2018 23:20) (114/47 - 166/77)  BP(mean): --  RR: 20 (03 Mar 2018 22:04) (18 - 22)  SpO2: 95% (03 Mar 2018 23:24) (93% - 100%)    General: WN/WD NAD, AOx3, nontoxic appearing  Head:  NC/AT, no scleral injection, no JVD   CV: RRR, S1S2   Respiratory: CTA B/L, nonlabored  Abdominal: Soft, NT, ND no palpable mass, no guarding or rebound tenderness  MSK: No BLLE edema, + peripheral pulses, FROM all 4 extremity      Labs:                          10.7   20.5  )-----------( 293      ( 03 Mar 2018 06:51 )             35.2     03-03    138  |  94<L>  |  52<H>  ----------------------------<  201<H>  5.1   |  35<H>  |  0.93    Ca    10.0      03 Mar 2018 06:51  Mg     2.4     03-02      ABG - ( 03 Mar 2018 21:34 )  pH: 7.32  /  pCO2: 82    /  pO2: 138   / HCO3: 41    / Base Excess: 13.0  /  SaO2: 99                    Radiology:    CXR  3/3/2018   IMPRESSION:   Persistent small bilateral pleural effusions.  Increasing right basal haziness due to atelectasis and/or pneumonia.            Assessment & Plan:  H          Follow up with Primary Team in AM. NP Note (episodic)     Called by RN and RT that pt is tachyneic - RR 50s in bipap. Pt seen and evaluated. Pt on bipap tachypneic and dyspneic ,stating " I can"t breathe" unclear unable to express full sentences.  She was not hypoxic.       ROS: unable to contribute to ROS secondary to dyspnea      Physical Exam:    Vital Signs Last 24 Hrs  T(C): 36.7 (03 Mar 2018 22:04), Max: 36.7 (03 Mar 2018 05:09)  T(F): 98.1 (03 Mar 2018 22:04), Max: 98.1 (03 Mar 2018 22:04)  HR: 86 (03 Mar 2018 23:24) (57 - 146)  BP: 121/66 (03 Mar 2018 23:20) (114/47 - 166/77)  BP(mean): --  RR: 20 (03 Mar 2018 22:04) (18 - 22)  SpO2: 95% (03 Mar 2018 23:24) (93% - 100%)    General: lethargic on bipap, tachypneic dyspneic   Head:  NC/AT, no scleral injection, no JVD   CV: S1 S2 irregular tachycardic   Respiratory: CTA B/L - diminished BL bases, + intercostal retractions, + accessory muscle use, RR 55, shallow Tidal volumes 120s on bipap 14/8 35% FiO2   Abdominal: Soft, NT, ND no palpable mass, no guarding or rebound tenderness   - thakkar draining yellow urine   MSK: No BLLE edema, + peripheral pulses, FROM all 4 extremity      Labs:                          10.7   20.5  )-----------( 293      ( 03 Mar 2018 06:51 )             35.2     03-03    138  |  94<L>  |  52<H>  ----------------------------<  201<H>  5.1   |  35<H>  |  0.93    Ca    10.0      03 Mar 2018 06:51  Mg     2.4     03-02      ABG - ( 03 Mar 2018 21:34 )  pH: 7.32  /  pCO2: 82    /  pO2: 138   / HCO3: 41    / Base Excess: 13.0  /  SaO2: 99                    Radiology:    CXR  3/3/2018   IMPRESSION:   Persistent small bilateral pleural effusions.  Increasing right basal haziness due to atelectasis and/or pneumonia.            Assessment & Plan:  89/F with COPD on home oxygen admitted for influenza with superimposed bacterial pna with hypercarbic respiratory failure p/w tachypnea (RR 55+)  dyspnea with intercostal retractions possible COPD exacerbation as pt just received lasix 40 mg IVP and 20mg solumederol IVP. Pt also in afib with RVR -150s.      -dilt 5 mg IVP given x 2, HR 120s   -xopenex given x 4 doses q 15 min with mild improvement in RR -> 40s, tidal volumes improved minimally to 170s.   -f/u CXR   -low  threshold for MICU consult, pt is full code, when asked if she is ok with a tube to support her breathing, she was unclear with the answer   -f/u BMP and Mg, replete if necessary   - Ofirmev 650 mg IV x 1 dose for underlying generalized pain     Will endorse to team in AM     [place] [id number] NP Note (episodic)     Called by RN and RT that pt is tachyneic - RR 50s in bipap. Pt seen and evaluated. Pt on bipap tachypneic and dyspneic ,stating " I can"t breathe" unclear unable to express full sentences.  She was not hypoxic.       ROS: unable to contribute to ROS secondary to dyspnea      Physical Exam:    Vital Signs Last 24 Hrs  T(C): 36.7 (03 Mar 2018 22:04), Max: 36.7 (03 Mar 2018 05:09)  T(F): 98.1 (03 Mar 2018 22:04), Max: 98.1 (03 Mar 2018 22:04)  HR: 86 (03 Mar 2018 23:24) (57 - 146)  BP: 121/66 (03 Mar 2018 23:20) (114/47 - 166/77)  BP(mean): --  RR: 20 (03 Mar 2018 22:04) (18 - 22)  SpO2: 95% (03 Mar 2018 23:24) (93% - 100%)    General: lethargic on bipap, tachypneic dyspneic   Head:  NC/AT, no scleral injection, no JVD   CV: S1 S2 irregular tachycardic   Respiratory: CTA B/L - diminished BL bases, + intercostal retractions, + accessory muscle use, RR 55, shallow Tidal volumes 120s on bipap 14/8 35% FiO2   Abdominal: Soft, NT, ND no palpable mass, no guarding or rebound tenderness   - thakkar draining yellow urine   MSK: No BLLE edema, + peripheral pulses, FROM all 4 extremity      Labs:                          10.7   20.5  )-----------( 293      ( 03 Mar 2018 06:51 )             35.2     03-03    138  |  94<L>  |  52<H>  ----------------------------<  201<H>  5.1   |  35<H>  |  0.93    Ca    10.0      03 Mar 2018 06:51  Mg     2.4     03-02      ABG - ( 03 Mar 2018 21:34 )  pH: 7.32  /  pCO2: 82    /  pO2: 138   / HCO3: 41    / Base Excess: 13.0  /  SaO2: 99                    Radiology:    CXR  3/3/2018   IMPRESSION:   Persistent small bilateral pleural effusions.  Increasing right basal haziness due to atelectasis and/or pneumonia.            Assessment & Plan:  89/F with COPD on home oxygen admitted for influenza with superimposed bacterial pna with hypercarbic respiratory failure p/w tachypnea (RR 55+)  dyspnea with intercostal retractions possible COPD exacerbation as pt just received lasix 40 mg IVP and 20mg solumederol IVP. Pt also in afib with RVR -150s.      -dilt 5 mg IVP given x 2, HR 120s, RVR poorly controlled- gave MN dose PO dilt will continue to monitor   -xopenex given x 4 doses q 15 min with mild improvement in RR -> 40s, tidal volumes improved minimally to 170s.   -f/u CXR   -low  threshold for MICU consult, pt is full code, when asked if she is ok with a tube to support her breathing, she was unclear with the answer   -f/u BMP and Mg, replete if necessary   -ABG- pCO2 retention minimally improved at 82   - Ofirmev 650 mg IV x 1 dose for underlying generalized pain     Will endorse to team in AM     [place] [id number] NP Note (episodic)     Called by RN and RT that pt is tachyneic - RR 50s in bipap. Pt seen and evaluated. Pt on bipap tachypneic and dyspneic ,stating " I can"t breathe" unclear unable to express full sentences.  She was not hypoxic.       ROS: unable to contribute to ROS secondary to dyspnea      Physical Exam:    Vital Signs Last 24 Hrs  T(C): 36.7 (03 Mar 2018 22:04), Max: 36.7 (03 Mar 2018 05:09)  T(F): 98.1 (03 Mar 2018 22:04), Max: 98.1 (03 Mar 2018 22:04)  HR: 86 (03 Mar 2018 23:24) (57 - 146)  BP: 121/66 (03 Mar 2018 23:20) (114/47 - 166/77)  BP(mean): --  RR: 20 (03 Mar 2018 22:04) (18 - 22)  SpO2: 95% (03 Mar 2018 23:24) (93% - 100%)    General: lethargic on bipap, tachypneic dyspneic   Head:  NC/AT, no scleral injection, no JVD   CV: S1 S2 irregular tachycardic, + murmur   Respiratory: CTA B/L - diminished BL bases, + intercostal retractions, + accessory muscle use, RR 55, shallow Tidal volumes 120s on bipap 14/8 35% FiO2   Abdominal: Soft, NT, ND no palpable mass, no guarding or rebound tenderness   - thakkar draining yellow urine   MSK: No BLLE edema, + peripheral pulses, FROM all 4 extremity      Labs:                          10.7   20.5  )-----------( 293      ( 03 Mar 2018 06:51 )             35.2     03-03    138  |  94<L>  |  52<H>  ----------------------------<  201<H>  5.1   |  35<H>  |  0.93    Ca    10.0      03 Mar 2018 06:51  Mg     2.4     03-02      ABG - ( 03 Mar 2018 21:34 )  pH: 7.32  /  pCO2: 82    /  pO2: 138   / HCO3: 41    / Base Excess: 13.0  /  SaO2: 99                    Radiology:    CXR  3/3/2018   IMPRESSION:   Persistent small bilateral pleural effusions.  Increasing right basal haziness due to atelectasis and/or pneumonia.            Assessment & Plan:  89/F with COPD on home oxygen admitted for influenza with superimposed bacterial pna with hypercarbic respiratory failure p/w tachypnea (RR 55+)  dyspnea with intercostal retractions possible COPD exacerbation as pt just received lasix 40 mg IVP and 20mg solumederol IVP. Pt also in afib with RVR -150s.      -dilt 5 mg IVP given x 2, HR 120s, RVR poorly controlled- gave MN dose PO dilt will continue to monitor   -xopenex given x 4 doses q 15 min with mild improvement in RR -> 40s, tidal volumes improved minimally to 170s.   -f/u CXR   -low  threshold for MICU consult, pt is full code, when asked if she is ok with a tube to support her breathing, she was unclear with the answer   -f/u BMP and Mg, replete if necessary   -ABG- pCO2 retention minimally improved at 82   - Ofirmev 650 mg IV x 1 dose for underlying generalized pain     Will endorse to team in AM     [place] [id number] NP Note (episodic)     Called by RN and RT that pt is tachyneic - RR 50s in bipap. Pt seen and evaluated. Pt on bipap tachypneic and dyspneic ,stating " I can"t breathe" unclear unable to express full sentences.  She was not hypoxic.       ROS: unable to contribute to ROS secondary to dyspnea      Physical Exam:    Vital Signs Last 24 Hrs  T(C): 36.7 (03 Mar 2018 22:04), Max: 36.7 (03 Mar 2018 05:09)  T(F): 98.1 (03 Mar 2018 22:04), Max: 98.1 (03 Mar 2018 22:04)  HR: 86 (03 Mar 2018 23:24) (57 - 146)  BP: 121/66 (03 Mar 2018 23:20) (114/47 - 166/77)  BP(mean): --  RR: 20 (03 Mar 2018 22:04) (18 - 22)  SpO2: 95% (03 Mar 2018 23:24) (93% - 100%)    General: lethargic on bipap, tachypneic dyspneic   Head:  NC/AT, no scleral injection, no JVD   CV: S1 S2 irregular tachycardic, + murmur   Respiratory: CTA B/L - diminished BL bases, + intercostal retractions, + accessory muscle use, RR 55, shallow Tidal volumes 120s on bipap 14/8 35% FiO2   Abdominal: Soft, NT, ND no palpable mass, no guarding or rebound tenderness   - thakkar draining yellow urine   MSK: No BLLE edema, + peripheral pulses, FROM all 4 extremity, kyphosis       Labs:                          10.7   20.5  )-----------( 293      ( 03 Mar 2018 06:51 )             35.2     03-03    138  |  94<L>  |  52<H>  ----------------------------<  201<H>  5.1   |  35<H>  |  0.93    Ca    10.0      03 Mar 2018 06:51  Mg     2.4     03-02      ABG - ( 03 Mar 2018 21:34 )  pH: 7.32  /  pCO2: 82    /  pO2: 138   / HCO3: 41    / Base Excess: 13.0  /  SaO2: 99                    Radiology:    CXR  3/3/2018   IMPRESSION:   Persistent small bilateral pleural effusions.  Increasing right basal haziness due to atelectasis and/or pneumonia.            Assessment & Plan:  89/F with COPD on home oxygen admitted for influenza with superimposed bacterial pna with hypercarbic respiratory failure p/w tachypnea (RR 55+)  dyspnea with intercostal retractions possible COPD exacerbation as pt just received lasix 40 mg IVP and 20mg solumederol IVP. Pt also in afib with RVR -150s.      -dilt 5 mg IVP given x 2, HR 120s, RVR poorly controlled- gave MN dose PO dilt will continue to monitor   -xopenex given x 4 doses q 15 min with mild improvement in RR -> 40s, tidal volumes improved minimally to 170s.   -f/u CXR   -low  threshold for MICU consult, pt is full code, when asked if she is ok with a tube to support her breathing, she was unclear with the answer   -f/u BMP and Mg, replete if necessary   -ABG- pCO2 retention minimally improved at 82   - Ofirmev 650 mg IV x 1 dose for underlying generalized pain     Will endorse to team in AM     [place] [id number] NP Note (episodic)     Called by RN and RT that pt is tachyneic - RR 50s in bipap. Pt seen and evaluated. Pt on bipap tachypneic and dyspneic ,stating " I can"t breathe" unclear unable to express full sentences.  She was not hypoxic.       ROS: unable to contribute to ROS secondary to dyspnea      Physical Exam:    Vital Signs Last 24 Hrs  T(C): 36.7 (03 Mar 2018 22:04), Max: 36.7 (03 Mar 2018 05:09)  T(F): 98.1 (03 Mar 2018 22:04), Max: 98.1 (03 Mar 2018 22:04)  HR: 86 (03 Mar 2018 23:24) (57 - 146)  BP: 121/66 (03 Mar 2018 23:20) (114/47 - 166/77)  BP(mean): --  RR: 20 (03 Mar 2018 22:04) (18 - 22)  SpO2: 95% (03 Mar 2018 23:24) (93% - 100%)    General: lethargic on bipap, tachypneic dyspneic   Head:  NC/AT, no scleral injection, no JVD   CV: S1 S2 irregular tachycardic, + murmur   Respiratory: CTA B/L - diminished BL bases, + intercostal retractions, + accessory muscle use, RR 55, shallow Tidal volumes 120s on bipap 14/8 35% FiO2   Abdominal: Soft, NT, ND no palpable mass, no guarding or rebound tenderness   - thakkar draining yellow urine   MSK: No BLLE edema, + peripheral pulses, FROM all 4 extremity, kyphosis       Labs:                          10.7   20.5  )-----------( 293      ( 03 Mar 2018 06:51 )             35.2     03-03    138  |  94<L>  |  52<H>  ----------------------------<  201<H>  5.1   |  35<H>  |  0.93    Ca    10.0      03 Mar 2018 06:51  Mg     2.4     03-02      ABG - ( 03 Mar 2018 21:34 )  pH: 7.32  /  pCO2: 82    /  pO2: 138   / HCO3: 41    / Base Excess: 13.0  /  SaO2: 99                    Radiology:    CXR  3/3/2018   IMPRESSION:   Persistent small bilateral pleural effusions.  Increasing right basal haziness due to atelectasis and/or pneumonia.            Assessment & Plan:  89/F with COPD on home oxygen admitted for influenza with superimposed bacterial pna with hypercarbic respiratory failure p/w tachypnea (RR 55+)  dyspnea with intercostal retractions possible COPD exacerbation as pt just received lasix 40 mg IVP and 20mg solumederol IVP. Pt also in afib with RVR -150s.      -dilt 5 mg IVP given x 2, HR 120s, RVR poorly controlled- gave MN dose PO dilt will continue to monitor   -xopenex given x 4 doses q 15 min with mild improvement in RR -> 40s, tidal volumes improved minimally to 170s.   -f/u CXR   -low  threshold for MICU consult, pt is full code, when asked if she is ok with a tube to support her breathing, she was unclear with the answer   -f/u BMP and Mg, replete if necessary   -ABG- pCO2 retention minimally improved at 82   - Ofirmev 650 mg IV x 1 dose for underlying generalized pain  -GOC appreciated by primary Team      Will endorse to team in AM     [place]hav[place] Beck NP [id number]

## 2018-03-04 NOTE — PROVIDER CONTACT NOTE (OTHER) - ASSESSMENT
Patient Alert and Oriented times Four. Patient denies shortness of breath. Patient's Respiratory Rate 19 - 20. Patient's O2 Saturation 92 - 96% on Supplemental Oxygen Nasal Cannula 3 Liters as per NPs order. Patient's Arterial Blood Gas Results: pH, Arterial 7.39. pCO2, Arterial 68. pO2, Arterial 120. HCO3, Arterial 40. Base Excess, Arterial 13.3. Total CO2, Arterial 42. Oxygen Saturation, Arterial 98.

## 2018-03-04 NOTE — CHART NOTE - NSCHARTNOTEFT_GEN_A_CORE
Ethics fellow spoke with attending, and given multiple family members relaying that patient would not want to be intubated should she likely not be able to be successfully extubated, DNI status is appropriate. Full note to follow.

## 2018-03-04 NOTE — PROVIDER CONTACT NOTE (OTHER) - ACTION/TREATMENT ORDERED:
Diltiazem 5 mg IVP x2, ordered and administered but heart rate decreased temporarily and increased back to 140's. Patient maintained on BIPAP. Xopenex ordered and administered for RR. Continue to filiberto

## 2018-03-04 NOTE — CHART NOTE - NSCHARTNOTEFT_GEN_A_CORE
Notify by RN patient on Bipap 14/5   89yo F PMH of hypothyroidism, COPD on home O2, CHF.  p/w CC cough/SOB. Pt. states she has chronic cough but for past couple of days. mucous feels stuck", reports she tried her albuterol inhaler this AM but did not help. Denies fever chills CP n/v/d came to the emergency room with respiratory distress. Tests positive for viral influenza.  CXR negative for pneumonia and CHF, despite BNP of > 5000.EKG with rapid atrial flutter. Given IV cardizem, IV hydration and BiPap with improvement. Started on tamiflu. Remains alert and awake and able to provide a full history independently (23 Feb 2018 19:35)  Vital Signs Last 24 Hrs  T(C): 36.7 (04 Mar 2018 13:03), Max: 37.2 (04 Mar 2018 05:20)  T(F): 98.1 (04 Mar 2018 13:03), Max: 99 (04 Mar 2018 05:20)  HR: 102 (04 Mar 2018 13:03) (57 - 146)  BP: 130/72 (04 Mar 2018 13:03) (121/66 - 147/62)  BP(mean): --  RR: 19 (04 Mar 2018 13:03) (19 - 27)  SpO2: 96% (04 Mar 2018 13:03) (94% - 100%)  ABG - ( 04 Mar 2018 16:26 )  pH: 7.40  /  pCO2: 68    /  pO2: 81    / HCO3: 42    / Base Excess: 14.7  /  SaO2: 96                                  11.0   20.1  )-----------( 305      ( 04 Mar 2018 07:00 )             35.7   03-04    138  |  91<L>  |  65<H>  ----------------------------<  202<H>  4.6   |  38<H>  |  1.26    Ca    10.0      04 Mar 2018 07:00  Mg     2.2     03-04    TPro  7.3  /  Alb  3.5  /  TBili  0.3  /  DBili  x   /  AST  16  /  ALT  63<H>  /  AlkPhos  66  03-04    # RRT  Called

## 2018-03-04 NOTE — PROVIDER CONTACT NOTE (CHANGE IN STATUS NOTIFICATION) - ASSESSMENT
Patient Alert and Oriented times Three. Patient's Vital Signs: Temperature 98.2 F Axillary, Heart Rate 130, Blood Pressure 137/75, Respiratory Rate 57 and O2 Saturation 94% on BiPAP as ordered. Patient's Heart Rhythm is Atrial Fibrillation & Heart Rate 100 - 130's on the cardiac monitor.

## 2018-03-04 NOTE — CONSULT NOTE ADULT - ASSESSMENT
CONCLUSION: Under the [facility] Care Decision Act, Elle Lee, the patient’s step son from a prior marriage appears the most appropriate individual to speak on the patient’s behalf regarding her beliefs on code status. Given that he has expressed that she would not want to be intubated if extubation was unlikely to be possible, it is reasonable to rely on his substituted judgement and not intubate the patient but rather ensure that she can have comfort centered care. CONCLUSION: Given the concern for Mr. Bettencourt being able to appropriately speak on the patient's behalf it is appropriate that the team seek out a second opinion for decisions on code status and other matters on care.  Rosalinda Lee, the patient’s step son from a prior marriage appears an appropriate individual to speak on the patient’s behalf regarding her beliefs on code status. Given that Rosalinda and all other known contact have expressed that Mrs. Bettencourt would not want to be intubated if extubation was unlikely to be possible, it is reasonable to rely on his substituted judgement and not intubate the patient but rather ensure that she can have comfort centered care. CONCLUSION:  In the absence of a health care proxy or witnessed informed consent conversations of who Mrs Bettencourt desires as a health care agent, Mr. Bettencourt her  is the moral agent to consent to medical orders. Given that Rosalinda and all other known contact have expressed that Mrs. Bettencourt would not want to be intubated if extubation was unlikely to be possible, it is reasonable to rely on his substituted judgement and not intubate the patient but rather ensure that she can have comfort centered care. Moreover, no suggestion of conflict of interest or other behavior exists to remove his legal right as his wife's surrogate.

## 2018-03-04 NOTE — PROGRESS NOTE ADULT - ATTENDING COMMENTS
89 y/o woman with AF with RVR in the setting of COPD exacerbation, PNA, and influenza.  --Please check transthoracic echocardiogram.  --AF rates likely driven by underlying infectious and pulmonary issues.  --Antibiotics and Pulmonary therapy per primary team and pulmonology.  --On cardizem for AF though low-dose; could consider trying higher dose of 60mg if BP can tolerate.  --Could also consider adding digoxin if HR remain elevated; with some bi-pap and optimization of respiratory status/acid base status, however, rates appear slightly better currently.  --Will follow. 89 y/o woman with AF with RVR in the setting of COPD exacerbation, PNA, and influenza.  --Please check transthoracic echocardiogram.  --AF rates likely driven by underlying infectious and pulmonary issues.  --Antibiotics and Pulmonary therapy per primary team and pulmonology.  --On cardizem for AF though low-dose; could consider trying higher dose of 60mg q 6 hours if BP can tolerate; SBP currently 130s.  --Could also consider adding digoxin if HR remain elevated; with some bi-pap and optimization of respiratory status/acid base status, however, rates appear slightly better currently.  --Will follow.

## 2018-03-04 NOTE — PROGRESS NOTE ADULT - SUBJECTIVE AND OBJECTIVE BOX
Cardiology Weekend Coverage Note    CC: duspnea, PNA, AF with RVR.    Interval History:  Significant dyspnea over night requiring bi-pap; currently on nasal cannula and feeling better.    MEDICATIONS:  acetaminophen   Tablet. 650 milliGRAM(s) Oral every 6 hours PRN  ALBUTerol/ipratropium for Nebulization 3 milliLiter(s) Nebulizer every 6 hours  azithromycin  IVPB 500 milliGRAM(s) IV Intermittent every 24 hours  azithromycin  IVPB      buDESOnide   0.25 milliGRAM(s) Respule 0.25 milliGRAM(s) Inhalation two times a day  cefepime  IVPB 1000 milliGRAM(s) IV Intermittent every 12 hours  cefepime  IVPB      cholecalciferol 1000 Unit(s) Oral two times a day  diltiazem    Tablet 30 milliGRAM(s) Oral every 6 hours  docusate sodium 100 milliGRAM(s) Oral two times a day  furosemide   Injectable 40 milliGRAM(s) IV Push every 12 hours  guaiFENesin    Syrup 200 milliGRAM(s) Oral every 6 hours PRN  guaiFENesin  milliGRAM(s) Oral every 12 hours  heparin  Injectable 5000 Unit(s) SubCutaneous every 12 hours  lidocaine   Patch 1 Patch Transdermal daily  methylPREDNISolone sodium succinate Injectable 20 milliGRAM(s) IV Push every 8 hours  polyethylene glycol 3350 17 Gram(s) Oral daily  senna 2 Tablet(s) Oral at bedtime  sodium chloride 0.65% Nasal 1 Spray(s) Both Nostrils four times a day PRN  tiotropium 18 MICROgram(s) Capsule 1 Capsule(s) Inhalation daily      LABS:      138  |  91<L>  |  65<H>  ----------------------------<  202<H>  4.6   |  38<H>  |  1.26    Ca    10.0      04 Mar 2018 07:00  Mg     2.2         TPro  7.3  /  Alb  3.5  /  TBili  0.3  /  DBili  x   /  AST  16  /  ALT  63<H>  /  AlkPhos  66                            11.0   20.1  )-----------( 305      ( 04 Mar 2018 07:00 )             35.7       VITAL SIGNS:   T(C): 36.7 (18 @ 13:03), Max: 37.2 (18 @ 05:20)  HR: 102 (18 @ 13:03) (57 - 146)  BP: 130/72 (18 @ 13:03) (114/47 - 147/62)  RR: 19 (18 @ 13:03) (19 - 27)  SpO2: 96% (18 @ 13:03) (94% - 100%)  Daily     Daily Weight in k.2 (04 Mar 2018 05:20)  I&O's Summary    03 Mar 2018 07:01  -  04 Mar 2018 07:00  --------------------------------------------------------  IN: 100 mL / OUT: 900 mL / NET: -800 mL    04 Mar 2018 07:01  -  04 Mar 2018 13:07  --------------------------------------------------------  IN: 580 mL / OUT: 1340 mL / NET: -760 mL    Physical Exam:   Gen: Sleeping but arousable; resting comfortably in NAD.  Frail and cachectic.   Chest: Ronchi appreciated anteriorly bilaterally.  CV: Irregular, tachycardic, S1S2.  Abd: Soft, N/D.  Psych: Awake, NAD.  Ext: Trace LE edema.     TELE:  Currently AF in low 100s; periodically into 120s and 130s. Cardiology Weekend Coverage Note    CC: dyspnea, PNA, AF with RVR.    Interval History:  Overnight events noted; significant dyspnea over night requiring bi-pap; currently on nasal cannula and feeling better.    MEDICATIONS:  acetaminophen   Tablet. 650 milliGRAM(s) Oral every 6 hours PRN  ALBUTerol/ipratropium for Nebulization 3 milliLiter(s) Nebulizer every 6 hours  azithromycin  IVPB 500 milliGRAM(s) IV Intermittent every 24 hours  azithromycin  IVPB      buDESOnide   0.25 milliGRAM(s) Respule 0.25 milliGRAM(s) Inhalation two times a day  cefepime  IVPB 1000 milliGRAM(s) IV Intermittent every 12 hours  cefepime  IVPB      cholecalciferol 1000 Unit(s) Oral two times a day  diltiazem    Tablet 30 milliGRAM(s) Oral every 6 hours  docusate sodium 100 milliGRAM(s) Oral two times a day  furosemide   Injectable 40 milliGRAM(s) IV Push every 12 hours  guaiFENesin    Syrup 200 milliGRAM(s) Oral every 6 hours PRN  guaiFENesin  milliGRAM(s) Oral every 12 hours  heparin  Injectable 5000 Unit(s) SubCutaneous every 12 hours  lidocaine   Patch 1 Patch Transdermal daily  methylPREDNISolone sodium succinate Injectable 20 milliGRAM(s) IV Push every 8 hours  polyethylene glycol 3350 17 Gram(s) Oral daily  senna 2 Tablet(s) Oral at bedtime  sodium chloride 0.65% Nasal 1 Spray(s) Both Nostrils four times a day PRN  tiotropium 18 MICROgram(s) Capsule 1 Capsule(s) Inhalation daily      LABS:      138  |  91<L>  |  65<H>  ----------------------------<  202<H>  4.6   |  38<H>  |  1.26    Ca    10.0      04 Mar 2018 07:00  Mg     2.2         TPro  7.3  /  Alb  3.5  /  TBili  0.3  /  DBili  x   /  AST  16  /  ALT  63<H>  /  AlkPhos  66                            11.0   20.1  )-----------( 305      ( 04 Mar 2018 07:00 )             35.7       VITAL SIGNS:   T(C): 36.7 (18 @ 13:03), Max: 37.2 (18 @ 05:20)  HR: 102 (18 @ 13:03) (57 - 146)  BP: 130/72 (18 @ 13:03) (114/47 - 147/62)  RR: 19 (18 @ 13:03) (19 - 27)  SpO2: 96% (18 @ 13:03) (94% - 100%)  Daily     Daily Weight in k.2 (04 Mar 2018 05:20)  I&O's Summary    03 Mar 2018 07:  -  04 Mar 2018 07:00  --------------------------------------------------------  IN: 100 mL / OUT: 900 mL / NET: -800 mL    04 Mar 2018 07:  -  04 Mar 2018 13:07  --------------------------------------------------------  IN: 580 mL / OUT: 1340 mL / NET: -760 mL    Physical Exam:   Gen: Sleeping but arousable; resting comfortably in NAD.  Frail and cachectic.   Chest: Ronchi appreciated anteriorly bilaterally.  CV: Irregular, tachycardic, S1S2.  Abd: Soft, N/D.  Psych: Awake, NAD.  Ext: Trace LE edema.     TELE:  Currently AF in low 100s; periodically into 120s and 130s.

## 2018-03-04 NOTE — CONSULT NOTE ADULT - CONSULT REASON
Consult purpose: To assist the healthcare team on the case of a 89-year-old woman with severe dementia, with questions pertaining to the appropriateness of the patient’s code status. Consult purpose: To assist the healthcare team on the case of a 89-year-old woman with questions pertaining to the appropriateness of the patient’s code status.

## 2018-03-04 NOTE — CHART NOTE - NSCHARTNOTEFT_GEN_A_CORE
Notify by RN patient Abnormal ABG   Seen and Examined patient found resting comfortably on 3 liter of oxygen Sat 98 % on continuous pulse ox   Bipap ordered in place as needed for enforce of breathing and tachypnea   appears in no distress.   ABG - ( 04 Mar 2018 12:07 )  pH: 7.39  /  pCO2: 68    /  pO2: 120   / HCO3: 40    / Base Excess: 13.3  /  SaO2: 98      discusses with Pulmonary  DR Kimble   Continue with nasal canal 3liter as tolerated   Bipap as needed with current setting.     Patient is a 89y old  Female who presents with a chief complaint of COUGH,SOB (24 Feb 2018 03:47)  SUBJECTIVE / OVERNIGHT EVENTS:  MEDICATIONS  (STANDING):  ALBUTerol/ipratropium for Nebulization 3 milliLiter(s) Nebulizer every 6 hours  azithromycin  IVPB 500 milliGRAM(s) IV Intermittent every 24 hours  azithromycin  IVPB      buDESOnide   0.25 milliGRAM(s) Respule 0.25 milliGRAM(s) Inhalation two times a day  cefepime  IVPB 1000 milliGRAM(s) IV Intermittent every 12 hours  cefepime  IVPB      cholecalciferol 1000 Unit(s) Oral two times a day  diltiazem    Tablet 30 milliGRAM(s) Oral every 6 hours  docusate sodium 100 milliGRAM(s) Oral two times a day  furosemide   Injectable 40 milliGRAM(s) IV Push every 12 hours  guaiFENesin  milliGRAM(s) Oral every 12 hours  heparin  Injectable 5000 Unit(s) SubCutaneous every 12 hours  lidocaine   Patch 1 Patch Transdermal daily  methylPREDNISolone sodium succinate Injectable 20 milliGRAM(s) IV Push every 8 hours  polyethylene glycol 3350 17 Gram(s) Oral daily  senna 2 Tablet(s) Oral at bedtime  tiotropium 18 MICROgram(s) Capsule 1 Capsule(s) Inhalation daily  MEDICATIONS  (PRN):  acetaminophen   Tablet. 650 milliGRAM(s) Oral every 6 hours PRN Moderate Pain (4 - 6)  guaiFENesin    Syrup 200 milliGRAM(s) Oral every 6 hours PRN Cough  sodium chloride 0.65% Nasal 1 Spray(s) Both Nostrils four times a day PRN Nasal Congestion    CAPILLARY BLOOD GLUCOSE    I&O's Summary    03 Mar 2018 07:01  -  04 Mar 2018 07:00  --------------------------------------------------------  IN: 100 mL / OUT: 900 mL / NET: -800 mL    04 Mar 2018 07:01  -  04 Mar 2018 15:29  --------------------------------------------------------  IN: 950 mL / OUT: 1515 mL / NET: -565 mL        LABS:                        11.0   20.1  )-----------( 305      ( 04 Mar 2018 07:00 )             35.7     03-04    138  |  91<L>  |  65<H>  ----------------------------<  202<H>  4.6   |  38<H>  |  1.26    Ca    10.0      04 Mar 2018 07:00  Mg     2.2     03-04    TPro  7.3  /  Alb  3.5  /  TBili  0.3  /  DBili  x   /  AST  16  /  ALT  63<H>  /  AlkPhos  66  03-04      ABG - ( 04 Mar 2018 12:07 )  pH: 7.39  /  pCO2: 68    /  pO2: 120   / HCO3: 40    / Base Excess: 13.3  /  SaO2: 98          RADIOLOGY & ADDITIONAL TESTS:    PHYSICAL EXAM:  GENERAL: NAD, well-developed  HEAD:  Atraumatic, Normocephalic  EYES: EOMI, PERRLA, conjunctiva and sclera clear  NECK: Supple, No JVD  CHEST/LUNG: Clear to auscultation bilaterally; No wheeze  HEART: Regular rate and rhythm; No murmurs, rubs, or gallops  ABDOMEN: Soft, Nontender, Nondistended; Bowel sounds present  EXTREMITIES:  2+ Peripheral Pulses, No clubbing, cyanosis, or edema  PSYCH: AAOx3  NEUROLOGY: non-focal  SKIN: No rashes or lesions    A/P:  PAST MEDICAL & SURGICAL HISTORY:  Gait abnormality  Hypothyroidism  Colon cancer: 2000  COPD (chronic obstructive pulmonary disease)  Age-related incipient cataract of both eyes: 2005  H/O colectomy: 2000  HPI:  89yo F PMH of hypothyroidism, COPD on home O2, CHF.  p/w CC cough/SOB. Pt. states she has chronic cough but for past couple of days. mucous feels stuck", reports she tried her albuterol inhaler this AM but did not help. Denies fever chills CP n/v/d came to the emergency room with respiratory distress. Tests positive for viral influenza.  CXR negative for pneumonia and CHF, despite BNP of > 5000.EKG with rapid atrial flutter. Given IV cardizem, IV hydration and BiPap with improvement. Started on tamiflu. Remains alert and awake and able to provide a full history independently (23 Feb 2018 19:35)  #Respiratory acidosis   - Bipap as ordered   - vital sign routine   - Nasal cannula as tolerate   -continuous pulse ox   - Goal of  Care Follow up with Attending DR Mills

## 2018-03-04 NOTE — PROVIDER CONTACT NOTE (OTHER) - ACTION/TREATMENT ORDERED:
NP ordered to decrease patient's Oxygen Nasal Cannula to 2 Liters. Patient's Oxygen Nasal Cannula decreased to 2 Liters. NP ordered to decrease Oxygen Nasal Cannula to 2 Liters. Oxygen Nasal Cannula on 2 Liters. Patient's Respiratory Rate increased to 30's & NP assessing patient. Patient placed on BiPAP & RR 20's. NP ordered to place patient back on BiPAP. Patient placed back on BiPAP as ordered. NP ordered O2 NC 2L. NP assessing patient & patient's Respiratory Rate increased to 30's. NP aware & ordered to place patient back on BiPAP. Patient placed back on BiPAP as ordered. Patient's RR 20. NP aware & at bedside assessing patient. Patient's Respiratory Rate increased to 30's. NP aware & ordered to place patient back on BiPAP. Patient placed back on BiPAP as ordered. Patient's RR 20.

## 2018-03-04 NOTE — CONSULT NOTE ADULT - ATTENDING COMMENTS
89 year old woman with COPD on home O2 admitted with COPD exacerbation likely secondary to influenza infection with superimposed bacterial  pneumonia MICU consult for increased work of breathing     patient is currently on BiPAP appears comfortable but there is a large leak around the mask and would benefit from an appropriately sized mask  she is alert and awake able to protect her airway  component of volume overload would give diuretic  ABG most likely that patient has acute on chronic hypercapnia  continue bronchodilators  not a MICU candidate at this time  please call with questions
The team is commended for bringing for this ethical consult. To meet the statue of removing the  as health care agent, a health care proxy when the patient had capacity would be necessary or some objective issues to suggest that her spouse was not acting in her best interests or substituted judgment. In this case, the autonomous wishes of the patient are being honored by the spouse. Her friends corroborate this. It is ethically prudent to have her legal surrogate, her  consent for both withdrawal and withholding of aggressive treatments including DNR/DNI
Please page [phone] with questions or call the office [phone].

## 2018-03-05 NOTE — CONSULT NOTE ADULT - SUBJECTIVE AND OBJECTIVE BOX
HPI:  89yo F PMH of hypothyroidism, COPD on home O2, CHF.  p/w CC cough/SOB. Pt. states she has chronic cough but for past couple of days. mucous feels stuck", reports she tried her albuterol inhaler this AM but did not help. Denies fever chills CP n/v/d came to the emergency room with respiratory distress. Tests positive for viral influenza.  CXR negative for pneumonia and CHF, despite BNP of > 5000.EKG with rapid atrial flutter. Given IV cardizem, IV hydration and BiPap with improvement. Started on tamiflu. Remains alert and awake and able to provide a full history independently (23 Feb 2018 19:35)    Pt with complicated course.  Now pt on BIPAP, asking to take off.  She is lethargic but arousable and states she is sob and wants bipap off.   Ethics notes appreciated.      PERTINENT PMH REVIEWED:  [ x] YES [ ] NO           SOCIAL HISTORY:   Significant other/partner:  [ x] YES  [ ] NO               Children:  [x ] YES  [ ] NO  stepson                 Hoahaoism/Spirituality:  Substance hx:  [ ] YES   [ x] NO                   Tobacco hx:  [x ] YES  [ ] NO                       Alcohol hx: [ ] YES  [ x] NO         Home Opioid hx:  [ ] YES  [ x] NO   Living Situation: [x ] Home  [ ] Long term care  [ ] Rehab [ ] Other    FAMILY HISTORY:  FAMILY HISTORY:  No pertinent family history in first degree relatives    [ x] Family history non-contributory     BASELINE (I)ADLs (prior to admission):  Centre: [x ] total  [ ] moderate [ ] dependent    ADVANCE DIRECTIVES:    DNR [x ] YES [ ] NO                            [ ] Completed  MOLST  [ ] YES [ ] NO                      [ ] Completed  Health Care Proxy [ ] YES  [ ] NO   [ ] Completed  Living Will  [ ] YES [ ] NO             [x ] Surrogate  [ ] HCP  [ ] Guardian:           Krish Bettencourt                                                        Phone#:  [phone]    ALLERGIES:   Allergies    Avelox (Urticaria; Rash)  Ventolin (Angioedema)    Intolerances        MEDICATIONS:   MEDICATIONS  (STANDING):  ALBUTerol/ipratropium for Nebulization 3 milliLiter(s) Nebulizer every 6 hours  buDESOnide   0.25 milliGRAM(s) Respule 0.25 milliGRAM(s) Inhalation two times a day  cefepime  IVPB 1000 milliGRAM(s) IV Intermittent every 12 hours  cefepime  IVPB      cholecalciferol 1000 Unit(s) Oral two times a day  diltiazem    Tablet 30 milliGRAM(s) Oral every 6 hours  docusate sodium 100 milliGRAM(s) Oral two times a day  furosemide   Injectable 40 milliGRAM(s) IV Push every 12 hours  guaiFENesin  milliGRAM(s) Oral every 12 hours  heparin  Injectable 5000 Unit(s) SubCutaneous every 12 hours  lidocaine   Patch 1 Patch Transdermal daily  methylPREDNISolone sodium succinate Injectable 20 milliGRAM(s) IV Push every 8 hours  morphine  - Injectable 0.5 milliGRAM(s) IV Push every 6 hours  polyethylene glycol 3350 17 Gram(s) Oral daily  senna 2 Tablet(s) Oral at bedtime  tiotropium 18 MICROgram(s) Capsule 1 Capsule(s) Inhalation daily    MEDICATIONS  (PRN):  acetaminophen   Tablet. 650 milliGRAM(s) Oral every 6 hours PRN Moderate Pain (4 - 6)  guaiFENesin    Syrup 200 milliGRAM(s) Oral every 6 hours PRN Cough  morphine  - Injectable 0.5 milliGRAM(s) IV Push every 4 hours PRN tachypnea / respiratory distress  sodium chloride 0.65% Nasal 1 Spray(s) Both Nostrils four times a day PRN Nasal Congestion      PRESENT SYMPTOMS:  Source: [x ] Patient   [ ] Family   [x ] Team     Pain:                        [ x] No [ ] Yes             [ ] Mild [ ] Moderate [ ] Severe    Onset -  Location -  Duration -  Character -  Alleviating/Aggravating -  Radiation -  Timing -      Dyspnea:                [ ] No [ x] Yes             [ ] Mild [ ] Moderate [ x] Severe    Anxiety:                  [ ] No [ x] Yes             [ ] Mild [x ] Moderate [ ] Severe    Fatigue:                  [ ] No [x ] Yes             [ ] Mild [ ] Moderate [ x] Severe    Nausea:                  [x ] No [ ] Yes             [ ] Mild [ ] Moderate [ ] Severe    Loss of appetite:   [x ] No [ ] Yes             [ ] Mild [ ] Moderate [ ] Severe    Constipation:        [ x] No [ ] Yes             [ ] Mild [ ] Moderate [ ] Severe    Other Symptoms:  [ ] All other review of systems negative   [ x] Unable to obtain due to poor mentation     Karnofsky Performance Score/Palliative Performance Status Version 2: 30        %    PHYSICAL EXAM:  Vital Signs Last 24 Hrs  T(C): 36.8 (05 Mar 2018 16:16), Max: 36.9 (04 Mar 2018 21:54)  T(F): 98.2 (05 Mar 2018 16:16), Max: 98.5 (04 Mar 2018 21:54)  HR: 99 (05 Mar 2018 16:16) (90 - 137)  BP: 128/67 (05 Mar 2018 16:16) (100/64 - 147/71)  BP(mean): --  RR: 22 (05 Mar 2018 16:16) (18 - 22)  SpO2: 96% (05 Mar 2018 16:16) (94% - 100%) I&O's Summary    04 Mar 2018 07:01  -  05 Mar 2018 07:00  --------------------------------------------------------  IN: 1050 mL / OUT: 2460 mL / NET: -1410 mL    05 Mar 2018 07:01  -  05 Mar 2018 16:18  --------------------------------------------------------  IN: 120 mL / OUT: 465 mL / NET: -345 mL        General:  [x ] Alert  [ x] Oriented x 2     [x ] Lethargic  [ ] Agitated   [ ] Cachexia   [ ] Unarousable  [ ] Verbal  [ ] Non-Verbal    HEENT:  [ ] Normal   [ ] Dry mouth   [ ] ET Tube    [ ] Trach  [ ] Oral lesions  +BIPAP    Lungs:   [ x] Clear [x ] Tachypnea  [ ] Audible excessive secretions   [ ] Rhonchi        [ ] Right [ ] Left [ ] Bilateral  [ ] Crackles        [ ] Right [ ] Left [ ] Bilateral  [ ] Wheezing     [ ] Right [ ] Left [ ] Bilateral    Cardiovascular:  [ ] Regular [ ] Irregular [x ] Tachycardia   [ ] Bradycardia  [ ] Murmur [ ] Other    Abdomen: [x ] Soft  [ ] Distended   [ x] +BS  [x ] Non tender [ ] Tender  [ ]PEG   [ ]OGT/ NGT   Last BM:       Genitourinary: [ ] Normal [ x] Incontinent   [ ] Oliguria/Anuria   [ ] Mcqueen    Musculoskeletal:  [ ] Normal   [x ] Weakness  [ ] Bedbound/Wheelchair bound [ ] Edema    Neurological: [ ] No focal deficits  [ ] Cognitive impairment  [ ] Dysphagia [ ] Dysarthria [ ] Paresis [x ] Other encephalopathy    Skin: [ x] Normal   [ ] Pressure ulcer(s)                  [ ] Rash    LABS:  [ ] Critical Care time for unstable patient with organ failure.  Total Time:                 minutes  03-05    140  |  91<L>  |  80<H>  ----------------------------<  253<H>  4.9   |  39<H>  |  1.37<H>    Ca    9.4      05 Mar 2018 07:10  Mg     2.5     03-05    TPro  7.3  /  Alb  3.5  /  TBili  0.3  /  DBili  x   /  AST  16  /  ALT  63<H>  /  AlkPhos  66  03-04          Shock: [ ] Septic [ ] Cardiogenic [ ] Neurologic [ ] Hypovolemic  Vasopressors x   Inotrophs x     Protein Calorie Malnutrition: [ ] Mild [ ] Moderate [ ] Severe  Oral Intake: [ ] Unable/mouth care only [ ] Minimal [ ] Moderate [ ] Full Capability  Diet: [ ] NPO [ ] Tube feeds [ ] TPN [ ] Other     RADIOLOGY & ADDITIONAL STUDIES:    REFERRALS:   [ ] Chaplaincy  [ ] Hospice  [ ] Child Life  [ ] Social Work  [ ] Case management [ ] Holistic Therapy

## 2018-03-05 NOTE — SWALLOW BEDSIDE ASSESSMENT ADULT - SWALLOW EVAL: FUNCTIONAL LEVEL AT TIME OF EVAL
RR improved to 30s, patient opening eyes but lethargic. Cxray poor qualtiy. Primary team to follow up with ethics to determine code status. Pt likely will not be able to come off ventilator. Per ethics->Per ethics: Given that Rosalinda and all other known contact have expressed that Mrs. Bettencourt would not want to be intubated if extubation was unlikely to be possible, it is reasonable to rely on his substituted judgement and not intubate the patient but rather ensure that she can have comfort centered care.

## 2018-03-05 NOTE — CONSULT NOTE ADULT - PROBLEM SELECTOR RECOMMENDATION 4
ethics consult and follow up noted  pt is dnr/dni (both  and son agree)  spoke with  over the phone who wishes to focus on comfort and promised her she wouldn't suffer  primary team aware  may be a hospice candidate- will eval in am

## 2018-03-05 NOTE — PROGRESS NOTE ADULT - SUBJECTIVE AND OBJECTIVE BOX
HPI: Patient is more dyspneic and tahypneis having difficulty breathing . She is not getting better even with BIPAP. RRT called to; try and improve breathing.  If she does not arespond then she will need respirator .  Reached out to step son chelsie  in [place] who is her power of .   He says that her aide is the  Pippa's best friend and aide has had discussions of   this topic with her and mrs Bettencourt said that she would    not wish to be on a respirator with no hope of being able to be extubated.  Will make her DNR/DNI while attempting to do everything to improve her health.    MEDICATIONS  (STANDING):  ALBUTerol/ipratropium for Nebulization 3 milliLiter(s) Nebulizer every 6 hours  buDESOnide   0.25 milliGRAM(s) Respule 0.25 milliGRAM(s) Inhalation two times a day  cefepime  IVPB 1000 milliGRAM(s) IV Intermittent every 12 hours  cefepime  IVPB      cholecalciferol 1000 Unit(s) Oral two times a day  diltiazem    Tablet 30 milliGRAM(s) Oral every 6 hours  docusate sodium 100 milliGRAM(s) Oral two times a day  furosemide   Injectable 40 milliGRAM(s) IV Push every 12 hours  guaiFENesin  milliGRAM(s) Oral every 12 hours  heparin  Injectable 5000 Unit(s) SubCutaneous every 12 hours  lidocaine   Patch 1 Patch Transdermal daily  methylPREDNISolone sodium succinate Injectable 20 milliGRAM(s) IV Push every 8 hours  morphine  - Injectable 0.5 milliGRAM(s) IV Push every 6 hours  polyethylene glycol 3350 17 Gram(s) Oral daily  senna 2 Tablet(s) Oral at bedtime  tiotropium 18 MICROgram(s) Capsule 1 Capsule(s) Inhalation daily    MEDICATIONS  (PRN):  acetaminophen   Tablet. 650 milliGRAM(s) Oral every 6 hours PRN Moderate Pain (4 - 6)  guaiFENesin    Syrup 200 milliGRAM(s) Oral every 6 hours PRN Cough  morphine  - Injectable 0.5 milliGRAM(s) IV Push every 4 hours PRN tachypnea / respiratory distress  sodium chloride 0.65% Nasal 1 Spray(s) Both Nostrils four times a day PRN Nasal Congestion          VITALS:   T(C): 36.4 (03-05-18 @ 20:56), Max: 36.8 (03-05-18 @ 07:40)  HR: 98 (03-05-18 @ 22:16) (90 - 113)  BP: 120/55 (03-05-18 @ 22:16) (102/59 - 133/73)  RR: 21 (03-05-18 @ 20:56) (20 - 22)  SpO2: 96% (03-05-18 @ 20:56) (94% - 100%)  Wt(kg): --        LABS:  ABG - ( 05 Mar 2018 09:03 )  pH: 7.39  /  pCO2: 70    /  pO2: 123   / HCO3: 41    / Base Excess: 14.2  /  SaO2: 99              PHYSICAL EXAM:  GENERAL: acute respiratory distress  HEAD:  Atraumatic  EYES: EOM, PERRLA, conjunctiva pink and sclera white  ENT: No tonsillar erythema, exudates, or enlargement, moist mucous membranes, good dentition, no lesions  NECK: Supple, No JVD, normal thyroid, carotids with normal upstrokes and no bruits  CHEST/LUNG: scattered rales and rhonchi  HEART: irreg ireg wwwtih RVR  ABDOMEN: Soft, nondistended, no masses, guarding, tenderness or rebound, bowel sounds present  EXTREMITIES:  2+ Peripheral Pulses, No clubbing, cyanosis, or edema. No arthritis of shoulders, elbows, hands, hips, knees, ankles, or feet. No DJD C spine, T spine, or L/S spine  LYMPH: No lymphadenopathy noted  SKIN: No rashes or lesions  NERVOUS SYSTEM:  Alert & Oriented X3, normal cognitive function. Motor Strength 5/5 right upper and right lower.  5/5 left upper and left lower extremities, DTRs 2+ intact and symmetric    CARDIAC MARKERS ( 04 Mar 2018 17:04 )  x     / 0.06 ng/mL / 49 U/L / x     / 4.0 ng/mL      CBC Full  -  ( 05 Mar 2018 07:08 )  WBC Count : 15.6 K/uL  Hemoglobin : 10.4 g/dL  Hematocrit : 32.8 %  Platelet Count - Automated : 250 K/uL  Mean Cell Volume : 98.5 fl  Mean Cell Hemoglobin : 31.3 pg  Mean Cell Hemoglobin Concentration : 31.7 gm/dL  Auto Neutrophil # : x  Auto Lymphocyte # : x  Auto Monocyte # : x  Auto Eosinophil # : x  Auto Basophil # : x  Auto Neutrophil % : x  Auto Lymphocyte % : x  Auto Monocyte % : x  Auto Eosinophil % : x  Auto Basophil % : x    03-05    140  |  91<L>  |  80<H>  ----------------------------<  253<H>  4.9   |  39<H>  |  1.37<H>    Ca    9.4      05 Mar 2018 07:10  Mg     2.5     03-05    TPro  7.3  /  Alb  3.5  /  TBili  0.3  /  DBili  x   /  AST  16  /  ALT  63<H>  /  AlkPhos  66  03-04    LIVER FUNCTIONS - ( 04 Mar 2018 07:00 )  Alb: 3.5 g/dL / Pro: 7.3 g/dL / ALK PHOS: 66 U/L / ALT: 63 U/L RC / AST: 16 U/L / GGT: x               CAPILLARY BLOOD GLUCOSE          RADIOLOGY & ADDITIONAL TESTS:

## 2018-03-05 NOTE — SWALLOW BEDSIDE ASSESSMENT ADULT - SWALLOW EVAL: DIAGNOSIS
Chart reviewed and case d/w NP: Mya. Dysphagia w/u is not clinically appropriate at present time. Service will not actively follow. Please re-consult if pt deemed appropriate.

## 2018-03-05 NOTE — CHART NOTE - NSCHARTNOTESELECT_GEN_ALL_CORE
ABG hypercarbic/Event Note
-150/Event Note
ABG/Event Note
Ethics/Event Note
Event Note
Event Note/Hypoxia
Event Note/Medicine NP
Event Note/SOB
Event Note/Tachypnea
Event Note/hypercapnea
PAT 2 sec/Event Note
Rapid Response/MAR
Worsening SOB/Event Note
rapid afib/Aflutter/Event Note
tachypnea/Event Note

## 2018-03-05 NOTE — CHART NOTE - NSCHARTNOTEFT_GEN_A_CORE
JU HWANG    Notified by RN patient with critical lab result abnormal ABG and tachypnea   ABG - ( 05 Mar 2018 09:03 )  pH: 7.39  /  pCO2: 70    /  pO2: 123   / HCO3: 41    / Base Excess: 14.2  /  SaO2: 99          Vital Signs Last 24 Hrs  T(C): 36.8 (05 Mar 2018 16:16), Max: 36.9 (04 Mar 2018 21:54)  T(F): 98.2 (05 Mar 2018 16:16), Max: 98.5 (04 Mar 2018 21:54)  HR: 99 (05 Mar 2018 16:16) (90 - 137)  BP: 128/67 (05 Mar 2018 16:16) (108/64 - 147/71)  BP(mean): --  RR: 22 (05 Mar 2018 16:16) (18 - 22)  SpO2: 96% (05 Mar 2018 16:16) (94% - 100%)                        10.4   15.6  )-----------( 250      ( 05 Mar 2018 07:08 )             32.8   03-05    140  |  91<L>  |  80<H>  ----------------------------<  253<H>  4.9   |  39<H>  |  1.37<H>    Ca    9.4      05 Mar 2018 07:10  Mg     2.5     03-05    TPro  7.3  /  Alb  3.5  /  TBili  0.3  /  DBili  x   /  AST  16  /  ALT  63<H>  /  AlkPhos  66  03-04  HPI:  89yo F PMH of hypothyroidism, COPD on home O2, CHF.  p/w CC cough/SOB. Pt. states she has chronic cough but for past couple of days. mucous feels stuck", reports she tried her albuterol inhaler this AM but did not help. Denies fever chills CP n/v/d came to the emergency room with respiratory distress. Tests positive for viral influenza.  CXR negative for pneumonia and CHF, despite BNP of > 5000.EKG with rapid atrial flutter. Given IV cardizem, IV hydration and BiPap with improvement. Started on tamiflu. Remains alert and awake and able to provide a full history independently (23 Feb 2018 19:35)  Admitted   ACute hypercapneic resp failure   Dyspnea with compensated hyypercapnea  INfluenza AH3 viral infection  COPD exacerbation  RLL post viral pneumonia: HCAP  CXR with superimposed CHF and prob bilateral effusions  Interventions taken   DC azithromycin; complete cefepeime on 3/6  Continue Solumedrol 20 IV q8  Duoneb and pulmicort  Nasal oxygen to at >90%  COntinue BIPAP - tranistion to nasal O2 as tolerated  DNR/DNI : comfort measures; prognosis grave  Pulmonary Appreciated  Discussed above plan with Dr Emanuel agreed with above plan   Palliative appreciate   Ethnic Appreciated                                 Celine Moreno NP-C

## 2018-03-05 NOTE — CONSULT NOTE ADULT - PROBLEM SELECTOR RECOMMENDATION 9
at request of family, pt asked to remove bipap  aware that pt may die without it- they are aware and want to focus on comfort  discussed with primary attending who agreed to removal  continue 02 as tolerated  added low dose morphine for dyspnea- may hold for sedation
As per Family Healthcare Decision Act spouse is the moral agent

## 2018-03-05 NOTE — CHART NOTE - NSCHARTNOTEFT_GEN_A_CORE
Went by to speak with patient at bedside, patient simply able to verbally acknowledge presence of ethics fellow, and nothing more. Clearly lacks capacity. Visited patient's  Krish Bettencourt who is also hospitalized.  immediately asked if his wife was still alive, fearing she may have passed overnight, and hoping he would have another chance to see her. He stated that they had been  for the past 40 years and despite them disagreeing much more over the past few years, that he loved her dearly and did not regret leaving his prior wife for her. When asked about views on code status for his wife, Mr. Bettencourt stated that he supports the DNR/DNI status saying that the last few years have been very difficult for his wife, as her health and mentation have declined. He states that it's painful to see her suffer and that he strongly believes she would not want chest compressions or intubation. This view has been further corroborated by Mrs. Bettencourt's stepson.

## 2018-03-05 NOTE — SWALLOW BEDSIDE ASSESSMENT ADULT - SLP PERTINENT HISTORY OF CURRENT PROBLEM
89yo F PMH of hypothyroidism, COPD on home O2, CHF.  p/w CC cough/SOB. Pt. states she has chronic cough but for past couple of days. mucous feels stuck", reports she tried her albuterol inhaler this AM but did not help. Denies fever chills CP n/v/d came to the emergency room with respiratory distress. Tests positive for viral influenza.  CXR negative for pneumonia and CHF, despite BNP of > 5000.EKG with rapid atrial flutter. Given IV cardizem, IV hydration and BiPap with improvement. Started on tamiflu. Remains alert and awake and able to provide a full history independently. Per Attending statement, New onset of viral influenza with acute respiratory distress, dehydration, hypoxia, rapid atrial flutter.
89yo F PMH of hypothyroidism, COPD on home O2, CHF.  p/w CC cough/SOB. Pt. states she has chronic cough but for past couple of days. mucous feels stuck", reports she tried her albuterol inhaler this AM but did not help. Denies fever chills CP n/v/d came to the emergency room with respiratory distress. Tests positive for viral influenza.  CXR negative for pneumonia and CHF, despite BNP of > 5000.EKG with rapid atrial flutter. Given IV cardizem, IV hydration and BiPap with improvement. Started on tamiflu. Remains alert and awake and able to provide a full history independently. Per Attending statement, New onset of viral influenza with acute respiratory distress, dehydration, hypoxia, rapid atrial flutter.

## 2018-03-05 NOTE — PROGRESS NOTE ADULT - ATTENDING COMMENTS
Patient with worsening pulmonary functio. Patient is DNR. will move towards comfort care   discussed with palliative care

## 2018-03-05 NOTE — PROVIDER CONTACT NOTE (CRITICAL VALUE NOTIFICATION) - ACTION/TREATMENT ORDERED:
NP assessing patient. NP reviewed all of patient's lab results and orders. NP consulted with MD Kimble. Palliative Consulted. DNR and DNI Status maintained as ordered.

## 2018-03-05 NOTE — PROGRESS NOTE ADULT - SUBJECTIVE AND OBJECTIVE BOX
Follow-up Pulm Progress Note  Demian Kimble MD  [phone]    Events noted: Notes reviewed  AB.39/79/123 - no significangt change, stable compensated hypercapnea  Afebrile day #6/7 antibiotics  Creatinine increasing  Currently DNR/DNI  Episode of dyspnea somwehat resolved: remains on BIPAP      Vital Signs Last 24 Hrs  T(C): 36.8 (05 Mar 2018 07:40), Max: 36.9 (04 Mar 2018 21:54)  T(F): 98.2 (05 Mar 2018 07:40), Max: 98.5 (04 Mar 2018 21:54)  HR: 95 (05 Mar 2018 07:40) (95 - 137)  BP: 118/67 (05 Mar 2018 07:40) (100/64 - 147/71)  BP(mean): --  RR: 21 (05 Mar 2018 07:40) (18 - 22)  SpO2: 96% (05 Mar 2018 07:40) (94% - 98%)    ABG - ( 05 Mar 2018 09:03 )  pH: 7.39  /  pCO2: 70    /  pO2: 123   / HCO3: 41    / Base Excess: 14.2  /  SaO2: 99                              10.4   15.6  )-----------( 250      ( 05 Mar 2018 07:08 )             32.8       03-05    140  |  91<L>  |  80<H>  ----------------------------<  253<H>  4.9   |  39<H>  |  1.37<H>    Ca    9.4      05 Mar 2018 07:10  Mg     2.5     03-05    TPro  7.3  /  Alb  3.5  /  TBili  0.3  /  DBili  x   /  AST  16  /  ALT  63<H>  /  AlkPhos  66  03-04    Procalcitonin, Serum: 0.76 ng/mL (18 @ 15:00)    Serum Pro-Brain Natriuretic Peptide: 5807 pg/mL (18 @ 15:00)      CULTURES:  Culture Results:   No growth ( @ 09:10)  Culture Results:   No growth to date. ( @ 13:18)  Culture Results:   No growth to date. ( @ 13:18)    Most recent blood culture --  @ 09:10   -- -- .Urine Clean Catch (Midstream)  @ 09:10  Most recent blood culture --  @ 13:18   -- -- .Blood Blood-Peripheral  @ 13:18      Physical Examination: Lethargic on BIPAP  PULM: Decreased excursion with R diminished BS  CVS: 2/6 holosytolic; S1 S2  ABD: Soft, non-tender  EXT:  No clubbing, cyanosis, or edema    RADIOLOGY REVIEWED  CXR: R>L effusion, pulm vasc mónica, R 1/2 opacity    CT chest:    TTE:

## 2018-03-05 NOTE — CONSULT NOTE ADULT - PROBLEM SELECTOR PROBLEM 1
Acute on chronic respiratory failure with hypoxia and hypercapnia
Patient does not have healthcare proxy

## 2018-03-05 NOTE — SWALLOW BEDSIDE ASSESSMENT ADULT - COMMENTS
HX contd: HC: 2/24 Per Chart notes: Pt w/ sinus jamie down to 48 immediately back up to 80s. Then PAT for 2 secs with HR in 140. Then O2 sat: 50s on the pulse ox. Pt was seen and examined by  the bedside. Pt is asymptomatic. Pt was on NC 3L since AM. Pt was on BiPAP overnight for hypoxia. Pt changed to BiPAP and repositioned and the O2 sat improving to 93%. 2/25 Per Chart note pt c/o SOB on bipap. PT states "I can't breathe I cant breathe." Pt seen and examined at bedside. Pt frail elderly on bipap 13/5 FiO2 35% Tachypneic RR 40s, pt never hypoxic. 2/27 Seen by Pulm, Influenza A viral infection with COPD exacerbation. Hypercapneic resp failure: acute on chronic - ABG today appears compensated. Rx r/o RLL pneumonia. AF with intermittent RVR and incr PBNP - possible underlying structural CHF. 2/28 Per Med, pt w/ new onset of RLL pneumonia. Pt on azithromycin and Cefepime for HCAP. To continue steroids and bronchodilators. Needs to start PT to regain strength and stamina. 3/1 Per Chart note pt c/o chest tightness and was sustaining 140's to 150's on tele. At bedside EKG showed aflutter 121 and heart rate decreased further within minutes and patients symptoms resolved spontaneously. Per Pulm RLL post viral pneumonia: HCAP. BIPAP only for dyspnea relief: PCO2 is compensated. 3/2 Seen by Cards re Afib w/ RVR. AF likely driven by her underlying illness. Agree with cardizem. Increase to 60 mg PO q6 hours. If BP precludes further increase would consider amio. 3/3 Per Chart note pt noted to be in worsening SOB, o3sat 92 to 93% on O2Nc 3l/mt, tachypneic. ABG critical PCO2 lab value 85%. BIPAP ordered. MICU consulted.  3/4:  RRT called for respiratory distress->Pulm note reviewed-poor candidate for intubation. ICU team at bedside-intubation not recommended. ABG unchanged. Baseline hypercapnia but compensating-likely not contributing to mental status.
HX contd: HC: 2/24 Per Chart notes: Pt w/ sinus jamie down to 48 immediately back up to 80s. Then PAT for 2 secs with HR in 140. Then O2 sat: 50s on the pulse ox. Pt was seen and examined by  the bedside. Pt is asymptomatic. Pt was on NC 3L since AM. Pt was on BiPAP overnight for hypoxia. Pt changed to BiPAP and repositioned and the O2 sat improving to 93%. 2/25 Per Chart note pt c/o SOB on bipap. PT states "I can't breathe I cant breathe." Pt seen and examined at bedside. Pt frail elderly on bipap 13/5 FiO2 35% Tachypneic RR 40s, pt never hypoxic. 2/27 Seen by Pulm, Influenza A viral infection with COPD exacerbation. Hypercapneic resp failure: acute on chronic - ABG today appears compensated. Rx r/o RLL pneumonia. AF with intermittent RVR and incr PBNP - possible underlying structural CHF. 2/28 Per Med, pt w/ new onset of RLL pneumonia. Pt on azithromycin and Cefepime for HCAP. To continue steroids and bronchodilators. Needs to start PT to regain strength and stamina. 3/1 Per Chart note pt c/o chest tightness and was sustaining 140's to 150's on tele. At bedside EKG showed aflutter 121 and heart rate decreased further within minutes and patients symptoms resolved spontaneously. Per Pulm RLL post viral pneumonia: HCAP. BIPAP only for dyspnea relief: PCO2 is compensated. 3/2 Seen by Cards re Afib w/ RVR. AF likely driven by her underlying illness. Agree with cardizem. Increase to 60 mg PO q6 hours. If BP precludes further increase would consider amio. 3/3 Per Chart note pt noted to be in worsening SOB, o3sat 92 to 93% on O2Nc 3l/mt, tachypneic. ABG critical PCO2 lab value 85%. BIPAP ordered. MICU consulted.

## 2018-03-05 NOTE — PROVIDER CONTACT NOTE (OTHER) - ASSESSMENT
Patient Alert and Oriented times Three. Patient on BiPAP as ordered. Patient's Oxygen Saturation 95%. Patient Anxious. Patient insisting on removing BiPAP and stating that she wants to go home.

## 2018-03-05 NOTE — PROGRESS NOTE ADULT - SUBJECTIVE AND OBJECTIVE BOX
CC: PNA, Flu, AF    Interval History: Worsening resp status.     MEDICATIONS:  acetaminophen   Tablet. 650 milliGRAM(s) Oral every 6 hours PRN  ALBUTerol/ipratropium for Nebulization 3 milliLiter(s) Nebulizer every 6 hours  buDESOnide   0.25 milliGRAM(s) Respule 0.25 milliGRAM(s) Inhalation two times a day  cefepime  IVPB 1000 milliGRAM(s) IV Intermittent every 12 hours  cefepime  IVPB      cholecalciferol 1000 Unit(s) Oral two times a day  diltiazem    Tablet 30 milliGRAM(s) Oral every 6 hours  docusate sodium 100 milliGRAM(s) Oral two times a day  furosemide   Injectable 40 milliGRAM(s) IV Push every 12 hours  guaiFENesin    Syrup 200 milliGRAM(s) Oral every 6 hours PRN  guaiFENesin  milliGRAM(s) Oral every 12 hours  heparin  Injectable 5000 Unit(s) SubCutaneous every 12 hours  lidocaine   Patch 1 Patch Transdermal daily  methylPREDNISolone sodium succinate Injectable 20 milliGRAM(s) IV Push every 8 hours  morphine  - Injectable 0.5 milliGRAM(s) IV Push every 4 hours PRN  morphine  - Injectable 0.5 milliGRAM(s) IV Push every 6 hours  polyethylene glycol 3350 17 Gram(s) Oral daily  senna 2 Tablet(s) Oral at bedtime  sodium chloride 0.65% Nasal 1 Spray(s) Both Nostrils four times a day PRN  tiotropium 18 MICROgram(s) Capsule 1 Capsule(s) Inhalation daily      LABS:      140  |  91<L>  |  80<H>  ----------------------------<  253<H>  4.9   |  39<H>  |  1.37<H>    Ca    9.4      05 Mar 2018 07:10  Mg     2.5                                 10.4   15.6  )-----------( 250      ( 05 Mar 2018 07:08 )             32.8       CARDIAC MARKERS ( 04 Mar 2018 17:04 )  x     / 0.06 ng/mL / 49 U/L / x     / 4.0 ng/mL          VITAL SIGNS:   T(C): 36.7 (18 @ 04:46), Max: 36.8 (18 @ 07:40)  HR: 105 (18 @ 06:36) (90 - 111)  BP: 110/54 (18 @ 06:36) (102/59 - 133/73)  RR: 20 (18 @ 04:46) (20 - 22)  SpO2: 98% (18 @ 04:46) (94% - 100%)  Daily     Daily Weight in k.1 (06 Mar 2018 06:58)  I&O's Summary    05 Mar 2018 07:01  -  06 Mar 2018 07:00  --------------------------------------------------------  IN: 510 mL / OUT: 1715 mL / NET: -1205 mL        TELE: AF around 100-120.

## 2018-03-06 NOTE — PROVIDER CONTACT NOTE (CRITICAL VALUE NOTIFICATION) - NAME OF MD/NP/PA/DO NOTIFIED:
Lupe Mercado
NP Bhavesh Reilly
NP Madhavi Beck
NP Madhavi Beck
NP Ramón Beck
NP Ramón Beck
NP Madhavi Beck

## 2018-03-06 NOTE — PROVIDER CONTACT NOTE (CRITICAL VALUE NOTIFICATION) - SITUATION
ABG critical PCO2 lab value 85%
Abnormal labs.
PCO2-73
PCo2-77
Patient's Blood Gas Profile - Arterial Resulted.
Patient's pCO2 75
pCO2: 82

## 2018-03-06 NOTE — PROVIDER CONTACT NOTE (CRITICAL VALUE NOTIFICATION) - BACKGROUND
Patient admitted for Acute on chronic respiratory failure with hypoxia and hypercapnia
Patient admitted for PNA with history of COPD.
Patient admitted with Acute on Chronic Respiratory Failure with Hypoxia, Pneumonia, Chronic Obstructive Pulmonary Disease, Atrial Flutter, Influenza.
Patient admitted with COPD,Flu A
Patient admitted with Fu A,COPD.
pt admitted with Acute Respiratory failure and hypoxia. H.O COPD, hypothyroidism
Patient admitted for SOB, cough, respiratory failure, PNA, flu.  PMH of COPD, hypothyroidism, colon cancer, CHF

## 2018-03-06 NOTE — ADVANCED PRACTICE NURSE CONSULT - ASSESSMENT
HCN Admissions LAMONT Bae arranged to meet with pt and spouse and pt's private hire [facility] on 3/8 to discuss hospice care.  Pt wishes to return home under hospice care.  Unable to discuss with pt's spouse by phone due to his hearing impairment.

## 2018-03-06 NOTE — PROVIDER CONTACT NOTE (CRITICAL VALUE NOTIFICATION) - PERSON GIVING RESULT:
: Cally Branch
Biju Angelo
Carlos Eduardo-Chayo Reddy
Chayo Reddy
Joanne Thibodeaux
[facility]-August Peng
Francis Hope, STAT lab

## 2018-03-06 NOTE — PROGRESS NOTE ADULT - SUBJECTIVE AND OBJECTIVE BOX
Follow-up Pulm Progress Note  Demian Kimble MD  [phone]    Events noted: Notes reviewed  On nasal O2 with intermittent delirium, verbally responsive, does not appear tachypneic  On standing MSO4 .5 q6  Has completed full course of antibiotics    Vital Signs Last 24 Hrs  T(C): 36.8 (06 Mar 2018 09:22), Max: 36.8 (05 Mar 2018 12:57)  T(F): 98.2 (06 Mar 2018 09:22), Max: 98.3 (05 Mar 2018 12:57)  HR: 99 (06 Mar 2018 10:34) (92 - 111)  BP: 133/72 (06 Mar 2018 10:34) (102/59 - 133/73)  BP(mean): --  RR: 20 (06 Mar 2018 09:22) (20 - 22)  SpO2: 96% (06 Mar 2018 09:22) (94% - 98%)    ABG - ( 06 Mar 2018 05:30 )  pH: 7.36  /  pCO2: 82    /  pO2: 98    / HCO3: 45    / Base Excess: 17.2  /  SaO2: 98                              9.7    14.4  )-----------( 215      ( 06 Mar 2018 07:12 )             31.0       03-06    142  |  93<L>  |  93<H>  ----------------------------<  259<H>  4.9   |  43<H>  |  1.45<H>    Ca    9.3      06 Mar 2018 07:12  Mg     2.5     03-05  Serum Pro-Brain Natriuretic Peptide: 5807 pg/mL (02-27-18 @ 15:00)      CULTURES:  Culture Results:   No growth (02-24 @ 09:10)  Culture Results:   No growth to date. (02-23 @ 13:18)  Culture Results:   No growth to date. (02-23 @ 13:18)    Most recent blood culture -- 02-24 @ 09:10   -- -- .Urine Clean Catch (Midstream) 02-24 @ 09:10  Most recent blood culture -- 02-23 @ 13:18   -- -- .Blood Blood-Peripheral 02-23 @ 13:18      Physical Examination:   PULM: Decreased excursion with R diminished BS  CVS: S1 S2  ABD: Soft, non-tender  EXT:  No clubbing, cyanosis, or edema    RADIOLOGY REVIEWED  CXR: R>L effusion, pulm vasc mónica, R 1/2 opacity    CT chest:    TTE:

## 2018-03-06 NOTE — PROGRESS NOTE ADULT - PROBLEM SELECTOR PLAN 1
off bipap  on 02 as tolerated  low dose morphine atc- will reassess tomorrow, if continues to do well will taper off to prn

## 2018-03-06 NOTE — PROGRESS NOTE ADULT - SUBJECTIVE AND OBJECTIVE BOX
INTERVAL HPI/OVERNIGHT EVENTS:  Pt in bed complaining she wants to get out.  HHA at her side.  States this is her baseline.      Allergies    Avelox (Urticaria; Rash)  Ventolin (Angioedema)    Intolerances        ADVANCE DIRECTIVES:    DNR: [ ] NO [ ] YES (Date) MOLST [ ] NO [ ] YES (Date)    MEDICATIONS  (STANDING):  ALBUTerol/ipratropium for Nebulization 3 milliLiter(s) Nebulizer every 6 hours  buDESOnide   0.25 milliGRAM(s) Respule 0.25 milliGRAM(s) Inhalation two times a day  cholecalciferol 1000 Unit(s) Oral two times a day  diltiazem    Tablet 30 milliGRAM(s) Oral every 6 hours  docusate sodium 100 milliGRAM(s) Oral two times a day  furosemide   Injectable 40 milliGRAM(s) IV Push every 12 hours  guaiFENesin  milliGRAM(s) Oral every 12 hours  heparin  Injectable 5000 Unit(s) SubCutaneous every 12 hours  lidocaine   Patch 1 Patch Transdermal daily  methylPREDNISolone sodium succinate Injectable 20 milliGRAM(s) IV Push every 8 hours  morphine  - Injectable 0.5 milliGRAM(s) IV Push every 6 hours  polyethylene glycol 3350 17 Gram(s) Oral daily  senna 2 Tablet(s) Oral at bedtime  tiotropium 18 MICROgram(s) Capsule 1 Capsule(s) Inhalation daily    MEDICATIONS  (PRN):  acetaminophen   Tablet. 650 milliGRAM(s) Oral every 6 hours PRN Moderate Pain (4 - 6)  guaiFENesin    Syrup 200 milliGRAM(s) Oral every 6 hours PRN Cough  morphine  - Injectable 0.5 milliGRAM(s) IV Push every 4 hours PRN tachypnea / respiratory distress  sodium chloride 0.65% Nasal 1 Spray(s) Both Nostrils four times a day PRN Nasal Congestion      PRESENT SYMPTOMS:  Source: [ x] Patient   [ ] Family   [x ] Team     Pain:                        [ x] No [ ] Yes             [ ] Mild [ ] Moderate [ ] Severe    Onset -  Location -  Duration -  Character -  Alleviating/Aggravating -  Radiation -  Timing -    Dyspnea:                [x ] No [ ] Yes             [ ] Mild [ ] Moderate [ ] Severe    Anxiety:                  [ ] No [x ] Yes             [ ] Mild [x ] Moderate [ ] Severe    Fatigue:                  [ ] No [x ] Yes             [ ] Mild [x ] Moderate [ ] Severe    Nausea:                  [x ] No [ ] Yes             [ ] Mild [ ] Moderate [ ] Severe    Loss of appetite:   [ x] No [ ] Yes             [ ] Mild [ ] Moderate [ ] Severe    Constipation:        [ x] No [ ] Yes             [ ] Mild [ ] Moderate [ ] Severe    Other Symptoms:  [ ] All other review of systems negative   [x ] Unable to obtain due to poor mentation     Karnofsky Performance Score/Palliative Performance Status Version 2:     30    %    PHYSICAL EXAM:  Vital Signs Last 24 Hrs  T(C): 36.8 (06 Mar 2018 11:23), Max: 36.8 (05 Mar 2018 16:16)  T(F): 98.3 (06 Mar 2018 11:23), Max: 98.3 (06 Mar 2018 11:23)  HR: 104 (06 Mar 2018 11:23) (93 - 111)  BP: 136/70 (06 Mar 2018 11:23) (102/59 - 136/70)  BP(mean): --  RR: 21 (06 Mar 2018 11:23) (20 - 22)  SpO2: 96% (06 Mar 2018 11:23) (96% - 98%) I&O's Summary    05 Mar 2018 07:01  -  06 Mar 2018 07:00  --------------------------------------------------------  IN: 510 mL / OUT: 1715 mL / NET: -1205 mL    06 Mar 2018 07:01  -  06 Mar 2018 13:56  --------------------------------------------------------  IN: 656 mL / OUT: 395 mL / NET: 261 mL        General:  [ x] Alert  [x ] Oriented x 1-2     [x ] Lethargic  [ ] Agitated   [ ] Cachexia   [ ] Unarousable  [ ] Verbal  [ ] Non-Verbal    HEENT:  [x ] Normal   [ ] Dry mouth   [ ] ET Tube    [ ] Trach  [ ] Oral lesions    Lungs:   [x ] Clear [ ] Tachypnea  [ ] Audible excessive secretions   [ ] Rhonchi        [ ] Right [ ] Left [ ] Bilateral  [ ] Crackles        [ ] Right [ ] Left [ ] Bilateral  [ ] Wheezing     [ ] Right [ ] Left [ ] Bilateral    Cardiovascular:  [ ] Regular [ ] Irregular [x ] Tachycardia   [ ] Bradycardia  [ ] Murmur [ ] Other    Abdomen: [x ] Soft  [ ] Distended   [x ] +BS  [ x] Non tender [ ] Tender  [ ]PEG   [ ]OGT/ NGT   Last BM:       Genitourinary: [ ] Normal [x ] Incontinent   [ ] Oliguria/Anuria   [ ] Mcqueen    Musculoskeletal:  [ ] Normal   [ ] Weakness  [x ] Bedbound/Wheelchair bound [ ] Edema    Neurological: [ ] No focal deficits  [ ] Cognitive impairment  [ ] Dysphagia [ ] Dysarthria [ ] Paresis [x ] Other encephlopathy slowing resolving    Skin: [x ] Normal   [ ] Pressure ulcer(s)                  [ ] Rash    LABS:  [ ] Critical Care time for unstable patient with organ failure.  Total Time:                 minutes  03-06    142  |  93<L>  |  93<H>  ----------------------------<  259<H>  4.9   |  43<H>  |  1.45<H>    Ca    9.3      06 Mar 2018 07:12  Mg     2.5     03-05            Shock: [ ] Septic [ ] Cardiogenic [ ] Neurologic [ ] Hypovolemic  Vasopressors x   Inotrophs x     Oral Intake: [ ] Unable/mouth care only [ ] Minimal [ ] Moderate [ ] Full Capability  Diet: [ ] NPO [ ] Tube feeds [ ] TPN [ ] Other     RADIOLOGY & ADDITIONAL STUDIES:    REFERRALS:   [ ] Chaplaincy  [ ] Hospice  [ ] Child Life  [ ] Social Work  [ ] Case management [ ] Holistic Therapy       RADIOLOGY & ADDITIONAL STUDIES:

## 2018-03-06 NOTE — PROVIDER CONTACT NOTE (CRITICAL VALUE NOTIFICATION) - ACTION/TREATMENT ORDERED:
NP notified and aware.  Patient has been educated and informed of purpose of BiPAP but still refusing.  No new orders at this time

## 2018-03-06 NOTE — PROGRESS NOTE ADULT - ATTENDING COMMENTS
Patient with worsening pulmonary functio. Patient is DNR. will move towards comfort care   discussed with palliative care. Suggest home hospice care in accordance with the patient's wishes.

## 2018-03-06 NOTE — PROGRESS NOTE ADULT - SUBJECTIVE AND OBJECTIVE BOX
HPI: Patient is more dyspneic and tachypnia having difficulty breathing . She is not getting better even with BIPAP and it has been stopped. Reached out to step son chelsie  in [place] who is her power of .   He says that her aide is the  Pippa's best friend and aide has had discussions of   this topic with her and mrs Bettencourt said that she would  not wish to be on a respirator with no hope of being able to be extubated.  Now DNR/DNI while attempting to maintain comfort. Patient is on nasal oxygen and more verbal today.    MEDICATIONS  (STANDING):  ALBUTerol/ipratropium for Nebulization 3 milliLiter(s) Nebulizer every 6 hours  buDESOnide   0.25 milliGRAM(s) Respule 0.25 milliGRAM(s) Inhalation two times a day  cholecalciferol 1000 Unit(s) Oral two times a day  diltiazem    Tablet 30 milliGRAM(s) Oral every 6 hours  docusate sodium 100 milliGRAM(s) Oral two times a day  furosemide   Injectable 40 milliGRAM(s) IV Push every 12 hours  guaiFENesin  milliGRAM(s) Oral every 12 hours  heparin  Injectable 5000 Unit(s) SubCutaneous every 12 hours  lidocaine   Patch 1 Patch Transdermal daily  methylPREDNISolone sodium succinate Injectable 20 milliGRAM(s) IV Push every 8 hours  morphine  - Injectable 0.5 milliGRAM(s) IV Push every 6 hours  polyethylene glycol 3350 17 Gram(s) Oral daily  senna 2 Tablet(s) Oral at bedtime  tiotropium 18 MICROgram(s) Capsule 1 Capsule(s) Inhalation daily    MEDICATIONS  (PRN):  acetaminophen   Tablet. 650 milliGRAM(s) Oral every 6 hours PRN Moderate Pain (4 - 6)  guaiFENesin    Syrup 200 milliGRAM(s) Oral every 6 hours PRN Cough  morphine  - Injectable 0.5 milliGRAM(s) IV Push every 4 hours PRN tachypnea / respiratory distress  sodium chloride 0.65% Nasal 1 Spray(s) Both Nostrils four times a day PRN Nasal Congestion      Vital Signs Last 24 Hrs  T(C): 36.8 (06 Mar 2018 16:01), Max: 36.8 (06 Mar 2018 09:22)  T(F): 98.2 (06 Mar 2018 16:01), Max: 98.3 (06 Mar 2018 11:23)  HR: 90 (06 Mar 2018 16:45) (90 - 111)  BP: 130/68 (06 Mar 2018 16:45) (102/59 - 136/70)  BP(mean): --  RR: 19 (06 Mar 2018 16:45) (19 - 21)  SpO2: 96% (06 Mar 2018 16:45) (96% - 98%)      LABS:  ABG - ( 05 Mar 2018 09:03 )  pH: 7.39  /  pCO2: 70    /  pO2: 123   / HCO3: 41    / Base Excess: 14.2  /  SaO2: 99              PHYSICAL EXAM:  GENERAL: acute respiratory distress  HEAD:  Atraumatic  EYES: EOM, PERRLA, conjunctiva pink and sclera white  ENT: No tonsillar erythema, exudates, or enlargement, moist mucous membranes, good dentition, no lesions  NECK: Supple, No JVD, normal thyroid, carotids with normal upstrokes and no bruits  CHEST/LUNG: scattered rales and rhonchi respiratory rate is 30 and labored  HEART: irreg ireg [id number] RVR  ABDOMEN: Soft, nondistended, no masses, guarding, tenderness or rebound, bowel sounds present  EXTREMITIES:  2+ Peripheral Pulses, No clubbing, cyanosis, or edema. No arthritis of shoulders, elbows, hands, hips, knees, ankles, or feet. No DJD C spine, T spine, or L/S spine  LYMPH: No lymphadenopathy noted  SKIN: No rashes or lesions  NERVOUS SYSTEM:  Alert & Oriented X3, normal cognitive function. Motor Strength 5/5 right upper and right lower.  5/5 left upper and left lower extremities, DTRs 2+ intact and symmetric    CARDIAC MARKERS ( 04 Mar 2018 17:04 )  x     / 0.06 ng/mL / 49 U/L / x     / 4.0 ng/mL    ABG - ( 06 Mar 2018 05:30 )  pH: 7.36  /  pCO2: 82    /  pO2: 98    / HCO3: 45    / Base Excess: 17.2  /  SaO2: 98                    CBC Full  -  ( 06 Mar 2018 07:12 )  WBC Count : 14.4 K/uL  Hemoglobin : 9.7 g/dL  Hematocrit : 31.0 %  Platelet Count - Automated : 215 K/uL  Mean Cell Volume : 99.3 fl  Mean Cell Hemoglobin : 31.2 pg  Mean Cell Hemoglobin Concentration : 31.5 gm/dL  Auto Neutrophil # : x  Auto Lymphocyte # : x  Auto Monocyte # : x  Auto Eosinophil # : x  Auto Basophil # : x  Auto Neutrophil % : x  Auto Lymphocyte % : x  Auto Monocyte % : x  Auto Eosinophil % : x  Auto Basophil % : x    03-06    142  |  93<L>  |  93<H>  ----------------------------<  259<H>  4.9   |  43<H>  |  1.45<H>    Ca    9.3      06 Mar 2018 07:12  Mg     2.5     03-05                  RADIOLOGY & ADDITIONAL TESTS:

## 2018-03-07 NOTE — PROGRESS NOTE ADULT - SUBJECTIVE AND OBJECTIVE BOX
Follow-up Pulm Progress Note  Demian Kimble MD  [phone]    Events noted: Notes reviewed    Lethargic/sleepy, mildy tachypneic  No change in compensated hypercapnea    Vital Signs Last 24 Hrs  T(C): 36.5 (07 Mar 2018 05:09), Max: 36.8 (06 Mar 2018 11:23)  T(F): 97.7 (07 Mar 2018 05:09), Max: 98.3 (06 Mar 2018 11:23)  HR: 102 (07 Mar 2018 05:09) (82 - 114)  BP: 113/62 (07 Mar 2018 05:09) (113/62 - 136/70)  BP(mean): --  RR: 19 (07 Mar 2018 05:09) (19 - 21)  SpO2: 100% (07 Mar 2018 05:09) (96% - 100%)    ABG - ( 06 Mar 2018 05:30 )  pH: 7.36  /  pCO2: 82    /  pO2: 98    / HCO3: 45    / Base Excess: 17.2  /  SaO2: 98                              9.7    14.4  )-----------( 215      ( 06 Mar 2018 07:12 )             31.0       03-06    142  |  93<L>  |  93<H>  ----------------------------<  259<H>  4.9   |  43<H>  |  1.45<H>    Ca    9.3      06 Mar 2018 07:12      CULTURES:  Culture Results:   No growth (02-24 @ 09:10)  Culture Results:   No growth to date. (02-23 @ 13:18)  Culture Results:   No growth to date. (02-23 @ 13:18)    Most recent blood culture -- 02-24 @ 09:10   -- -- .Urine Clean Catch (Midstream) 02-24 @ 09:10  Most recent blood culture -- 02-23 @ 13:18   -- -- .Blood Blood-Peripheral 02-23 @ 13:18      Physical Examination:   PULM: Decreased excursion with R diminished BS  CVS: S1 S2  ABD: Soft, non-tender  EXT:  No clubbing, cyanosis, or edema    RADIOLOGY REVIEWED  CXR: R>L effusion, pulm vasc mónica, R 1/2 opacity    CT chest:    TTE:

## 2018-03-07 NOTE — PROGRESS NOTE ADULT - SUBJECTIVE AND OBJECTIVE BOX
CC: AF with RVR.     Interval History: No significant cardiac events overnight.      MEDICATIONS:  ALBUTerol/ipratropium for Nebulization 3 milliLiter(s) Nebulizer every 6 hours  bisacodyl Suppository 10 milliGRAM(s) Rectal daily PRN  diltiazem    Tablet 30 milliGRAM(s) Oral every 6 hours  furosemide   Injectable 40 milliGRAM(s) IV Push every 12 hours  haloperidol    Injectable 0.5 milliGRAM(s) IV Push every 2 hours PRN  lidocaine   Patch 1 Patch Transdermal daily  methylPREDNISolone sodium succinate Injectable 20 milliGRAM(s) IV Push every 8 hours  morphine  - Injectable 0.5 milliGRAM(s) IV Push every 4 hours  morphine  - Injectable 1 milliGRAM(s) IV Push every 1 hour PRN  pantoprazole  Injectable 40 milliGRAM(s) IV Push daily      LABS:      142  |  93<L>  |  93<H>  ----------------------------<  259<H>  4.9   |  43<H>  |  1.45<H>    Ca    9.3      06 Mar 2018 07:12                            9.7    14.4  )-----------( 215      ( 06 Mar 2018 07:12 )             31.0       VITAL SIGNS:   T(C): 36.5 (18 @ 05:09), Max: 36.8 (18 @ 11:23)  HR: 102 (18 @ 05:09) (82 - 114)  BP: 113/62 (18 @ 05:09) (113/62 - 136/70)  RR: 19 (18 @ 05:09) (19 - 21)  SpO2: 100% (18 @ 05:09) (96% - 100%)  Daily     Daily Weight in k.3 (07 Mar 2018 05:09)  I&O's Summary    06 Mar 2018 07:01  -  07 Mar 2018 07:00  --------------------------------------------------------  IN: 1046 mL / OUT: 1840 mL / NET: -794 mL        TELE: AF

## 2018-03-07 NOTE — PROGRESS NOTE ADULT - SUBJECTIVE AND OBJECTIVE BOX
INTERVAL HPI/OVERNIGHT EVENTS:  Pt more lethargic now with increasing RR.      Allergies    Avelox (Urticaria; Rash)  Ventolin (Angioedema)    Intolerances        ADVANCE DIRECTIVES:    DNR: [ ] NO [ x] YES (Date) MOLST [ ] NO [ ] YES (Date)    MEDICATIONS  (STANDING):  ALBUTerol/ipratropium for Nebulization 3 milliLiter(s) Nebulizer every 6 hours  buDESOnide   0.25 milliGRAM(s) Respule 0.25 milliGRAM(s) Inhalation two times a day  cholecalciferol 1000 Unit(s) Oral two times a day  diltiazem    Tablet 30 milliGRAM(s) Oral every 6 hours  docusate sodium 100 milliGRAM(s) Oral two times a day  furosemide   Injectable 40 milliGRAM(s) IV Push every 12 hours  guaiFENesin  milliGRAM(s) Oral every 12 hours  heparin  Injectable 5000 Unit(s) SubCutaneous every 12 hours  lidocaine   Patch 1 Patch Transdermal daily  methylPREDNISolone sodium succinate Injectable 20 milliGRAM(s) IV Push every 8 hours  morphine  - Injectable 0.5 milliGRAM(s) IV Push every 6 hours  polyethylene glycol 3350 17 Gram(s) Oral daily  senna 2 Tablet(s) Oral at bedtime  tiotropium 18 MICROgram(s) Capsule 1 Capsule(s) Inhalation daily    MEDICATIONS  (PRN):  acetaminophen   Tablet. 650 milliGRAM(s) Oral every 6 hours PRN Moderate Pain (4 - 6)  guaiFENesin    Syrup 200 milliGRAM(s) Oral every 6 hours PRN Cough  morphine  - Injectable 0.5 milliGRAM(s) IV Push every 4 hours PRN tachypnea / respiratory distress  sodium chloride 0.65% Nasal 1 Spray(s) Both Nostrils four times a day PRN Nasal Congestion      PRESENT SYMPTOMS:  Source: [ ] Patient   [ ] Family   [x ] Team     Pain:                        [x ] No [ ] Yes             [ ] Mild [ ] Moderate [ ] Severe    Onset -  Location -  Duration -  Character -  Alleviating/Aggravating -  Radiation -  Timing -    Dyspnea:                [ ] No [ x] Yes             [ ] Mild [ ] Moderate [x ] Severe    Anxiety:                  [x ] No [ ] Yes             [ ] Mild [ ] Moderate [ ] Severe    Fatigue:                  [ ] No [x ] Yes             [ ] Mild [ ] Moderate [ x] Severe    Nausea:                  [x ] No [ ] Yes             [ ] Mild [ ] Moderate [ ] Severe    Loss of appetite:   [ ] No [x ] Yes             [ ] Mild [ ] Moderate [ ] Severe    Constipation:        [x ] No [ ] Yes             [ ] Mild [ ] Moderate [ ] Severe    Other Symptoms:  [ ] All other review of systems negative   [x ] Unable to obtain due to poor mentation     Karnofsky Performance Score/Palliative Performance Status Version 2:    20     %    PHYSICAL EXAM:  Vital Signs Last 24 Hrs  T(C): 36.5 (07 Mar 2018 05:09), Max: 36.8 (06 Mar 2018 11:23)  T(F): 97.7 (07 Mar 2018 05:09), Max: 98.3 (06 Mar 2018 11:23)  HR: 102 (07 Mar 2018 05:09) (82 - 114)  BP: 113/62 (07 Mar 2018 05:09) (113/62 - 136/70)  BP(mean): --  RR: 19 (07 Mar 2018 05:09) (19 - 21)  SpO2: 100% (07 Mar 2018 05:09) (96% - 100%) I&O's Summary    06 Mar 2018 07:01  -  07 Mar 2018 07:00  --------------------------------------------------------  IN: 1046 mL / OUT: 1840 mL / NET: -794 mL        General:  [ ] Alert  [ ] Oriented x      [x ] Lethargic  [ ] Agitated   [ ] Cachexia   [ ] Unarousable  [ ] Verbal  [ x] Non-Verbal    HEENT:  [ ] Normal   [x ] Dry mouth   [ ] ET Tube    [ ] Trach  [ ] Oral lesions    Lungs:   [ x] Clear [ x] Tachypnea  [ ] Audible excessive secretions   [ ] Rhonchi        [ ] Right [ ] Left [ ] Bilateral  [ ] Crackles        [ ] Right [ ] Left [ ] Bilateral  [ ] Wheezing     [ ] Right [ ] Left [ ] Bilateral    Cardiovascular:  [ ] Regular [ ] Irregular [x ] Tachycardia   [ ] Bradycardia  [ ] Murmur [ ] Other    Abdomen: [x ] Soft  [x] Distended   [x ] +BS  [ ] Non tender [ ] Tender  [ ]PEG   [ ]OGT/ NGT   Last BM:       Genitourinary: [ ] Normal [x ] Incontinent   [ ] Oliguria/Anuria   [ ] Mcqueen    Musculoskeletal:  [ ] Normal   [ ] Weakness  [x ] Bedbound/Wheelchair bound [ ] Edema    Neurological: [ ] No focal deficits  [ ] Cognitive impairment  [ ] Dysphagia [ ] Dysarthria [ ] Paresis [x ] Other +encephalopathy    Skin: [ x] Normal   [ ] Pressure ulcer(s)                  [ ] Rash    LABS:  [ ] Critical Care time for unstable patient with organ failure.  Total Time:                 minutes  03-06    142  |  93<L>  |  93<H>  ----------------------------<  259<H>  4.9   |  43<H>  |  1.45<H>    Ca    9.3      06 Mar 2018 07:12            Shock: [ ] Septic [ ] Cardiogenic [ ] Neurologic [ ] Hypovolemic  Vasopressors x   Inotrophs x     Oral Intake: [ ] Unable/mouth care only [ ] Minimal [ ] Moderate [ ] Full Capability  Diet: [ ] NPO [ ] Tube feeds [ ] TPN [ ] Other     RADIOLOGY & ADDITIONAL STUDIES:    REFERRALS:   [ ] Chaplaincy  [ ] Hospice  [ ] Child Life  [ ] Social Work  [ ] Case management [ ] Holistic Therapy       RADIOLOGY & ADDITIONAL STUDIES:

## 2018-03-07 NOTE — PROGRESS NOTE ADULT - SUBJECTIVE AND OBJECTIVE BOX
Patient is a 89y old  Female who presents with a chief complaint of COUGH,SOB (24 Feb 2018 03:47)      HPI:    MEDICATIONS  (STANDING):  ALBUTerol/ipratropium for Nebulization 3 milliLiter(s) Nebulizer every 6 hours  diltiazem    Tablet 30 milliGRAM(s) Oral every 6 hours  furosemide   Injectable 40 milliGRAM(s) IV Push every 12 hours  lidocaine   Patch 1 Patch Transdermal daily  methylPREDNISolone sodium succinate Injectable 20 milliGRAM(s) IV Push every 8 hours  morphine  - Injectable 0.5 milliGRAM(s) IV Push every 4 hours  pantoprazole  Injectable 40 milliGRAM(s) IV Push daily    MEDICATIONS  (PRN):  bisacodyl Suppository 10 milliGRAM(s) Rectal daily PRN Constipation  haloperidol    Injectable 0.5 milliGRAM(s) IV Push every 2 hours PRN agitation  morphine  - Injectable 1 milliGRAM(s) IV Push every 1 hour PRN dyspnea  morphine  - Injectable 1 milliGRAM(s) IV Push every 1 hour PRN pain      Allergies    Avelox (Urticaria; Rash)  Ventolin (Angioedema)    Intolerances        REVIEW OF SYSTEM:  CONSTITUTIONAL: No fever, No change in weight, No fatigue  HEAD: No headache, No dizziness, No recent trauma  EYES: No eye pain, No visual disturbances, No discharge  ENT:  No difficulty hearing, No tinnitus, No vertigo, No sinus pain, No throat pain  NECK: No pain, No stiffness  BREASTS: No pain, No masses, No nipple discharge  RESPIRATORY: No cough, No wheezing, No chills, No hemoptysis, No shortness of breath at rest or exertional shortness of breath  CARDIOVASCULAR: No chest pain, No palpitations, No dizziness, No CHF, No arrhythmia, No cardiomegaly, No leg swelling  GASTROINTESTINAL: No abdominal, No epigastric pain. No nausea, No vomiting, No hematemesis, No diarrhea, No constipation. No melena, No hematochezia. No GERD  GENITOURINARY: No dysuria, No frequency, No hematuria, No incontinence, No nocturia, No hesitancy,  SKIN: No itching, No burning, No rashes, No lesions   LYMPH NODES: No history of enlarged glands  ENDOCRINE: No heat or cold intolerance, No hair loss. No osteoporosis, No thyroid disease  MUSCULOSKELETAL: No joint pain or swelling, No muscle, back, or extremity pain  PSYCHIATRIC: No depression, No anxiety, No mood swings, No difficulty sleeping  HEME/LYMPH: No easy bruising, No anticoagulants, No bleeding disorder, No bleeding gums  ALLERGY AND IMMUNOLOGIC: No hives, No eczema  NEUROLOGICAL: No memory loss, No loss of strength, No numbness, No tremors        VITALS:   T(C): 36.8 (03-07-18 @ 13:54), Max: 36.8 (03-07-18 @ 13:54)  HR: 128 (03-07-18 @ 13:54) (101 - 128)  BP: 126/68 (03-07-18 @ 13:54) (107/50 - 126/68)  RR: 20 (03-07-18 @ 13:54) (18 - 20)  SpO2: 92% (03-07-18 @ 13:54) (92% - 100%)  Wt(kg): --    03-06 @ 07:01  -  03-07 @ 07:00  --------------------------------------------------------  IN: 1046 mL / OUT: 1840 mL / NET: -794 mL    03-07 @ 07:01  -  03-07 @ 23:19  --------------------------------------------------------  IN: 120 mL / OUT: 150 mL / NET: -30 mL        PHYSICAL EXAM:  GENERAL: NAD, well nourished and conversant  HEAD:  Atraumatic  EYES: EOM, PERRLA, conjunctiva pink and sclera white  ENT: No tonsillar erythema, exudates, or enlargement, moist mucous membranes, good dentition, no lesions  NECK: Supple, No JVD, normal thyroid, carotids with normal upstrokes and no bruits  CHEST/LUNG: Clear to auscultation bilaterally, No rales, rhonchi, wheezing, or rubs  HEART: Regular rate and rhythm, No murmurs, rubs, or gallops  ABDOMEN: Soft, nondistended, no masses, guarding, tenderness or rebound, bowel sounds present  EXTREMITIES:  2+ Peripheral Pulses, No clubbing, cyanosis, or edema. No arthritis of shoulders, elbows, hands, hips, knees, ankles, or feet. No DJD C spine, T spine, or L/S spine  LYMPH: No lymphadenopathy noted  SKIN: No rashes or lesions  NERVOUS SYSTEM:  Alert & Oriented X3, normal cognitive function. Motor Strength 5/5 right upper and right lower.  5/5 left upper and left lower extremities, DTRs 2+ intact and symmetric    LABS:                          9.7    14.4  )-----------( 215      ( 06 Mar 2018 07:12 )             31.0     03-06    142  |  93<L>  |  93<H>  ----------------------------<  259<H>  4.9   |  43<H>  |  1.45<H>  03-05    140  |  91<L>  |  80<H>  ----------------------------<  253<H>  4.9   |  39<H>  |  1.37<H>    Ca    9.3      06 Mar 2018 07:12  Ca    9.4      05 Mar 2018 07:10  Mg     2.5     03-05      CAPILLARY BLOOD GLUCOSE          RADIOLOGY & ADDITIONAL TESTS:      Consultant(s):    Care Discussed with Consultants/Other Providers [ ] YES  [ ] NO Patient is a 89y old  Female who presents with a chief complaint of COUGH,SOB (24 Feb 2018 03:47)      HPI:  Patient comfortable , Actively dying.  Minimally interactive    MEDICATIONS  (STANDING):  ALBUTerol/ipratropium for Nebulization 3 milliLiter(s) Nebulizer every 6 hours  diltiazem    Tablet 30 milliGRAM(s) Oral every 6 hours  furosemide   Injectable 40 milliGRAM(s) IV Push every 12 hours  lidocaine   Patch 1 Patch Transdermal daily  methylPREDNISolone sodium succinate Injectable 20 milliGRAM(s) IV Push every 8 hours  morphine  - Injectable 0.5 milliGRAM(s) IV Push every 4 hours  pantoprazole  Injectable 40 milliGRAM(s) IV Push daily    MEDICATIONS  (PRN):  bisacodyl Suppository 10 milliGRAM(s) Rectal daily PRN Constipation  haloperidol    Injectable 0.5 milliGRAM(s) IV Push every 2 hours PRN agitation  morphine  - Injectable 1 milliGRAM(s) IV Push every 1 hour PRN dyspnea  morphine  - Injectable 1 milliGRAM(s) IV Push every 1 hour PRN pain      Allergies    Avelox (Urticaria; Rash)  Ventolin (Angioedema)    Intolerances        VITALS:   T(C): 36.8 (03-07-18 @ 13:54), Max: 36.8 (03-07-18 @ 13:54)  HR: 128 (03-07-18 @ 13:54) (101 - 128)  BP: 126/68 (03-07-18 @ 13:54) (107/50 - 126/68)  RR: 20 (03-07-18 @ 13:54) (18 - 20)  SpO2: 92% (03-07-18 @ 13:54) (92% - 100%)  Wt(kg): --    03-06 @ 07:01  -  03-07 @ 07:00  --------------------------------------------------------  IN: 1046 mL / OUT: 1840 mL / NET: -794 mL    03-07 @ 07:01  -  03-07 @ 23:19  --------------------------------------------------------  IN: 120 mL / OUT: 150 mL / NET: -30 mL        PHYSICAL EXAM:  GENERAL: cachectic comfortable   HEAD:  Atraumatic  EYES: EOM, PERRLA, conjunctiva pink and sclera white  ENT: No tonsillar erythema, exudates, or enlargement, moist mucous membranes, good dentition, no lesions  NECK: Supple, No JVD, normal thyroid, carotids with normal upstrokes and no bruits  CHEST/LUNG: Clear to auscultation bilaterally, Decreased BS with scattered rhonchi  HEART: Regular rate and rhythm, No murmurs, rubs, or gallops  ABDOMEN: Soft, nondistended, no masses, guarding, tenderness or rebound, bowel sounds present  EXTREMITIES:  2+ Peripheral Pulses, No clubbing, cyanosis, or edema. No arthritis of shoulders, elbows, hands, hips, knees, ankles, or feet. No DJD C spine, T spine, or L/S spine  LYMPH: No lymphadenopathy noted  SKIN: No rashes or lesions  NERVOUS SYSTEM:  Alert & Oriented X3, normal cognitive function. Motor Strength 5/5 right upper and right lower.  5/5 left upper and left lower extremities, DTRs 2+ intact and symmetric    LABS:                          9.7    14.4  )-----------( 215      ( 06 Mar 2018 07:12 )             31.0     03-06    142  |  93<L>  |  93<H>  ----------------------------<  259<H>  4.9   |  43<H>  |  1.45<H>  03-05    140  |  91<L>  |  80<H>  ----------------------------<  253<H>  4.9   |  39<H>  |  1.37<H>    Ca    9.3      06 Mar 2018 07:12  Ca    9.4      05 Mar 2018 07:10  Mg     2.5     03-05      CAPILLARY BLOOD GLUCOSE          RADIOLOGY & ADDITIONAL TESTS:      Consultant(s):    Care Discussed with Consultants/Other Providers [ ] YES  [ ] NO

## 2018-03-08 NOTE — PROGRESS NOTE ADULT - ATTENDING COMMENTS
Patient with worsening pulmonary function. Patient is DNR. will move towards comfort care   discussed with palliative care. Suggest home hospice care in accordance with the patient's wishes.

## 2018-03-08 NOTE — PROGRESS NOTE ADULT - PROBLEM SELECTOR PLAN 1
off bipap  on 02 as tolerated  low dose morphine atc- will increase to Q 4 hours atc and prn. pt required 1 dose over 24 hrs + flu earlier this admission, now likely worsening baseline COPD  off bipap  on 02 as tolerated  low dose morphine atc- will increase to Q 4 hours atc and prn. pt required 1 dose over 24 hrs. Will consider oral tomorrow if symptoms are managed.

## 2018-03-08 NOTE — DIETITIAN INITIAL EVALUATION ADULT. - OTHER INFO
Unknown weight history, in house weight has fluctuated between 97.6 lbs (3/1) and 123.8 lbs (3/4), noted admission weight 101.2 lbs (2/23), current admit weight 113 lbs (3/7). Pt with no food allergies documented, pt noted to be taking vitamin D at home. Per RN pt with no N+V, last BM documented 3/6. Pt reports pt has been more lethargic and has been eating very slowly, pt will take meds in applesauce and aide fed pt yogurt today. Speech pathologist consulted for swallow evaluation-dysphagia work up not deemed appropriate at this time, GOC trending towards comfort, pt ordered for mechanical soft diet as of 3/2. Per RN pt eating minimally in house.

## 2018-03-08 NOTE — DIETITIAN INITIAL EVALUATION ADULT. - NS AS NUTRI INTERV MEALS SNACK
1. Diet texture deferred to medical team, currently mechanical soft, 2. Will provide health shakes 2 x daily to further optimize po intake due to smaller volume (200 kcal, 6g protein per 4 oz serving), 3. Obtain subjective information and food preferences as able when family is present, 4. Monitor PO intake, diet tolerance, weight trends, labs, and skin integrity, 5. RD to remain available as needed

## 2018-03-08 NOTE — PROGRESS NOTE ADULT - SUBJECTIVE AND OBJECTIVE BOX
INTERVAL HPI/OVERNIGHT EVENTS:  Pt more lethargic now with increasing RR.      Allergies    Avelox (Urticaria; Rash)  Ventolin (Angioedema)    Intolerances    ADVANCE DIRECTIVES:    DNR: [ ] NO [ x] YES (Date) MOLST [ ] NO [ ] YES (Date)    MEDICATIONS  (STANDING):  ALBUTerol/ipratropium for Nebulization 3 milliLiter(s) Nebulizer every 6 hours  diltiazem    Tablet 30 milliGRAM(s) Oral every 6 hours  furosemide   Injectable 40 milliGRAM(s) IV Push every 12 hours  lidocaine   Patch 1 Patch Transdermal daily  methylPREDNISolone sodium succinate Injectable 20 milliGRAM(s) IV Push every 8 hours  morphine  - Injectable 0.5 milliGRAM(s) IV Push every 4 hours  pantoprazole  Injectable 40 milliGRAM(s) IV Push daily    MEDICATIONS  (PRN):  bisacodyl Suppository 10 milliGRAM(s) Rectal daily PRN Constipation  haloperidol    Injectable 0.5 milliGRAM(s) IV Push every 2 hours PRN agitation  morphine  - Injectable 1 milliGRAM(s) IV Push every 1 hour PRN dyspnea  morphine  - Injectable 1 milliGRAM(s) IV Push every 1 hour PRN pain        PRESENT SYMPTOMS:  Source: [ ] Patient   [ ] Family   [x ] Team     Pain:                        [x ] No [ ] Yes             [ ] Mild [ ] Moderate [ ] Severe    Onset -  Location -  Duration -  Character -  Alleviating/Aggravating -  Radiation -  Timing -    Dyspnea:                [ ] No [ x] Yes             [ ] Mild [ ] Moderate [x ] Severe    Anxiety:                  [x ] No [ ] Yes             [ ] Mild [ ] Moderate [ ] Severe    Fatigue:                  [ ] No [x ] Yes             [ ] Mild [ ] Moderate [ x] Severe    Nausea:                  [x ] No [ ] Yes             [ ] Mild [ ] Moderate [ ] Severe    Loss of appetite:   [ ] No [x ] Yes             [ ] Mild [ ] Moderate [ ] Severe    Constipation:        [x ] No [ ] Yes             [ ] Mild [ ] Moderate [ ] Severe    Other Symptoms:  [ ] All other review of systems negative   [x ] Unable to obtain due to poor mentation     Karnofsky Performance Score/Palliative Performance Status Version 2:    20     %    PHYSICAL EXAM:  Vital Signs Last 24 Hrs  T(C): 36.4 (08 Mar 2018 07:49), Max: 36.4 (08 Mar 2018 07:49)  T(F): 97.5 (08 Mar 2018 07:49), Max: 97.5 (08 Mar 2018 07:49)  HR: 103 (08 Mar 2018 07:49) (103 - 103)  BP: 105/62 (08 Mar 2018 07:49) (105/62 - 105/62)  BP(mean): --  RR: 18 (08 Mar 2018 07:49) (18 - 18)  SpO2: 96% (08 Mar 2018 07:49) (96% - 96%)      General:  [ ] Alert  [ ] Oriented x      [x ] Lethargic  [ ] Agitated   [ ] Cachexia   [ ] Unarousable  [ ] Verbal  [ x] Non-Verbal    HEENT:  [ ] Normal   [x ] Dry mouth   [ ] ET Tube    [ ] Trach  [ ] Oral lesions    Lungs:   [ x] Clear [ x] Tachypnea  [ ] Audible excessive secretions   [ ] Rhonchi        [ ] Right [ ] Left [ ] Bilateral  [ ] Crackles        [ ] Right [ ] Left [ ] Bilateral  [ ] Wheezing     [ ] Right [ ] Left [ ] Bilateral    Cardiovascular:  [ ] Regular [ ] Irregular [x ] Tachycardia   [ ] Bradycardia  [ ] Murmur [ ] Other    Abdomen: [x ] Soft  [x] Distended   [x ] +BS  [ ] Non tender [ ] Tender  [ ]PEG   [ ]OGT/ NGT   Last BM:       Genitourinary: [ ] Normal [x ] Incontinent   [ ] Oliguria/Anuria   [ ] Mcqueen    Musculoskeletal:  [ ] Normal   [ ] Weakness  [x ] Bedbound/Wheelchair bound [ ] Edema    Neurological: [ ] No focal deficits  [x ] Cognitive impairment  [ ] Dysphagia [ ] Dysarthria [ ] Paresis [x ] Other +encephalopathy    Skin: [ x] Normal   [ ] Pressure ulcer(s)                  [ ] Rash    LABS:  [ ] Critical Care time for unstable patient with organ failure.  Total Time:                 minutes  03-06    142  |  93<L>  |  93<H>  ----------------------------<  259<H>  4.9   |  43<H>  |  1.45<H>    Ca    9.3      06 Mar 2018 07:12            Shock: [ ] Septic [ ] Cardiogenic [ ] Neurologic [ ] Hypovolemic  Vasopressors x   Inotrophs x     Oral Intake: [ ] Unable/mouth care only [x ] Minimal [ ] Moderate [ ] Full Capability  Diet: [ ] NPO [ ] Tube feeds [ ] TPN [ ] Other     RADIOLOGY & ADDITIONAL STUDIES:    REFERRALS:   [ ] Chaplaincy  [ ] Hospice  [ ] Child Life  [ ] Social Work  [ ] Case management [ ] Holistic Therapy       RADIOLOGY & ADDITIONAL STUDIES: INTERVAL HPI/OVERNIGHT EVENTS:  Pt more lethargic now with increasing RR.      Allergies    Avelox (Urticaria; Rash)  Ventolin (Angioedema)    Intolerances    ADVANCE DIRECTIVES:    DNR: [ ] NO [ x] YES (Date) MOLST [ ] NO [ ] YES (Date)    MEDICATIONS  (STANDING):  ALBUTerol/ipratropium for Nebulization 3 milliLiter(s) Nebulizer every 6 hours  diltiazem    Tablet 30 milliGRAM(s) Oral every 6 hours  furosemide   Injectable 40 milliGRAM(s) IV Push every 12 hours  lidocaine   Patch 1 Patch Transdermal daily  methylPREDNISolone sodium succinate Injectable 20 milliGRAM(s) IV Push every 8 hours  morphine  - Injectable 0.5 milliGRAM(s) IV Push every 4 hours  pantoprazole  Injectable 40 milliGRAM(s) IV Push daily    MEDICATIONS  (PRN):  bisacodyl Suppository 10 milliGRAM(s) Rectal daily PRN Constipation  haloperidol    Injectable 0.5 milliGRAM(s) IV Push every 2 hours PRN agitation  morphine  - Injectable 1 milliGRAM(s) IV Push every 1 hour PRN dyspnea  morphine  - Injectable 1 milliGRAM(s) IV Push every 1 hour PRN pain    PRESENT SYMPTOMS:  Source: [ ] Patient   [ ] Family   [x ] Team     Pain:                        [x ] No [ ] Yes             [ ] Mild [ ] Moderate [ ] Severe    Onset -  Location -  Duration -  Character -  Alleviating/Aggravating -  Radiation -  Timing -    Dyspnea:                [ ] No [ x] Yes             [ ] Mild [ ] Moderate [x ] Severe    Anxiety:                  [x ] No [ ] Yes             [ ] Mild [ ] Moderate [ ] Severe    Fatigue:                  [ ] No [x ] Yes             [ ] Mild [ ] Moderate [ x] Severe    Nausea:                  [ ] No [ ] Yes             [ ] Mild [ ] Moderate [ ] Severe    Loss of appetite:   [ ] No [ ] Yes             [ ] Mild [ ] Moderate [ ] Severe    Constipation:        [x ] No [ ] Yes             [ ] Mild [ ] Moderate [ ] Severe    Other Symptoms:  [ ] All other review of systems negative   [x ] Unable to obtain due to poor mentation     Karnofsky Performance Score/Palliative Performance Status Version 2:    20     %    PHYSICAL EXAM:  Vital Signs Last 24 Hrs  T(C): 36.4 (08 Mar 2018 07:49), Max: 36.4 (08 Mar 2018 07:49)  T(F): 97.5 (08 Mar 2018 07:49), Max: 97.5 (08 Mar 2018 07:49)  HR: 103 (08 Mar 2018 07:49) (103 - 103)  BP: 105/62 (08 Mar 2018 07:49) (105/62 - 105/62)  BP(mean): --  RR: 18 (08 Mar 2018 07:49) (18 - 18)  SpO2: 96% (08 Mar 2018 07:49) (96% - 96%)    General:  [ ] Alert  [ ] Oriented x      [x ] Lethargic  [ ] Agitated   [ ] Cachexia   [ ] Unarousable  [ ] Verbal  [ x] Non-Verbal    HEENT:  [ ] Normal   [x ] Dry mouth   [ ] ET Tube    [ ] Trach  [ ] Oral lesions    Lungs:   [ x] Clear [ x] Tachypnea  [ ] Audible excessive secretions   [ ] Rhonchi        [ ] Right [ ] Left [ ] Bilateral  [ ] Crackles        [ ] Right [ ] Left [ ] Bilateral  [ ] Wheezing     [ ] Right [ ] Left [ ] Bilateral    Cardiovascular:  [ ] Regular [ ] Irregular [x ] Tachycardia   [ ] Bradycardia  [ ] Murmur [ ] Other    Abdomen: [x ] Soft  [x] Distended   [x ] +BS  [ ] Non tender [ ] Tender  [ ]PEG   [ ]OGT/ NGT   Last BM:       Genitourinary: [ ] Normal [x ] Incontinent   [ ] Oliguria/Anuria   [ ] Mcqueen    Musculoskeletal:  [ ] Normal   [ ] Weakness  [x ] Bedbound/Wheelchair bound [ ] Edema    Neurological: [ ] No focal deficits  [x ] Cognitive impairment  [ ] Dysphagia [ ] Dysarthria [ ] Paresis [x ] Other +encephalopathy    Skin: [ x] Normal   [ ] Pressure ulcer(s)                  [ ] Rash    LABS: no new labs for review    Oral Intake: [ ] Unable/mouth care only [x ] Minimal [ ] Moderate [ ] Full Capability  Diet: [ ] NPO [ ] Tube feeds [ ] TPN [ ] Other     RADIOLOGY & ADDITIONAL STUDIES: no new imaging for review    REFERRALS:   [ ] Chaplaincy  [ ] Hospice  [ ] Child Life  [ ] Social Work  [ ] Case management [ ] Holistic Therapy

## 2018-03-08 NOTE — DIETITIAN INITIAL EVALUATION ADULT. - ENERGY NEEDS
Pt seen for nutrition assessment. Pt with PMH including COPD, CHF admitted with cough and shortness of breath found to be positive for influenza, pt developed pneumonia S/P treatment with antibiotics, pt now transferred to PCU, overall poor prognosis, GOC trending towards comfort with overall goal for hospice services.     Ht:5'0" , Wt: 101.2 lbs, BMI: 19.8 kg/m2, IBW: 100 lbs +/- 10%, %IBW: 101%  No edema, Skin noted with stage 1 sacrum ulcer and suspected DTI to sacral spine

## 2018-03-08 NOTE — PROGRESS NOTE ADULT - ATTENDING COMMENTS
I was physically present for the key portions of the evaluation and management (E/M) service provided.  I agree with the above history, physical, and plan which I have reviewed and edited where appropriate. Plan discussed with team.

## 2018-03-08 NOTE — PROGRESS NOTE ADULT - PROBLEM SELECTOR PLAN 4
Pt is dnr/dni. brought to PCU, aide at bedside.  questions answered emotional support provided. Pt is DNR/DNI. Brought to PCU, aide at bedside.  questions answered emotional support provided. Lives with , who was recently hospitalized, and has Alzheimer's (per aide) and appears to be her surrogate. Goal would be home with hospice likely, but will need to discuss further with  and any other involved family members.

## 2018-03-09 NOTE — PROGRESS NOTE ADULT - SUBJECTIVE AND OBJECTIVE BOX
Patient is a 89y old  Female who presents with a chief complaint of COUGH,SOB (24 Feb 2018 03:47)      HPI:    MEDICATIONS  (STANDING):  ALBUTerol/ipratropium for Nebulization 3 milliLiter(s) Nebulizer every 6 hours  diltiazem    Tablet 30 milliGRAM(s) Oral every 6 hours  furosemide   Injectable 40 milliGRAM(s) IV Push every 12 hours  methylPREDNISolone sodium succinate Injectable 20 milliGRAM(s) IV Push every 8 hours  morphine  - Injectable 0.5 milliGRAM(s) IV Push every 4 hours  pantoprazole  Injectable 40 milliGRAM(s) IV Push daily    MEDICATIONS  (PRN):  bisacodyl Suppository 10 milliGRAM(s) Rectal daily PRN Constipation  haloperidol    Injectable 1 milliGRAM(s) IV Push every 2 hours PRN agitation  morphine  - Injectable 1 milliGRAM(s) IV Push every 1 hour PRN dyspnea  morphine  - Injectable 1 milliGRAM(s) IV Push every 1 hour PRN pain      Allergies    Avelox (Urticaria; Rash)  Ventolin (Angioedema)    Intolerances        REVIEW OF SYSTEM:  CONSTITUTIONAL: No fever, No change in weight, No fatigue  HEAD: No headache, No dizziness, No recent trauma  EYES: No eye pain, No visual disturbances, No discharge  ENT:  No difficulty hearing, No tinnitus, No vertigo, No sinus pain, No throat pain  NECK: No pain, No stiffness  BREASTS: No pain, No masses, No nipple discharge  RESPIRATORY: No cough, No wheezing, No chills, No hemoptysis, No shortness of breath at rest or exertional shortness of breath  CARDIOVASCULAR: No chest pain, No palpitations, No dizziness, No CHF, No arrhythmia, No cardiomegaly, No leg swelling  GASTROINTESTINAL: No abdominal, No epigastric pain. No nausea, No vomiting, No hematemesis, No diarrhea, No constipation. No melena, No hematochezia. No GERD  GENITOURINARY: No dysuria, No frequency, No hematuria, No incontinence, No nocturia, No hesitancy,  SKIN: No itching, No burning, No rashes, No lesions   LYMPH NODES: No history of enlarged glands  ENDOCRINE: No heat or cold intolerance, No hair loss. No osteoporosis, No thyroid disease  MUSCULOSKELETAL: No joint pain or swelling, No muscle, back, or extremity pain  PSYCHIATRIC: No depression, No anxiety, No mood swings, No difficulty sleeping  HEME/LYMPH: No easy bruising, No anticoagulants, No bleeding disorder, No bleeding gums  ALLERGY AND IMMUNOLOGIC: No hives, No eczema  NEUROLOGICAL: No memory loss, No loss of strength, No numbness, No tremors        VITALS:   T(C): 36.7 (03-09-18 @ 05:27), Max: 36.7 (03-09-18 @ 05:27)  HR: 122 (03-09-18 @ 05:27) (120 - 122)  BP: 159/79 (03-09-18 @ 05:27) (127/74 - 159/79)  RR: 18 (03-09-18 @ 05:27) (18 - 18)  SpO2: 95% (03-09-18 @ 05:27) (95% - 95%)  Wt(kg): --    03-08 @ 07:01  -  03-09 @ 07:00  --------------------------------------------------------  IN: 0 mL / OUT: 600 mL / NET: -600 mL    03-09 @ 07:01  -  03-09 @ 21:46  --------------------------------------------------------  IN: 0 mL / OUT: 100 mL / NET: -100 mL        PHYSICAL EXAM:  GENERAL: NAD, well nourished and conversant  HEAD:  Atraumatic  EYES: EOM, PERRLA, conjunctiva pink and sclera white  ENT: No tonsillar erythema, exudates, or enlargement, moist mucous membranes, good dentition, no lesions  NECK: Supple, No JVD, normal thyroid, carotids with normal upstrokes and no bruits  CHEST/LUNG: Clear to auscultation bilaterally, No rales, rhonchi, wheezing, or rubs  HEART: Regular rate and rhythm, No murmurs, rubs, or gallops  ABDOMEN: Soft, nondistended, no masses, guarding, tenderness or rebound, bowel sounds present  EXTREMITIES:  2+ Peripheral Pulses, No clubbing, cyanosis, or edema. No arthritis of shoulders, elbows, hands, hips, knees, ankles, or feet. No DJD C spine, T spine, or L/S spine  LYMPH: No lymphadenopathy noted  SKIN: No rashes or lesions  NERVOUS SYSTEM:  Alert & Oriented X3, normal cognitive function. Motor Strength 5/5 right upper and right lower.  5/5 left upper and left lower extremities, DTRs 2+ intact and symmetric    LABS:            CAPILLARY BLOOD GLUCOSE          RADIOLOGY & ADDITIONAL TESTS:      Consultant(s):    Care Discussed with Consultants/Other Providers [ ] YES  [ ] NO Patient is a 89y old  Female who presents with a chief complaint of COUGH,SOB (24 Feb 2018 03:47)      HPI:  Resting quietly NO chest pain no abdominal pain Respirations intermittently labored   She appears more restful  Lungs still very congested.  Patient seemed more detached    MEDICATIONS  (STANDING):  ALBUTerol/ipratropium for Nebulization 3 milliLiter(s) Nebulizer every 6 hours  diltiazem    Tablet 30 milliGRAM(s) Oral every 6 hours  furosemide   Injectable 40 milliGRAM(s) IV Push every 12 hours  methylPREDNISolone sodium succinate Injectable 20 milliGRAM(s) IV Push every 8 hours  morphine  - Injectable 0.5 milliGRAM(s) IV Push every 4 hours  pantoprazole  Injectable 40 milliGRAM(s) IV Push daily    MEDICATIONS  (PRN):  bisacodyl Suppository 10 milliGRAM(s) Rectal daily PRN Constipation  haloperidol    Injectable 1 milliGRAM(s) IV Push every 2 hours PRN agitation  morphine  - Injectable 1 milliGRAM(s) IV Push every 1 hour PRN dyspnea  morphine  - Injectable 1 milliGRAM(s) IV Push every 1 hour PRN pain      Allergies    Avelox (Urticaria; Rash)  Ventolin (Angioedema)    Intolerances      VITALS:   T(C): 36.7 (03-09-18 @ 05:27), Max: 36.7 (03-09-18 @ 05:27)  HR: 122 (03-09-18 @ 05:27) (120 - 122)  BP: 159/79 (03-09-18 @ 05:27) (127/74 - 159/79)  RR: 18 (03-09-18 @ 05:27) (18 - 18)  SpO2: 95% (03-09-18 @ 05:27) (95% - 95%)  Wt(kg): --    03-08 @ 07:01  -  03-09 @ 07:00  --------------------------------------------------------  IN: 0 mL / OUT: 600 mL / NET: -600 mL    03-09 @ 07:01  -  03-09 @ 21:46  --------------------------------------------------------  IN: 0 mL / OUT: 100 mL / NET: -100 mL        PHYSICAL EXAM:  GENERAL: NAD, cachectic   HEAD:  Atraumatic  EYES: EOM, PERRLA, conjunctiva pink and sclera white  ENT: No tonsillar erythema, exudates, or enlargement, moist mucous membranes, good dentition, no lesions  NECK: Supple, No JVD, normal thyroid, carotids with normal upstrokes and no bruits  CHEST/LUNG: Clear to auscultation bilaterally, No rales, rhonchi, wheezing, or rubs  Tachypnea with scattered rhonchis  HEART: Regular rate and rhythm, No murmurs, rubs, or gallops  ABDOMEN: Soft, nondistended, no masses, guarding, tenderness or rebound, bowel sounds present  EXTREMITIES:  2+ Peripheral Pulses, No clubbing, cyanosis, or edema. Frail and weak  sd  LYMPH: No lymphadenopathy noted  SKIN: No rashes or lesions  NERVOUS SYSTEM:  Alert & Oriented X3, normal cognitive function. Motor Strength 5/5 right upper and right lower.  5/5 left upper and left lower extremities, DTRs 2+ intact and symmetric    LABS:            CAPILLARY BLOOD GLUCOSE          RADIOLOGY & ADDITIONAL TESTS:      Consultant(s):    Care Discussed with Consultants/Other Providers [ ] YES  [ ] NO

## 2018-03-09 NOTE — PROGRESS NOTE ADULT - ATTENDING COMMENTS
Patient with worsening pulmonary function. Patient is DNR. will move towards comfort care   discussed with palliative care. Suggest home hospice care in accordance with the patient's wishes.  Continue present course.

## 2018-03-09 NOTE — PROGRESS NOTE ADULT - PROBLEM SELECTOR PLAN 1
+ flu earlier this admission, now likely worsening baseline COPD  off bipap  on 02 as tolerated  low dose morphine .5 mg IV Q4h stabdubg with 1 mg IV PRN q1h, s/p 2 PRNs

## 2018-03-09 NOTE — PROGRESS NOTE ADULT - PROBLEM SELECTOR PLAN 4
Pt is DNR/DNI. Brought to PCU, aide at bedside.  questions answered emotional support provided. Lives with , who was recently hospitalized, and has Alzheimer's (per aide) and appears to be her surrogate. Goal for home with hospice. Further discussions with  and family Pt is DNR/DNI. Brought to PCU, aide at bedside.  questions answered emotional support provided. Lives with , who was recently hospitalized, and has Alzheimer's (per aide) and appears to be her surrogate. Goal for home with hospice. Further discussions with  and family. Unclear if discharge home would be ideal, will need to monitor clinical status and have further discussions; will also need to discuss with heidi who lives in [place].

## 2018-03-09 NOTE — PROGRESS NOTE ADULT - SUBJECTIVE AND OBJECTIVE BOX
Geriatric and Palliative Care Unit Progress Note [ ] Hospice Progress Note [ ]     HPI:  89yo F PMH of hypothyroidism, COPD on home O2, CHF.  p/w CC cough/SOB. Pt. states she has chronic cough but for past couple of days. mucous feels stuck", reports she tried her albuterol inhaler this AM but did not help. Denies fever chills CP n/v/d came to the emergency room with respiratory distress. Tests positive for viral influenza.  CXR negative for pneumonia and CHF, despite BNP of > 5000.EKG with rapid atrial flutter. Given IV cardizem, IV hydration and BiPap with improvement. Started on tamiflu. Remains alert and awake and able to provide a full history independently (23 Feb 2018 19:35)    Overnight: No events overnight, some agitation that did not seem to respond to .5 mg IV haldol. Patient on standing morphine .5 mg IV q4h with PRN 1 mg q1h. She received PRN 1 mg x2 over the past 24 hours.    ADVANCE DIRECTIVES:    DNRYes      Allergies    Avelox (Urticaria; Rash)  Ventolin (Angioedema)    Intolerances        MEDICATIONS  (STANDING):  ALBUTerol/ipratropium for Nebulization 3 milliLiter(s) Nebulizer every 6 hours  diltiazem    Tablet 30 milliGRAM(s) Oral every 6 hours  furosemide   Injectable 40 milliGRAM(s) IV Push every 12 hours  methylPREDNISolone sodium succinate Injectable 20 milliGRAM(s) IV Push every 8 hours  morphine  - Injectable 0.5 milliGRAM(s) IV Push every 4 hours  pantoprazole  Injectable 40 milliGRAM(s) IV Push daily    MEDICATIONS  (PRN):  bisacodyl Suppository 10 milliGRAM(s) Rectal daily PRN Constipation  haloperidol    Injectable 0.5 milliGRAM(s) IV Push every 2 hours PRN agitation  morphine  - Injectable 1 milliGRAM(s) IV Push every 1 hour PRN dyspnea  morphine  - Injectable 1 milliGRAM(s) IV Push every 1 hour PRN pain      PRESENT SYMPTOMS:  Source: [ ] Patient   [ ] Family   [ ] Team     Pain:                        [x] No [ ] Yes             [ ] Mild [ ] Moderate [] Severe    Dyspnea:                [ ] No [x] Yes             [ ] Mild [ ] Moderate [x] Severe    Anxiety:                  [ ] No [x] Yes             [x] Mild [ ] Moderate [ ] Severe    Fatigue:                  [ ] No [ ] Yes             [ ] Mild [ ] Moderate [ ] Severe    Nausea:                  [x] No [ ] Yes             [ ] Mild [ ] Moderate [ ] Severe    Loss of appetite:   [x] No [ ] Yes             [ ] Mild [ ] Moderate [ ] Severe    Constipation:        [ ] No [ ] Yes             [ ] Mild [ ] Moderate [ ] Severe    Other Symptoms:  [ ] All other review of systems negative   [x] Unable to obtain due to poor mentation     Karnofsky Performance Score/Palliative Performance Status Version 2:         %    PHYSICAL EXAM:  Vital Signs Last 24 Hrs  T(C): 36.7 (09 Mar 2018 05:27), Max: 36.7 (09 Mar 2018 05:27)  T(F): 98 (09 Mar 2018 05:27), Max: 98 (09 Mar 2018 05:27)  HR: 122 (09 Mar 2018 05:27) (119 - 122)  BP: 159/79 (09 Mar 2018 05:27) (127/74 - 159/79)  BP(mean): --  RR: 18 (09 Mar 2018 05:27) (18 - 18)  SpO2: 95% (09 Mar 2018 05:27) (95% - 95%) I&O's Summary    08 Mar 2018 07:01  -  09 Mar 2018 07:00  --------------------------------------------------------  IN: 0 mL / OUT: 600 mL / NET: -600 mL        General:  [ ] Alert  [ ] Oriented x      [x] Lethargic  [ ] Agitated   [ ] Cachexia   [ ] Unarousable  [ ] Verbal  [ ] Non-Verbal    HEENT:  [ ] Normal   [x] Dry mouth   [ ] ET Tube    [ ] Trach  [ ] Oral lesions    Lungs:   [ ] Clear [x] Tachypnea  [ ] Audible excessive secretions   [ ] Rhonchi        [ ] Right [ ] Left [ ] Bilateral  [ ] Crackles        [ ] Right [ ] Left [ ] Bilateral  [ ] Wheezing     [ ] Right [ ] Left [ ] Bilateral  [ ] Respiratory failure [ ] Acute [ ] Chronic [ ] Hypoxemic [ ] Hypercarbic [ ] Other    Cardiovascular:   [ ] Regular [ ] Irregular [x] Tachycardia   [ ] Bradycardia  [ ] Murmur [ ] Other  [ ] Shock [ ] Septic [ ] Hypovolemic [ ] Neurogenic [ ] Cardiogenic   [ ] Vasopressors [ ] Inotrophs    Abdomen:   [x] Soft  [x] Distended   [ ] +BS  [ ] Non tender [ ] Tender  [ ]PEG   [ ]OGT/ NGT   Last BM:     [ ] Other    Genitourinary:  [ ] Normal [x] Incontinent   [ ] Oliguria/Anuria   [ ] Mcqueen  [ ] Other       Musculoskeletal:    [ ] Normal   [ ] Weakness  [ ] Edema  [x] Bedbound/Wheelchair bound [ ]  Ambulatory [ ] with [ ] without assistance [ ] Functional quadriplegia    Neurological: [ ] No focal deficits  [ ] Cognitive impairment  [ ] Dysphagia [ ] Dysarthria [ ] Paresis [ ] Other   [ ] Brain compression  [ ] Cerebral edema [x] Encephalopathy    Skin: [x] Normal   [ ] Ulcer(s) type [ ] Diabetic [ ] Pressure [ ] Other   Stage        POA [ ]  Yes [ ]  No       [ ] Rash    LABS:                Protein Calorie Malnutrition: [ ] Mild [ ] Moderate [ ] Severe    Oral Intake: [ ] Unable/mouth care only [ ] Minimal [ ] Moderate [ ] Full Capability  Diet: [ ] NPO [ ] Tube feeds [ ] TPN [ ] Other     RADIOLOGY & ADDITIONAL STUDIES:    REFERRALS:   [ ] Chaplaincy  [ ] Hospice Care  [ ] Child Life  [ ] Social Work  [ ] Case management [ ] Nutrition [ ] Holistic Therapy [ ] Wound Care [ ]Physical Therapy [ ] Other [ ]See goals of care note in [facility] Progress Note [X ] Hospice Progress Note [ ]     HPI:  89yo F PMH of hypothyroidism, COPD on home O2, CHF.  p/w CC cough/SOB. Pt. states she has chronic cough but for past couple of days. mucous feels stuck", reports she tried her albuterol inhaler this AM but did not help. Denies fever chills CP n/v/d came to the emergency room with respiratory distress. Tests positive for viral influenza.  CXR negative for pneumonia and CHF, despite BNP of > 5000.EKG with rapid atrial flutter. Given IV cardizem, IV hydration and BiPap with improvement. Started on tamiflu. Remains alert and awake and able to provide a full history independently (23 Feb 2018 19:35)    Overnight: No events overnight, some agitation that did not seem to respond to .5 mg IV haldol. Patient on standing morphine .5 mg IV q4h with PRN 1 mg q1h. She received PRN 1 mg x 2 over the past 24 hours. Agitated overnight, minimal relief with haldol.     ADVANCE DIRECTIVES:    DNRYes      Allergies    Avelox (Urticaria; Rash)  Ventolin (Angioedema)    Intolerances    MEDICATIONS  (STANDING):  ALBUTerol/ipratropium for Nebulization 3 milliLiter(s) Nebulizer every 6 hours  diltiazem    Tablet 30 milliGRAM(s) Oral every 6 hours  furosemide   Injectable 40 milliGRAM(s) IV Push every 12 hours  methylPREDNISolone sodium succinate Injectable 20 milliGRAM(s) IV Push every 8 hours  morphine  - Injectable 0.5 milliGRAM(s) IV Push every 4 hours  pantoprazole  Injectable 40 milliGRAM(s) IV Push daily    MEDICATIONS  (PRN):  bisacodyl Suppository 10 milliGRAM(s) Rectal daily PRN Constipation  haloperidol    Injectable 1 milliGRAM(s) IV Push every 2 hours PRN agitation  morphine  - Injectable 1 milliGRAM(s) IV Push every 1 hour PRN dyspnea  morphine  - Injectable 1 milliGRAM(s) IV Push every 1 hour PRN pain      PRESENT SYMPTOMS:  Source: [ ] Patient   [ ] Family   [ ] Team     Pain:                        [x] No [ ] Yes             [ ] Mild [ ] Moderate [] Severe    Dyspnea:                [ ] No [x] Yes             [ ] Mild [ ] Moderate [x] Severe    Anxiety:                  [ ] No [x] Yes             [x] Mild [ ] Moderate [ ] Severe    Fatigue:                  [ ] No [ ] Yes             [ ] Mild [ ] Moderate [ ] Severe    Nausea:                  [x] No [ ] Yes             [ ] Mild [ ] Moderate [ ] Severe    Loss of appetite:   [x] No [ ] Yes             [ ] Mild [ ] Moderate [ ] Severe    Constipation:        [ ] No [ ] Yes             [ ] Mild [ ] Moderate [ ] Severe    Other Symptoms:  [ ] All other review of systems negative   [x] Unable to obtain due to poor mentation     Karnofsky Performance Score/Palliative Performance Status Version 2:  30%    PHYSICAL EXAM:  Vital Signs Last 24 Hrs  T(C): 36.7 (09 Mar 2018 05:27), Max: 36.7 (09 Mar 2018 05:27)  T(F): 98 (09 Mar 2018 05:27), Max: 98 (09 Mar 2018 05:27)  HR: 122 (09 Mar 2018 05:27) (119 - 122)  BP: 159/79 (09 Mar 2018 05:27) (127/74 - 159/79)  BP(mean): --  RR: 18 (09 Mar 2018 05:27) (18 - 18)  SpO2: 95% (09 Mar 2018 05:27) (95% - 95%)        General:  [ ] Alert  [ ] Oriented x      [x] Lethargic  [ ] Agitated   [ ] Cachexia   [ ] Unarousable  [ ] Verbal  [ ] Non-Verbal    HEENT:  [ ] Normal   [x] Dry mouth   [ ] ET Tube    [ ] Trach  [ ] Oral lesions    Lungs:   [ ] Clear [x] Tachypnea  [ ] Audible excessive secretions   [ ] Rhonchi        [ ] Right [ ] Left [ ] Bilateral  [ ] Crackles        [ ] Right [ ] Left [ ] Bilateral  [ ] Wheezing     [ ] Right [ ] Left [ ] Bilateral  [ ] Respiratory failure [ ] Acute [ ] Chronic [ ] Hypoxemic [ ] Hypercarbic [ ] Other    Cardiovascular:   [ ] Regular [ ] Irregular [x] Tachycardia   [ ] Bradycardia  [ ] Murmur [ ] Other  [ ] Shock [ ] Septic [ ] Hypovolemic [ ] Neurogenic [ ] Cardiogenic   [ ] Vasopressors [ ] Inotrophs    Abdomen:   [x] Soft  [x] Distended   [ ] +BS  [ ] Non tender [ ] Tender  [ ]PEG   [ ]OGT/ NGT   Last BM:     [ ] Other    Genitourinary:  [ ] Normal [x] Incontinent   [ ] Oliguria/Anuria   [ ] Mcqueen  [ ] Other       Musculoskeletal:    [ ] Normal   [ ] Weakness  [ ] Edema  [x] Bedbound/Wheelchair bound [ ]  Ambulatory [ ] with [ ] without assistance [ ] Functional quadriplegia    Neurological: [ ] No focal deficits  [ ] Cognitive impairment  [ ] Dysphagia [ ] Dysarthria [ ] Paresis [ ] Other   [ ] Brain compression  [ ] Cerebral edema [x] Encephalopathy    Skin: [x] Normal   [ ] Ulcer(s) type [ ] Diabetic [ ] Pressure [ ] Other   Stage        POA [ ]  Yes [ ]  No       [ ] Rash    LABS: no labs for review    Protein Calorie Malnutrition: [ ] Mild [ ] Moderate [ ] Severe    Oral Intake: [ ] Unable/mouth care only [ ] Minimal [ ] Moderate [ ] Full Capability  Diet: [ ] NPO [ ] Tube feeds [ ] TPN [ ] Other     RADIOLOGY & ADDITIONAL STUDIES: no new imaging for review    REFERRALS:   [ ] Chaplaincy  [ ] Hospice Care  [ ] Child Life  [ ] Social Work  [ ] Case management [ ] Nutrition [ ] Holistic Therapy [ ] Wound Care [ ]Physical Therapy [ ] Other [ ]See goals of care note in [facility]

## 2018-03-10 NOTE — PROGRESS NOTE ADULT - PROBLEM SELECTOR PLAN 5
follow renal function and continue gentle hydration
KPS 10%. Turn and position, support all ADLs.
follow renal function and continue gentle hydration

## 2018-03-10 NOTE — PROGRESS NOTE ADULT - PROBLEM SELECTOR PROBLEM 6
RLL pneumonia
Palliative care encounter
RLL pneumonia

## 2018-03-10 NOTE — PROGRESS NOTE ADULT - PROBLEM SELECTOR PROBLEM 1
Acute respiratory insufficiency
Acute on chronic respiratory failure with hypoxia and hypercapnia
Acute respiratory insufficiency
Acute on chronic respiratory failure with hypoxia and hypercapnia
Acute respiratory insufficiency
Acute on chronic respiratory failure with hypoxia and hypercapnia

## 2018-03-10 NOTE — PROGRESS NOTE ADULT - NSHPATTENDINGPLANDISCUSS_GEN_ALL_CORE
Medicine
Medicine team
the medical N.P. and the patient.
Patient and staff
Patient and 
Patient and staff
the medical N.P.
palliative care
the medical N.P. and 
the medical N.P. and 
the medical N.P. and  and patient
the medical N.P. and  and patient
the medical N.P. and the patient.
the medical N.P. and the patient.
the medical N.P. and 
the medical N.P. and  and patient
the medical N.P. and the patient.

## 2018-03-10 NOTE — PROGRESS NOTE ADULT - SUBJECTIVE AND OBJECTIVE BOX
Geriatric and Palliative Care Unit Progress Note    O/N events: No acute events/ On rounds pt appears agitated, denies pain however reporting SOB, noted to be tachypneic. Relief with Morphine and Haldol.     HPI:  89yo F PMH of hypothyroidism, COPD on home O2, CHF.  p/w CC cough/SOB. Pt. states she has chronic cough but for past couple of days. mucous feels stuck", reports she tried her albuterol inhaler this AM but did not help. Denies fever chills CP n/v/d came to the emergency room with respiratory distress. Tests positive for viral influenza.  CXR negative for pneumonia and CHF, despite BNP of > 5000.EKG with rapid atrial flutter. Given IV cardizem, IV hydration and BiPap with improvement. Started on tamiflu. Remains alert and awake and able to provide a full history independently (23 Feb 2018 19:35)    Indication for Palliative Care Unit Services: Dyspnea, agitation o    ADVANCE DIRECTIVES:    DNR [ x] YES [ ] NO                            Allergies    Avelox (Urticaria; Rash)  Ventolin (Angioedema)    Intolerances        MEDICATIONS  (STANDING):  methylPREDNISolone sodium succinate Injectable 20 milliGRAM(s) IV Push every 8 hours  morphine  - Injectable 0.5 milliGRAM(s) IV Push every 4 hours  pantoprazole  Injectable 40 milliGRAM(s) IV Push daily    MEDICATIONS  (PRN):  bisacodyl Suppository 10 milliGRAM(s) Rectal daily PRN Constipation  haloperidol    Injectable 1 milliGRAM(s) IV Push every 2 hours PRN agitation  metoprolol    tartrate Injectable 5 milliGRAM(s) IV Push every 6 hours PRN HR > 130  morphine  - Injectable 1 milliGRAM(s) IV Push every 1 hour PRN pain  morphine  - Injectable 1 milliGRAM(s) IV Push every 1 hour PRN dyspnea      PRESENT SYMPTOMS:  Source: [x ] Patient   [ ] Family   [x ] Team     Pain:                        [x ] No [ ] Yes             [ ] Mild [ ] Moderate [ ] Severe    Onset -  Location -  Duration -  Character -  Alleviating/Aggravating -  Radiation -  Timing -      Dyspnea:                [ ] No [x ] Yes             [ ] Mild [ ] Moderate [ ] Severe    Anxiety:                  [ ] No [x ] Yes             [ ] Mild [ ] Moderate [ ] Severe    Fatigue:                  [ ] No [ x] Yes             [ ] Mild [ ] Moderate [ ] Severe    Nausea:                  [x ] No [ ] Yes             [ ] Mild [ ] Moderate [ ] Severe    Loss of appetite:   [ ] No [ x] Yes             [ ] Mild [ ] Moderate [ ] Severe      Other Symptoms:  [ ] All other review of systems negative   [x ] Unable to obtain due to poor mentation     Karnofsky Performance Score/Palliative Performance Status Version 2:     10    %    PHYSICAL EXAM:  Vital Signs Last 24 Hrs  T(C): 37.2 (10 Mar 2018 08:12), Max: 37.2 (10 Mar 2018 08:12)  T(F): 99 (10 Mar 2018 08:12), Max: 99 (10 Mar 2018 08:12)  HR: 123 (10 Mar 2018 08:12) (123 - 125)  BP: 150/80 (10 Mar 2018 08:12) (146/69 - 150/80)  BP(mean): --  RR: 24 (10 Mar 2018 08:12) (18 - 24)  SpO2: 93% (10 Mar 2018 08:12) (93% - 94%) I&O's Summary    09 Mar 2018 07:01  -  10 Mar 2018 07:00  --------------------------------------------------------  IN: 0 mL / OUT: 800 mL / NET: -800 mL    10 Mar 2018 06:01  -  10 Mar 2018 15:50  --------------------------------------------------------  IN: 0 mL / OUT: 700 mL / NET: -700 mL        General:  [ ] Alert  [x ] Disoriented      [ ] Lethargic  [x ] Agitated   [ ] Cachexia   [ ] Unarousable  [ ] Verbal  [x ] Non-Verbal    HEENT:  [ ] Normal   [x] Dry mouth   [ ] ET Tube    [ ] Trach  [ ] Oral lesions    Lungs:   [ ] Clear [ x] Tachypnea, dec air entry BL   [ ] Audible excessive secretions   [ ] Rhonchi        [ ] Right [ ] Left [ ] Bilateral  [ ] Crackles        [ ] Right [ ] Left [ ] Bilateral  [x ] Wheezing     [ ] Right [ ] Left [ x] Bilateral  [x ] Respiratory failure [x ] Acute [x ] Chronic [ x] Hypoxemic [x ] Hypercarbic [ ] Other    Cardiovascular:   [ ] Regular [ ] Irregular [x ] Tachycardia   [ ] Bradycardia  [ ] Murmur [ ] Other  [ ] Shock [ ] Septic [ ] Hypovolemic [ ] Neurogenic [ ] Cardiogenic   [ ] Vasopressors [ ] Inotrophs    Abdomen:   [x ] Soft  [ ] Distended   [ ] +BS  [x ] Non tender [ ] Tender  [ ]PEG   [ ]OGT/ NGT   Last BM:   3/6  [ ] Other    Genitourinary:  [ ] Normal [ ] Incontinent   [ ] Oliguria/Anuria   [x ] Mcqueen  [ ] Other       Musculoskeletal:    [ ] Normal   [ x] Weakness  [ ] Edema  [x ] Bedbound/Wheelchair bound [ ]  Ambulatory [ ] with [ ] without assistance [ ] Functional quadriplegia    Neurological: [ ] No focal deficits  [ ] Cognitive impairment  [ ] Dysphagia [ ] Dysarthria [ ] Paresis [ ] Other   [ ] Brain compression  [ ] Cerebral edema [ x] Encephalopathy    Skin: [] Normal   [ x] Ulcer(s) type [ ] Diabetic [x ] Pressure [ ] Other   Stage        POA [x ]  Yes [ ]  No       [ ] Rash    LABS: none new     Protein Calorie Malnutrition: [ ] Mild [x ] Moderate [ ] Severe    Oral Intake: [x ] Unable/mouth care only [ ] Minimal [ ] Moderate [ ] Full Capability  Diet: [ ] NPO [ ] Tube feeds [ ] TPN [x ] Other     RADIOLOGY & ADDITIONAL STUDIES:   none new       REFERRALS:   [ ] Chaplaincy  [ ] Hospice Care  [ ] Child Life  [ ] Social Work  [ ] Case management [ ] Nutrition [ ] Holistic Therapy [ ] Wound Care [ ]Physical Therapy [ ] Other [ ]See goals of care note in [place]

## 2018-03-10 NOTE — PROGRESS NOTE ADULT - PROBLEM SELECTOR PROBLEM 4
Goals of care, counseling/discussion
Moderate protein-calorie malnutrition
COPD, severe
Palliative care encounter
COPD, severe

## 2018-03-10 NOTE — PROGRESS NOTE ADULT - PROBLEM SELECTOR PLAN 6
Completed 7 days of antibiotics
DNR/DNI. Comfort care. Aggressively manage sx. Will re approach Dispo planning monday. Pt spouse with dementia, currently hospitalized, pt has 24/7 HHA for assistance.
Completed 7 days of antibiotics
started on azithromycin and Cefepime
started on azithromycin and Cefepime
started on azithromycin and Cefepime  To complete 7 days of antibiotics
started on azithromycin and Cefepime  To complete 7 days of antibiotics
Completed 7 days of antibiotics
started on azithromycin and Cefepime  To complete 7 days of antibiotics
started on azithromycin and Cefepime  To complete 7 days of antibiotics

## 2018-03-10 NOTE — PROGRESS NOTE ADULT - SUBJECTIVE AND OBJECTIVE BOX
Patient is a 89y old  Female who presents with a chief complaint of COUGH,SOB (24 Feb 2018 03:47)      HPI:  Patient comfortable , Actively dying.  Minimally interactive    MEDICATIONS  (STANDING):  methylPREDNISolone sodium succinate Injectable 20 milliGRAM(s) IV Push every 8 hours  morphine  - Injectable 0.5 milliGRAM(s) IV Push every 4 hours  pantoprazole  Injectable 40 milliGRAM(s) IV Push daily    MEDICATIONS  (PRN):  bisacodyl Suppository 10 milliGRAM(s) Rectal daily PRN Constipation  haloperidol    Injectable 1 milliGRAM(s) IV Push every 2 hours PRN agitation  metoprolol    tartrate Injectable 5 milliGRAM(s) IV Push every 6 hours PRN HR > 130  morphine  - Injectable 1 milliGRAM(s) IV Push every 1 hour PRN pain  morphine  - Injectable 1 milliGRAM(s) IV Push every 1 hour PRN dyspnea      Allergies    Avelox (Urticaria; Rash)  Ventolin (Angioedema)    Intolerances        Vital Signs Last 24 Hrs  T(C): 37.2 (10 Mar 2018 08:12), Max: 37.2 (10 Mar 2018 08:12)  T(F): 99 (10 Mar 2018 08:12), Max: 99 (10 Mar 2018 08:12)  HR: 123 (10 Mar 2018 08:12) (123 - 125)  BP: 150/80 (10 Mar 2018 08:12) (146/69 - 150/80)  BP(mean): --  RR: 24 (10 Mar 2018 08:12) (18 - 24)  SpO2: 93% (10 Mar 2018 08:12) (93% - 94%)      PHYSICAL EXAM:  GENERAL: cachectic comfortable   HEAD:  Atraumatic  EYES: EOM, PERRLA, conjunctiva pink and sclera white  ENT: No tonsillar erythema, exudates, or enlargement, moist mucous membranes, good dentition, no lesions  NECK: Supple, No JVD, normal thyroid, carotids with normal upstrokes and no bruits  CHEST/LUNG: Clear to auscultation bilaterally, Decreased BS with scattered rhonchi  HEART: Regular rate and rhythm, No murmurs, rubs, or gallops  ABDOMEN: Soft, nondistended, no masses, guarding, tenderness or rebound, bowel sounds present  EXTREMITIES:  2+ Peripheral Pulses, No clubbing, cyanosis, or edema. No arthritis of shoulders, elbows, hands, hips, knees, ankles, or feet. No DJD C spine, T spine, or L/S spine  LYMPH: No lymphadenopathy noted  SKIN: No rashes or lesions  NERVOUS SYSTEM:  Alert & Oriented X3, normal cognitive function. Motor Strength 5/5 right upper and right lower.  5/5 left upper and left lower extremities, DTRs 2+ intact and symmetric    LABS:  No labs obtained today       CAPILLARY BLOOD GLUCOSE          RADIOLOGY & ADDITIONAL TESTS:

## 2018-03-10 NOTE — PROGRESS NOTE ADULT - PROBLEM SELECTOR PROBLEM 3
Encephalopathy acute
Atrial flutter with rapid ventricular response
Encephalopathy acute
Atrial flutter with rapid ventricular response
Encephalopathy acute
Atrial flutter with rapid ventricular response

## 2018-03-10 NOTE — PROGRESS NOTE ADULT - PROBLEM SELECTOR PROBLEM 2
COPD, severe
Influenza A
COPD, severe
Influenza A
COPD, severe
Influenza A

## 2018-03-10 NOTE — PROGRESS NOTE ADULT - PROBLEM SELECTOR PLAN 4
wt 46 kg, BMI 19.8, albumin 3.5, pressure ulcer. Supportive care. Pt currently lethargic w no interest in taking PO.

## 2018-03-10 NOTE — PROGRESS NOTE ADULT - PROVIDER SPECIALTY LIST ADULT
Cardiology
Internal Medicine
Palliative Care
Pulmonology
Internal Medicine
Pulmonology
Palliative Care
Internal Medicine

## 2018-03-10 NOTE — PROGRESS NOTE ADULT - PROBLEM SELECTOR PLAN 1
s/p tamiflu this admission, now likely worsening baseline COPD  remains off bipap as pt requested it be removed  on 02 via NC as tolerated for comfort   C/w morphine 0.5 mg IV Q4h ATC with 1 mg IV q1h PRN, received 2 PRN in past 24 hours.

## 2018-03-10 NOTE — PROGRESS NOTE ADULT - PROBLEM SELECTOR PLAN 3
Delirium, agitation at night  - Will increase haldol from .5 mg IV q2h to 1 mg IV q2h PRN
occasional Delirium, agitation  C/w haldol from 1 mg IV q2h PRN, used x1 in past 24 hours
oral cardizem and telemetry  continue to monitor tae and adjust meds as needed  No AC at this time
oral cardizem dose increased and telemetry  continue to monitor rate and adjust meds as needed  No AC at this time
slowly improving as per hha and nurse at bedside  ? delirium
worsening delirium
worsening delirium
oral cardizem dose increased and telemetry  continue to monitor rate and adjust meds as needed  No AC at this time

## 2018-03-10 NOTE — PROGRESS NOTE ADULT - PROBLEM SELECTOR PROBLEM 5
Debility
Dehydration

## 2018-03-11 NOTE — PROVIDER CONTACT NOTE (OTHER) - SITUATION
"chest squeezing;" -766d Aflutter on cardiac monitor
HR sustaining 120's-130's on cardiac monitor
Patient converted from AFIB to NS rhythm as per tele tech.
Patient states she can't breathe on BIPAP.
Patient without spontaneous respirations or pulse
Patient's Arterial Blood Gas Lab Resulted.
Patient's Respiratory Rate 30's - 50's.
Tachycardia on telemetry
Sinus Vinicio down to 48 and immediately back up in the 50's.

## 2018-03-11 NOTE — PROVIDER CONTACT NOTE (OTHER) - RECOMMENDATIONS
Patient pronounced dead at 0600  Family notified Patient pronounced dead at 0600  Multiple attempts to reach spouse Krish Bettencourt, will continue to call.

## 2018-03-11 NOTE — PROVIDER CONTACT NOTE (OTHER) - REASON
Ectopy on telemetry
Expiration
HR sustaining 120's-130's on cardiac monitor
Lab Results.
Patient converted from AFIB to NS rhythm as per tele tech.
Patient states she can't breathe on BIPAP.
Pt c/o "chest squeezing;" HR sustaining 140-150s Aflutter on cardiac monitor
Respiratory Rate.
Sinus Vinicio down to 48 and immediately back up in the 50's.

## 2018-03-11 NOTE — DISCHARGE NOTE FOR THE EXPIRED PATIENT - HOSPITAL COURSE
89yo F PMH of hypothyroidism, COPD on home O2 p/w CC cough/SOB. On presentation to emergency room with respiratory distress. Tests positive for viral influenza. On admission CXR not suggestive of pneumonia or CHF, despite BNP of > 5000. EKG with rapid atrial flutter. Given IV cardizem, IV hydration and BiPap with improvement. Started on tamiflu. At that time was awake and alert. During her hospital stay she developed RLL PNA, for which she was tx w abx. Afib RVR persisted despite Cardizem. Pt resp status continued to decline. Pt was made DNR/DNI in consultation with ethics w support of both her  and heidi. Pt ultimately transferred to PCU for comfort based care. Pt  on 3/11 at 0600. Multiple phone calls were placed to pt's  who did not answer. Pt's HHA of 8 years Katharina came to hospital this AM during rounds and was told of Mrs Bettencourt's passing. She indicated that Mr Bettencourt often unplugs the home phone as he is hard of hearing and would not answer it anyway. She has stated that she will tell Mr Bettencourt and Mrs Bettencourt's step son. Mrs. Bettencourt's personal items were given to Katharina.

## 2018-03-11 NOTE — PROVIDER CONTACT NOTE (OTHER) - BACKGROUND
Patient admitted with cough and SOB . Influenza A
Patient admitted for flu, SOB, hypoxia.
Patient admitted for respiratory failure
Patient admitted with Acute on Chronic Respiratory Failure with Hypoxia, Pneumonia, Chronic Obstructive Pulmonary Disease, Atrial Flutter, Influenza.
Patient admitted with Acute on Chronic Respiratory Failure with Hypoxia, Pneumonia, Chronic Obstructive Pulmonary Disease, Atrial Flutter, Influenza.
Patient admitted with COPD,Flu A
Patient has DNR order
Patient with history of COPD and rapid A-fib
Pt admitted for SOB, respiratory distress; +flu and pna

## 2018-03-11 NOTE — PROVIDER CONTACT NOTE (OTHER) - NAME OF MD/NP/PA/DO NOTIFIED:
Dr. Helms office ext [phone]
GOOD Turner
Lupe Mercado
NP Bhavesh
NP Bhavesh Bryan
NP Madhavi Beck
NP Ramón Beck
uLpe Mercado
LESLEY Eason

## 2021-10-29 NOTE — PATIENT PROFILE ADULT. - FALL HARM RISK
[FreeTextEntry1] : Patient with history of DM, CABG, CAD, AF/AFL, TIA CHADVSC 6. Patient came in for followup after hospital admission. Pt s/p DCCV for AF. Patient fell very short of breath especially when walking. After cardioversion shortness of breath improved however patient continued to have be short of breath and tired. \par \par Patient s/p wachman implant. Pt feels better - less SOB\par \par EKG SR 82 bpm age(85 years old or older)

## 2022-01-13 NOTE — PROGRESS NOTE ADULT - PROBLEM SELECTOR PLAN 2
finished tamiflu  PO as tolerated  encourage participation with physical therapy Topical Clindamycin Pregnancy And Lactation Text: This medication is Pregnancy Category B and is considered safe during pregnancy. It is unknown if it is excreted in breast milk.
